# Patient Record
Sex: FEMALE | Race: WHITE | Employment: UNEMPLOYED | URBAN - METROPOLITAN AREA
[De-identification: names, ages, dates, MRNs, and addresses within clinical notes are randomized per-mention and may not be internally consistent; named-entity substitution may affect disease eponyms.]

---

## 2017-03-21 PROBLEM — F32.5 MAJOR DEPRESSIVE DISORDER WITH SINGLE EPISODE, IN REMISSION (HCC): Status: ACTIVE | Noted: 2017-03-21

## 2017-08-08 ENCOUNTER — HOSPITAL ENCOUNTER (OUTPATIENT)
Dept: OTHER | Age: 42
Discharge: HOME | End: 2017-08-08
Attending: INTERNAL MEDICINE | Admitting: INTERNAL MEDICINE

## 2017-08-08 DIAGNOSIS — I73.9 PVD (PERIPHERAL VASCULAR DISEASE) (HCC): ICD-10-CM

## 2017-08-08 DIAGNOSIS — F33.42 RECURRENT MAJOR DEPRESSIVE DISORDER, IN FULL REMISSION (HCC): ICD-10-CM

## 2017-08-08 LAB
A/G RATIO: 1.5 (ref 1.1–2.2)
ALBUMIN SERPL-MCNC: 4.3 G/DL (ref 3.4–5)
ALP BLD-CCNC: 72 U/L (ref 40–129)
ALT SERPL-CCNC: 10 U/L (ref 10–40)
ANION GAP SERPL CALCULATED.3IONS-SCNC: 15 MMOL/L (ref 3–16)
AST SERPL-CCNC: 13 U/L (ref 15–37)
BILIRUB SERPL-MCNC: 0.4 MG/DL (ref 0–1)
BUN BLDV-MCNC: 14 MG/DL (ref 7–20)
CALCIUM SERPL-MCNC: 9.9 MG/DL (ref 8.3–10.6)
CHLORIDE BLD-SCNC: 97 MMOL/L (ref 99–110)
CHOLESTEROL, TOTAL: 226 MG/DL (ref 0–199)
CO2: 25 MMOL/L (ref 21–32)
CREAT SERPL-MCNC: 0.9 MG/DL (ref 0.6–1.1)
GFR AFRICAN AMERICAN: >60
GFR NON-AFRICAN AMERICAN: >60
GLOBULIN: 2.9 G/DL
GLUCOSE BLD-MCNC: 82 MG/DL (ref 70–99)
HDLC SERPL-MCNC: 44 MG/DL (ref 40–60)
LDL CHOLESTEROL CALCULATED: 123 MG/DL
POTASSIUM SERPL-SCNC: 4.1 MMOL/L (ref 3.5–5.1)
SODIUM BLD-SCNC: 137 MMOL/L (ref 136–145)
TOTAL PROTEIN: 7.2 G/DL (ref 6.4–8.2)
TRIGL SERPL-MCNC: 296 MG/DL (ref 0–150)
VLDLC SERPL CALC-MCNC: 59 MG/DL

## 2018-07-19 ENCOUNTER — HOSPITAL ENCOUNTER (OUTPATIENT)
Dept: OTHER | Age: 43
Discharge: HOME | End: 2018-07-19
Attending: INTERNAL MEDICINE | Admitting: INTERNAL MEDICINE

## 2018-07-19 LAB
A/G RATIO: 1.7 (ref 1.1–2.2)
ALBUMIN SERPL-MCNC: 4.7 G/DL (ref 3.4–5)
ALP BLD-CCNC: 42 U/L (ref 40–129)
ALT SERPL-CCNC: 9 U/L (ref 10–40)
ANION GAP SERPL CALCULATED.3IONS-SCNC: 9 MMOL/L (ref 3–16)
AST SERPL-CCNC: 14 U/L (ref 15–37)
BILIRUB SERPL-MCNC: 0.4 MG/DL (ref 0–1)
BUN BLDV-MCNC: 12 MG/DL (ref 7–20)
CALCIUM SERPL-MCNC: 10.2 MG/DL (ref 8.3–10.6)
CHLORIDE BLD-SCNC: 104 MMOL/L (ref 99–110)
CHOLESTEROL, TOTAL: 154 MG/DL (ref 0–199)
CO2: 27 MMOL/L (ref 21–32)
CREAT SERPL-MCNC: 1 MG/DL (ref 0.6–1.1)
GFR AFRICAN AMERICAN: >60
GFR NON-AFRICAN AMERICAN: >60
GLOBULIN: 2.7 G/DL
GLUCOSE BLD-MCNC: 78 MG/DL (ref 70–99)
HCT VFR BLD CALC: 39.2 % (ref 36–48)
HDLC SERPL-MCNC: 57 MG/DL (ref 40–60)
HEMOGLOBIN: 13.5 G/DL (ref 12–16)
LDL CHOLESTEROL CALCULATED: 77 MG/DL
MCH RBC QN AUTO: 31.3 PG (ref 26–34)
MCHC RBC AUTO-ENTMCNC: 34.6 G/DL (ref 31–36)
MCV RBC AUTO: 90.5 FL (ref 80–100)
PDW BLD-RTO: 13.2 % (ref 12.4–15.4)
PLATELET # BLD: 330 K/UL (ref 135–450)
PMV BLD AUTO: 8.4 FL (ref 5–10.5)
POTASSIUM SERPL-SCNC: 4.5 MMOL/L (ref 3.5–5.1)
RBC # BLD: 4.33 M/UL (ref 4–5.2)
SODIUM BLD-SCNC: 140 MMOL/L (ref 136–145)
TOTAL PROTEIN: 7.4 G/DL (ref 6.4–8.2)
TRIGL SERPL-MCNC: 102 MG/DL (ref 0–150)
VLDLC SERPL CALC-MCNC: 20 MG/DL
WBC # BLD: 8.4 K/UL (ref 4–11)

## 2019-05-22 ENCOUNTER — HOSPITAL ENCOUNTER (OUTPATIENT)
Age: 44
Discharge: HOME | End: 2019-05-22
Payer: COMMERCIAL

## 2019-05-22 DIAGNOSIS — E78.00 PURE HYPERCHOLESTEROLEMIA: ICD-10-CM

## 2019-05-22 DIAGNOSIS — I10 ESSENTIAL HYPERTENSION: ICD-10-CM

## 2019-05-22 LAB
A/G RATIO: 1.5 (ref 1.1–2.2)
ALBUMIN SERPL-MCNC: 4.8 G/DL (ref 3.4–5)
ALP BLD-CCNC: 44 U/L (ref 40–129)
ALT SERPL-CCNC: 10 U/L (ref 10–40)
ANION GAP SERPL CALCULATED.3IONS-SCNC: 14 MMOL/L (ref 3–16)
AST SERPL-CCNC: 16 U/L (ref 15–37)
BILIRUB SERPL-MCNC: 0.5 MG/DL (ref 0–1)
BUN BLDV-MCNC: 16 MG/DL (ref 7–20)
CALCIUM SERPL-MCNC: 10.8 MG/DL (ref 8.3–10.6)
CHLORIDE BLD-SCNC: 104 MMOL/L (ref 99–110)
CHOLESTEROL, TOTAL: 177 MG/DL (ref 0–199)
CO2: 25 MMOL/L (ref 21–32)
CREAT SERPL-MCNC: 1.3 MG/DL (ref 0.6–1.1)
GFR AFRICAN AMERICAN: 54
GFR NON-AFRICAN AMERICAN: 44
GLOBULIN: 3.1 G/DL
GLUCOSE BLD-MCNC: 88 MG/DL (ref 70–99)
HCT VFR BLD CALC: 38.4 % (ref 36–48)
HDLC SERPL-MCNC: 49 MG/DL (ref 40–60)
HEMOGLOBIN: 13.4 G/DL (ref 12–16)
LDL CHOLESTEROL CALCULATED: 91 MG/DL
MCH RBC QN AUTO: 31.4 PG (ref 26–34)
MCHC RBC AUTO-ENTMCNC: 34.8 G/DL (ref 31–36)
MCV RBC AUTO: 90.4 FL (ref 80–100)
PDW BLD-RTO: 13.4 % (ref 12.4–15.4)
PLATELET # BLD: 313 K/UL (ref 135–450)
PMV BLD AUTO: 8.6 FL (ref 5–10.5)
POTASSIUM SERPL-SCNC: 3.7 MMOL/L (ref 3.5–5.1)
RBC # BLD: 4.25 M/UL (ref 4–5.2)
SODIUM BLD-SCNC: 143 MMOL/L (ref 136–145)
TOTAL PROTEIN: 7.9 G/DL (ref 6.4–8.2)
TRIGL SERPL-MCNC: 183 MG/DL (ref 0–150)
VLDLC SERPL CALC-MCNC: 37 MG/DL
WBC # BLD: 9.8 K/UL (ref 4–11)

## 2019-05-22 PROCEDURE — 80053 COMPREHEN METABOLIC PANEL: CPT

## 2019-05-22 PROCEDURE — 80061 LIPID PANEL: CPT

## 2019-05-22 PROCEDURE — 85027 COMPLETE CBC AUTOMATED: CPT

## 2019-05-22 PROCEDURE — 36415 COLL VENOUS BLD VENIPUNCTURE: CPT

## 2021-08-25 ENCOUNTER — HOSPITAL ENCOUNTER (OUTPATIENT)
Dept: GENERAL RADIOLOGY | Age: 46
Discharge: HOME | End: 2021-08-25
Payer: COMMERCIAL

## 2021-08-25 DIAGNOSIS — S99.922A INJURY OF TOE ON LEFT FOOT, INITIAL ENCOUNTER: ICD-10-CM

## 2021-08-25 PROCEDURE — 73660 X-RAY EXAM OF TOE(S): CPT

## 2021-08-29 LAB — URINE CULTURE, ROUTINE: NORMAL

## 2021-08-30 LAB
GRAM STAIN RESULT: ABNORMAL
ORGANISM: ABNORMAL
WOUND/ABSCESS: ABNORMAL

## 2021-10-14 ENCOUNTER — HOSPITAL ENCOUNTER (OUTPATIENT)
Dept: WOMENS IMAGING | Age: 46
Discharge: HOME | End: 2021-10-14
Payer: COMMERCIAL

## 2021-10-14 VITALS — WEIGHT: 165 LBS | HEIGHT: 69 IN | BODY MASS INDEX: 24.44 KG/M2

## 2021-10-14 DIAGNOSIS — Z12.31 VISIT FOR SCREENING MAMMOGRAM: ICD-10-CM

## 2021-10-14 PROCEDURE — 77067 SCR MAMMO BI INCL CAD: CPT

## 2021-10-15 ENCOUNTER — OFFICE VISIT (OUTPATIENT)
Dept: FAMILY MEDICINE CLINIC | Age: 46
End: 2021-10-15
Payer: COMMERCIAL

## 2021-10-15 VITALS
DIASTOLIC BLOOD PRESSURE: 76 MMHG | OXYGEN SATURATION: 98 % | HEIGHT: 69 IN | WEIGHT: 165.6 LBS | BODY MASS INDEX: 24.53 KG/M2 | SYSTOLIC BLOOD PRESSURE: 124 MMHG | HEART RATE: 75 BPM | RESPIRATION RATE: 18 BRPM

## 2021-10-15 DIAGNOSIS — N32.81 OAB (OVERACTIVE BLADDER): ICD-10-CM

## 2021-10-15 DIAGNOSIS — R92.8 ABNORMAL MAMMOGRAM OF BOTH BREASTS: ICD-10-CM

## 2021-10-15 DIAGNOSIS — E87.6 HYPOKALEMIA: ICD-10-CM

## 2021-10-15 DIAGNOSIS — K21.9 GASTROESOPHAGEAL REFLUX DISEASE WITHOUT ESOPHAGITIS: ICD-10-CM

## 2021-10-15 DIAGNOSIS — R92.8 ABNORMAL MAMMOGRAM: Primary | ICD-10-CM

## 2021-10-15 DIAGNOSIS — I10 ESSENTIAL HYPERTENSION: ICD-10-CM

## 2021-10-15 DIAGNOSIS — Z76.89 ENCOUNTER TO ESTABLISH CARE: Primary | ICD-10-CM

## 2021-10-15 DIAGNOSIS — E78.00 PURE HYPERCHOLESTEROLEMIA: ICD-10-CM

## 2021-10-15 PROCEDURE — 99204 OFFICE O/P NEW MOD 45 MIN: CPT | Performed by: NURSE PRACTITIONER

## 2021-10-15 PROCEDURE — 36415 COLL VENOUS BLD VENIPUNCTURE: CPT | Performed by: NURSE PRACTITIONER

## 2021-10-15 PROCEDURE — G8427 DOCREV CUR MEDS BY ELIG CLIN: HCPCS | Performed by: NURSE PRACTITIONER

## 2021-10-15 PROCEDURE — 1036F TOBACCO NON-USER: CPT | Performed by: NURSE PRACTITIONER

## 2021-10-15 PROCEDURE — G8484 FLU IMMUNIZE NO ADMIN: HCPCS | Performed by: NURSE PRACTITIONER

## 2021-10-15 PROCEDURE — G8420 CALC BMI NORM PARAMETERS: HCPCS | Performed by: NURSE PRACTITIONER

## 2021-10-15 RX ORDER — CLONIDINE HYDROCHLORIDE 0.1 MG/1
TABLET ORAL
COMMUNITY
Start: 2021-10-11 | End: 2022-03-09

## 2021-10-15 RX ORDER — AMLODIPINE BESYLATE 5 MG/1
5 TABLET ORAL DAILY
COMMUNITY
Start: 2021-07-01 | End: 2021-10-15 | Stop reason: SDUPTHER

## 2021-10-15 RX ORDER — PROMETHAZINE HYDROCHLORIDE 12.5 MG/1
TABLET ORAL
COMMUNITY
Start: 2021-09-01 | End: 2021-10-15 | Stop reason: SDUPTHER

## 2021-10-15 RX ORDER — FLUOXETINE HYDROCHLORIDE 20 MG/1
CAPSULE ORAL
COMMUNITY
Start: 2021-07-22 | End: 2021-10-15

## 2021-10-15 RX ORDER — POTASSIUM CHLORIDE 750 MG/1
10 TABLET, EXTENDED RELEASE ORAL DAILY
COMMUNITY
Start: 2021-08-03 | End: 2021-10-15 | Stop reason: SDUPTHER

## 2021-10-15 RX ORDER — LOSARTAN POTASSIUM 100 MG/1
100 TABLET ORAL DAILY
Qty: 90 TABLET | Refills: 1 | Status: SHIPPED | OUTPATIENT
Start: 2021-10-15 | End: 2022-03-09 | Stop reason: SDUPTHER

## 2021-10-15 RX ORDER — AMLODIPINE BESYLATE 5 MG/1
5 TABLET ORAL DAILY
Qty: 90 TABLET | Refills: 1 | Status: SHIPPED | OUTPATIENT
Start: 2021-10-15 | End: 2022-03-09 | Stop reason: SDUPTHER

## 2021-10-15 RX ORDER — ESOMEPRAZOLE MAGNESIUM 40 MG/1
40 CAPSULE, DELAYED RELEASE ORAL
Qty: 90 CAPSULE | Refills: 1 | Status: SHIPPED | OUTPATIENT
Start: 2021-10-15 | End: 2022-03-09 | Stop reason: SDUPTHER

## 2021-10-15 RX ORDER — ESOMEPRAZOLE MAGNESIUM 40 MG/1
40 CAPSULE, DELAYED RELEASE ORAL
COMMUNITY
Start: 2021-08-25 | End: 2021-10-15 | Stop reason: SDUPTHER

## 2021-10-15 RX ORDER — POTASSIUM CHLORIDE 750 MG/1
10 TABLET, EXTENDED RELEASE ORAL DAILY
Qty: 90 TABLET | Refills: 1 | Status: SHIPPED | OUTPATIENT
Start: 2021-10-15 | End: 2022-08-17 | Stop reason: SDUPTHER

## 2021-10-15 RX ORDER — LOSARTAN POTASSIUM 100 MG/1
TABLET ORAL
COMMUNITY
End: 2021-10-15 | Stop reason: SDUPTHER

## 2021-10-15 RX ORDER — RISPERIDONE 0.5 MG/1
TABLET, FILM COATED ORAL
COMMUNITY
Start: 2021-10-11 | End: 2022-04-25 | Stop reason: SDUPTHER

## 2021-10-15 RX ORDER — OXYBUTYNIN CHLORIDE 10 MG/1
10 TABLET, EXTENDED RELEASE ORAL DAILY
Qty: 30 TABLET | Refills: 3 | Status: SHIPPED | OUTPATIENT
Start: 2021-10-15 | End: 2021-10-15

## 2021-10-15 RX ORDER — PROMETHAZINE HYDROCHLORIDE 12.5 MG/1
TABLET ORAL
Qty: 30 TABLET | Refills: 2 | Status: SHIPPED | OUTPATIENT
Start: 2021-10-15 | End: 2022-03-09 | Stop reason: ALTCHOICE

## 2021-10-15 RX ORDER — OXYBUTYNIN CHLORIDE 10 MG/1
10 TABLET, EXTENDED RELEASE ORAL DAILY
Qty: 90 TABLET | Refills: 1 | Status: SHIPPED | OUTPATIENT
Start: 2021-10-15 | End: 2022-03-09 | Stop reason: SDUPTHER

## 2021-10-15 RX ORDER — ACYCLOVIR 200 MG/1
CAPSULE ORAL
COMMUNITY
End: 2021-11-09 | Stop reason: SDUPTHER

## 2021-10-15 SDOH — ECONOMIC STABILITY: TRANSPORTATION INSECURITY
IN THE PAST 12 MONTHS, HAS LACK OF TRANSPORTATION KEPT YOU FROM MEETINGS, WORK, OR FROM GETTING THINGS NEEDED FOR DAILY LIVING?: NO

## 2021-10-15 SDOH — ECONOMIC STABILITY: TRANSPORTATION INSECURITY
IN THE PAST 12 MONTHS, HAS THE LACK OF TRANSPORTATION KEPT YOU FROM MEDICAL APPOINTMENTS OR FROM GETTING MEDICATIONS?: NO

## 2021-10-15 SDOH — ECONOMIC STABILITY: FOOD INSECURITY: WITHIN THE PAST 12 MONTHS, THE FOOD YOU BOUGHT JUST DIDN'T LAST AND YOU DIDN'T HAVE MONEY TO GET MORE.: NEVER TRUE

## 2021-10-15 SDOH — ECONOMIC STABILITY: FOOD INSECURITY: WITHIN THE PAST 12 MONTHS, YOU WORRIED THAT YOUR FOOD WOULD RUN OUT BEFORE YOU GOT MONEY TO BUY MORE.: NEVER TRUE

## 2021-10-15 ASSESSMENT — ENCOUNTER SYMPTOMS
SINUS PAIN: 0
VOMITING: 0
EYE DISCHARGE: 0
DIARRHEA: 0
EYE REDNESS: 0
EYE PAIN: 0
SORE THROAT: 0
WHEEZING: 0
ABDOMINAL PAIN: 1
COUGH: 0
ABDOMINAL DISTENTION: 0
SINUS PRESSURE: 0
NAUSEA: 0
SHORTNESS OF BREATH: 0

## 2021-10-15 ASSESSMENT — SOCIAL DETERMINANTS OF HEALTH (SDOH): HOW HARD IS IT FOR YOU TO PAY FOR THE VERY BASICS LIKE FOOD, HOUSING, MEDICAL CARE, AND HEATING?: NOT HARD AT ALL

## 2021-10-15 NOTE — PATIENT INSTRUCTIONS
Patient Education        oxybutynin (oral)  Pronunciation:  CHRISTOPHER i NAPOLEON ti robin  Brand:  Ditropan XL  What is the most important information I should know about oxybutynin? You should not use oxybutynin if you have untreated or uncontrolled narrow-angle glaucoma, a blockage in your digestive tract (stomach or intestines), or if you are unable to urinate. What is oxybutynin? Oxybutynin reduces muscle spasms of the bladder and urinary tract. Oxybutynin is used to treat symptoms of overactive bladder, such as frequent or urgent urination, incontinence (urine leakage), and increased night-time urination. Oxybutynin may also be used for purposes not listed in this medication guide. What should I discuss with my healthcare provider before using oxybutynin? You should not use oxybutynin if you are allergic to it, or if you have:  · untreated or uncontrolled narrow-angle glaucoma;  · a blockage in your digestive tract (stomach or intestines); or  · if you are unable to urinate. Tell your doctor if you have ever had:  · glaucoma;  · liver disease;  · kidney disease;  · an enlarged prostate;  · ulcerative colitis;  · Parkinson's disease;  · a nerve disorder that affects your heart rate, blood pressure, or digestion;  · a muscle disorder such as myasthenia gravis; or  · a stomach disorder such as gastroesophageal reflux disease (GERD) or slow digestion. Tell your doctor if you are pregnant or breastfeeding. How should I take oxybutynin? Follow all directions on your prescription label and read all medication guides or instruction sheets. Use the medicine exactly as directed. Take oxybutynin with a full glass of water, at the same time each day. Oxybutynin may be taken with or without food. Swallow the extended-release tablet whole and do not crush, chew, or break it. Measure liquid medicine carefully. Use the dosing syringe provided, or use a medicine dose-measuring device (not a kitchen spoon).   Some tablets are worsen this effect. Ask your doctor before using opioid medication, a sleeping pill, a muscle relaxer, or medicine for anxiety or seizures. Tell your doctor about all your other medicines, especially:  · medicine to treat depression, anxiety, mood disorders, or mental illness;  · cold or allergy medicine (Benadryl and others);  · medicine to treat Parkinson's disease;  · medicine to treat stomach problems, motion sickness, or irritable bowel syndrome;  · medicine to treat overactive bladder; or  · bronchodilator asthma medication. This list is not complete. Other drugs may affect oxybutynin, including prescription and over-the-counter medicines, vitamins, and herbal products. Not all possible drug interactions are listed here. Where can I get more information? Your pharmacist can provide more information about oxybutynin. Remember, keep this and all other medicines out of the reach of children, never share your medicines with others, and use this medication only for the indication prescribed. Every effort has been made to ensure that the information provided by [address] is accurate, up-to-date, and complete, but no guarantee is made to that effect. Drug information contained herein may be time sensitive. [facility] information has been compiled for use by healthcare practitioners and consumers in the [facility] and therefore [facility] does not warrant that uses outside of the [facility] are appropriate, unless specifically indicated otherwise. [facility] drug information does not endorse drugs, diagnose patients or recommend therapy. [facility] drug information is an informational resource designed to assist licensed healthcare practitioners in caring for their patients and/or to serve consumers viewing this service as a supplement to, and not a substitute for, the expertise, skill, knowledge and judgment of healthcare practitioners.  The absence of a warning for a given drug or drug combination in no way should be construed to indicate that the drug or drug combination is safe, effective or appropriate for any given patient. [facility] does not assume any responsibility for any aspect of healthcare administered with the aid of information [facility] provides. The information contained herein is not intended to cover all possible uses, directions, precautions, warnings, drug interactions, allergic reactions, or adverse effects. If you have questions about the drugs you are taking, check with your doctor, nurse or pharmacist.  Copyright 0161-7015 [address]: 7.01. Revision date: 9/17/2019. Care instructions adapted under license by [facility] ([facility]). If you have questions about a medical condition or this instruction, always ask your healthcare professional. [place] [address] any warranty or liability for your use of this information.

## 2021-10-15 NOTE — PROGRESS NOTES
Grace Mock (:  1975) is a 54 y.o. female,Established patient, here for evaluation of the following chief complaint(s):  New Patient (NEW PATIENT, EST CARE, NEEDS REFILLS ON MEDICATION )      ASSESSMENT/PLAN:  1. Encounter to establish care  -The patient's medical, surgical, social, and family history were reviewed with the patient. Medications were reconciled. 2. Hypokalemia  -Reassess CMP today. Potassium chloride is being refilled. Will adjust or discontinue based upon results.  -Continue to follow with GI for evaluation. If this evaluation is unremarkable, consider referral to nephrology  -     Comprehensive Metabolic Panel; Future  -     potassium chloride (KLOR-CON M) 10 MEQ extended release tablet; Take 1 tablet by mouth daily, Disp-90 tablet, R-1Normal  3. OAB (overactive bladder)  -Presentation is consistent with overactive bladder. Discussed options with the patient. Oxybutynin is being sent to the pharmacy. The patient was educated on the medication use as well as side effects. Follow-up as needed if no improvement. -     oxybutynin (DITROPAN XL) 10 MG extended release tablet; Take 1 tablet by mouth daily, Disp-90 tablet, R-1Normal  4. Essential hypertension  -Controlled on losartan and amlodipine. Refill today. Check CMP today. -     losartan (COZAAR) 100 MG tablet; Take 1 tablet by mouth daily, Disp-90 tablet, R-1Normal  -     amLODIPine (NORVASC) 5 MG tablet; Take 1 tablet by mouth daily, Disp-90 tablet, R-1Normal  5. Gastroesophageal reflux disease without esophagitis  -Controlled on Nexium and promethazine as needed. Patient is following up with GI at [facility]. Defer any adjustment to them. -     esomeprazole (NEXIUM) 40 MG delayed release capsule; Take 1 capsule by mouth every morning (before breakfast), Disp-90 capsule, R-1Normal  -     promethazine (PHENERGAN) 12.5 MG tablet; TAKE ONE TABLET BY MOUTH EVERY 6 HOURS AS NEEDED FOR NAUSEA, Disp-30 tablet, R-2Normal  6. Pure hypercholesterolemia  -Has been elevated in the past.  Not checked in the few years. Recommended fasting labs. Will check at next follow-up in 6 months. 7. Abnormal mammogram of both breasts  -Prior to the patient's appointment, mammogram results have been sent to me. Noted asymmetry to both breasts. Had recommended diagnostic mammogram with possible ultrasound. Orders had already been placed. I discussed these results with the patient. She will call the scheduling service to schedule these tests. Return in about 6 months (around 4/15/2022) for Follow-up hypertension/fasting labs. SUBJECTIVE/OBJECTIVE:  EAMON Billings presents today to establish care. Her main concern is regarding medication refills as well as her potassium. She has a history of severely low potassium that need to be treated in the hospital.  She has been taking a potassium supplement for this since. She states that she ran out of the potassium supplement approximately 3 weeks ago. She had an issue with her previous PCP which is leading her to change providers. She has had multiple test performed for this. She is currently seeing a specialist with GI at [facility] to further investigate a possible cause for the hypokalemia. She has an EGD and colonoscopy scheduled for next week. She states that she has a history of a total hysterectomy. They found the ovarian mass. Following hysterectomy, she had issues with fluid in her abdomen which caused sepsis. She also had an issue with some bowel gain in the wrong place. He is required surgical procedures, and she is concerned that this may be the cause of her low potassium. She has also noted that since all of this happened she goes to the bathroom very frequently overnight. We will go 7-8 times. She would like to have her potassium checked to ensure that is not a problem. She would also like all of her medications refilled.     Nestor Billings has a significant mental health history including anxiety and depression. She is currently seeing psych for this. They prescribe her psych medications. They have been trying multiple combinations for her. They had tried Prozac, and this made her \"crazy. \"She is currently doing Risperdal and clonidine. She very recently started this regimen, so effectiveness is not quite yet been assessed. She does state that since stopping the Prozac she feels a lot better. Otherwise, Jeralyn Simmonds is a history of high blood pressure, high cholesterol that has not required medication, tobacco use, and marijuana use. She has stopped smoking cigarettes and marijuana. Review of Systems   Constitutional: Negative for chills and fever. HENT: Negative for ear discharge, ear pain, hearing loss, sinus pressure, sinus pain and sore throat. Eyes: Negative for pain, discharge and redness. Respiratory: Negative for cough, shortness of breath and wheezing. Cardiovascular: Positive for leg swelling. Negative for chest pain and palpitations. Gastrointestinal: Positive for abdominal pain. Negative for abdominal distention, diarrhea, nausea and vomiting. Genitourinary: Negative for dysuria and hematuria. Musculoskeletal: Negative for myalgias. Skin: Negative for rash. Neurological: Negative for dizziness, weakness, light-headedness, numbness and headaches. Psychiatric/Behavioral: Negative for decreased concentration, dysphoric mood, self-injury, sleep disturbance and suicidal ideas. The patient is nervous/anxious. Physical Exam  Constitutional:       General: She is not in acute distress. Appearance: Normal appearance. She is normal weight. HENT:      Head: Normocephalic and atraumatic. Right Ear: Tympanic membrane, ear canal and external ear normal. There is no impacted cerumen. Left Ear: Tympanic membrane, ear canal and external ear normal. There is no impacted cerumen.       Mouth/Throat:      Mouth: Mucous membranes are moist. Eyes:      Extraocular Movements: Extraocular movements intact. Conjunctiva/sclera: Conjunctivae normal.      Pupils: Pupils are equal, round, and reactive to light. Neck:      Thyroid: No thyromegaly. Vascular: No carotid bruit. Cardiovascular:      Rate and Rhythm: Normal rate and regular rhythm. Pulses: Normal pulses. Heart sounds: Normal heart sounds. No murmur heard. No gallop. Pulmonary:      Effort: Pulmonary effort is normal.      Breath sounds: Normal breath sounds. No wheezing. Abdominal:      General: Bowel sounds are normal.      Palpations: Abdomen is soft. There is no mass. Tenderness: There is abdominal tenderness. There is no guarding or rebound. Comments: Right lower quadrant tenderness   Musculoskeletal:         General: Normal range of motion. Cervical back: Normal range of motion and neck supple. Lymphadenopathy:      Cervical: No cervical adenopathy. Skin:     General: Skin is warm and dry. Capillary Refill: Capillary refill takes less than 2 seconds. Neurological:      Mental Status: She is alert and oriented to person, place, and time. Psychiatric:         Mood and Affect: Mood normal.         Behavior: Behavior normal.         Thought Content: Thought content normal.         Judgment: Judgment normal.               This dictation was generated by voice recognition computer software. Although all attempts are made to edit the dictation for accuracy, there may be errors in the transcription that are not intended. An electronic signature was used to authenticate this note.     --CATRACHO Macias - CNP

## 2021-10-16 LAB
A/G RATIO: 2 (ref 1.1–2.2)
ALBUMIN SERPL-MCNC: 4.8 G/DL (ref 3.4–5)
ALP BLD-CCNC: 56 U/L (ref 40–129)
ALT SERPL-CCNC: 27 U/L (ref 10–40)
ANION GAP SERPL CALCULATED.3IONS-SCNC: 17 MMOL/L (ref 3–16)
AST SERPL-CCNC: 28 U/L (ref 15–37)
BILIRUB SERPL-MCNC: <0.2 MG/DL (ref 0–1)
BUN BLDV-MCNC: 16 MG/DL (ref 7–20)
CALCIUM SERPL-MCNC: 10.1 MG/DL (ref 8.3–10.6)
CHLORIDE BLD-SCNC: 103 MMOL/L (ref 99–110)
CO2: 21 MMOL/L (ref 21–32)
CREAT SERPL-MCNC: 1.1 MG/DL (ref 0.6–1.1)
GFR AFRICAN AMERICAN: >60
GFR NON-AFRICAN AMERICAN: 53
GLOBULIN: 2.4 G/DL
GLUCOSE BLD-MCNC: 65 MG/DL (ref 70–99)
POTASSIUM SERPL-SCNC: 4 MMOL/L (ref 3.5–5.1)
SODIUM BLD-SCNC: 141 MMOL/L (ref 136–145)
TOTAL PROTEIN: 7.2 G/DL (ref 6.4–8.2)

## 2021-10-25 ENCOUNTER — TELEPHONE (OUTPATIENT)
Dept: RHEUMATOLOGY | Age: 46
End: 2021-10-25

## 2021-11-09 ENCOUNTER — HOSPITAL ENCOUNTER (OUTPATIENT)
Dept: WOMENS IMAGING | Age: 46
Discharge: HOME | End: 2021-11-09
Payer: COMMERCIAL

## 2021-11-09 ENCOUNTER — TELEPHONE (OUTPATIENT)
Dept: FAMILY MEDICINE CLINIC | Age: 46
End: 2021-11-09

## 2021-11-09 ENCOUNTER — HOSPITAL ENCOUNTER (OUTPATIENT)
Dept: ULTRASOUND IMAGING | Age: 46
Discharge: HOME | End: 2021-11-09
Payer: COMMERCIAL

## 2021-11-09 DIAGNOSIS — R92.8 ABNORMAL MAMMOGRAM: ICD-10-CM

## 2021-11-09 DIAGNOSIS — B00.1 FEVER BLISTER: Primary | ICD-10-CM

## 2021-11-09 PROCEDURE — 76641 ULTRASOUND BREAST COMPLETE: CPT

## 2021-11-09 PROCEDURE — G0279 TOMOSYNTHESIS, MAMMO: HCPCS

## 2021-11-09 RX ORDER — ACYCLOVIR 200 MG/1
400 CAPSULE ORAL 3 TIMES DAILY
Qty: 30 CAPSULE | Refills: 1 | Status: SHIPPED | OUTPATIENT
Start: 2021-11-09 | End: 2021-11-14

## 2021-11-09 NOTE — TELEPHONE ENCOUNTER
Patient stated she woke up with another fever blister and needs a script for ayclovir       [id number] [address] [place] [address]

## 2022-01-10 ENCOUNTER — VIRTUAL VISIT (OUTPATIENT)
Dept: FAMILY MEDICINE CLINIC | Age: 47
End: 2022-01-10
Payer: COMMERCIAL

## 2022-01-10 DIAGNOSIS — R41.3 MEMORY LOSS: ICD-10-CM

## 2022-01-10 DIAGNOSIS — R51.9 NONINTRACTABLE HEADACHE, UNSPECIFIED CHRONICITY PATTERN, UNSPECIFIED HEADACHE TYPE: ICD-10-CM

## 2022-01-10 DIAGNOSIS — U07.1 COVID-19: Primary | ICD-10-CM

## 2022-01-10 PROCEDURE — 1036F TOBACCO NON-USER: CPT | Performed by: NURSE PRACTITIONER

## 2022-01-10 PROCEDURE — 99213 OFFICE O/P EST LOW 20 MIN: CPT | Performed by: NURSE PRACTITIONER

## 2022-01-10 PROCEDURE — G8484 FLU IMMUNIZE NO ADMIN: HCPCS | Performed by: NURSE PRACTITIONER

## 2022-01-10 PROCEDURE — G8420 CALC BMI NORM PARAMETERS: HCPCS | Performed by: NURSE PRACTITIONER

## 2022-01-10 PROCEDURE — G8427 DOCREV CUR MEDS BY ELIG CLIN: HCPCS | Performed by: NURSE PRACTITIONER

## 2022-01-10 RX ORDER — IBUPROFEN 800 MG/1
800 TABLET ORAL
Qty: 90 TABLET | Refills: 0 | Status: SHIPPED | OUTPATIENT
Start: 2022-01-10 | End: 2022-02-03

## 2022-01-10 ASSESSMENT — PATIENT HEALTH QUESTIONNAIRE - PHQ9: DEPRESSION UNABLE TO ASSESS: URGENT/EMERGENT SITUATION

## 2022-01-10 ASSESSMENT — ENCOUNTER SYMPTOMS
SHORTNESS OF BREATH: 0
WHEEZING: 0

## 2022-01-10 NOTE — PROGRESS NOTES
improve. SUBJECTIVE/OBJECTIVE:  EAMON  Kem Rao presents today via telephone with concerns of symptoms following COVID-19. She states that she developed symptoms of COVID-19 on 12/7 and was diagnosed on 12/11. She states that her congestion and respiratory symptoms have resolved, but she is still experiencing brain fog, forgetfulness, difficulty getting out words at times, and headaches. She states that the headaches have been constant in nature. Memory speaking issues come and go. When she gets especially frustrated, she states that her mouth and chin will sometimes go numb. She has not been checking her blood pressure at home. Denies any weakness or numbness to the extremities. Denies any visual disturbance. She states that this has made her severely stressed out. She talked to her therapist about it, and they discussed talking with PCP about referral to neurology. When she gets the headache she has tried 600 mg ibuprofen without alleviation of symptoms. Review of Systems   Constitutional: Negative for chills and fever. Respiratory: Negative for shortness of breath and wheezing. Cardiovascular: Negative for chest pain and palpitations. Neurological: Positive for headaches. Negative for weakness, light-headedness and numbness. Forgetful. Some difficulties with words at times. Psychiatric/Behavioral: Positive for decreased concentration and sleep disturbance. Negative for dysphoric mood, self-injury and suicidal ideas. The patient is nervous/anxious. No flowsheet data found. Physical Exam  Constitutional:       Comments: Limited due to telephone call   Neurological:      Mental Status: She is alert and oriented to person, place, and time. Psychiatric:         Mood and Affect: Mood normal.         Thought Content:  Thought content normal.         Judgment: Judgment normal.      Comments: Tearful during visit             On this date 1/10/2022 I have spent 30 minutes reviewing previous notes, test results and face to face (virtual) with the patient discussing the diagnosis and importance of compliance with the treatment plan as well as documenting on the day of the visit. Reuben Malhotra, was evaluated through a synchronous (real-time) audio-video encounter. The patient (or guardian if applicable) is aware that this is a billable service. Verbal consent to proceed has been obtained within the past 12 months. The visit was conducted pursuant to the emergency declaration under the [address] [place] [address], [address] [place] [address] and the [employer] Act. Patient identification was verified, and a caregiver was present when appropriate. The patient was located in a state where the provider was credentialed to provide care. This dictation was generated by voice recognition computer software. Although all attempts are made to edit the dictation for accuracy, there may be errors in the transcription that are not intended. An electronic signature was used to authenticate this note.     --Elli Nascimento, CATRACHO - CNP

## 2022-01-18 ENCOUNTER — HOSPITAL ENCOUNTER (OUTPATIENT)
Dept: MRI IMAGING | Age: 47
Discharge: HOME | End: 2022-01-18
Payer: COMMERCIAL

## 2022-01-18 DIAGNOSIS — R41.3 MEMORY LOSS: ICD-10-CM

## 2022-01-18 DIAGNOSIS — R51.9 NONINTRACTABLE HEADACHE, UNSPECIFIED CHRONICITY PATTERN, UNSPECIFIED HEADACHE TYPE: ICD-10-CM

## 2022-01-18 DIAGNOSIS — U07.1 COVID-19: ICD-10-CM

## 2022-01-18 PROCEDURE — 70551 MRI BRAIN STEM W/O DYE: CPT

## 2022-01-19 DIAGNOSIS — R51.9 NONINTRACTABLE HEADACHE, UNSPECIFIED CHRONICITY PATTERN, UNSPECIFIED HEADACHE TYPE: Primary | ICD-10-CM

## 2022-01-19 RX ORDER — SUMATRIPTAN 100 MG/1
100 TABLET, FILM COATED ORAL
Qty: 9 TABLET | Refills: 2 | Status: SHIPPED | OUTPATIENT
Start: 2022-01-19 | End: 2022-03-09 | Stop reason: SDUPTHER

## 2022-02-03 DIAGNOSIS — R51.9 NONINTRACTABLE HEADACHE, UNSPECIFIED CHRONICITY PATTERN, UNSPECIFIED HEADACHE TYPE: ICD-10-CM

## 2022-02-03 RX ORDER — IBUPROFEN 800 MG/1
TABLET ORAL
Qty: 90 TABLET | Refills: 0 | Status: SHIPPED | OUTPATIENT
Start: 2022-02-03 | End: 2022-03-09 | Stop reason: ALTCHOICE

## 2022-03-03 DIAGNOSIS — R51.9 NONINTRACTABLE HEADACHE, UNSPECIFIED CHRONICITY PATTERN, UNSPECIFIED HEADACHE TYPE: ICD-10-CM

## 2022-03-03 RX ORDER — IBUPROFEN 800 MG/1
TABLET ORAL
Qty: 90 TABLET | Refills: 0 | OUTPATIENT
Start: 2022-03-03

## 2022-03-09 ENCOUNTER — OFFICE VISIT (OUTPATIENT)
Dept: FAMILY MEDICINE CLINIC | Age: 47
End: 2022-03-09
Payer: COMMERCIAL

## 2022-03-09 VITALS
OXYGEN SATURATION: 98 % | SYSTOLIC BLOOD PRESSURE: 126 MMHG | HEART RATE: 82 BPM | DIASTOLIC BLOOD PRESSURE: 82 MMHG | WEIGHT: 165 LBS | HEIGHT: 69 IN | BODY MASS INDEX: 24.44 KG/M2 | TEMPERATURE: 97.6 F

## 2022-03-09 DIAGNOSIS — K21.9 GASTROESOPHAGEAL REFLUX DISEASE WITHOUT ESOPHAGITIS: ICD-10-CM

## 2022-03-09 DIAGNOSIS — G43.719 INTRACTABLE CHRONIC MIGRAINE WITHOUT AURA AND WITHOUT STATUS MIGRAINOSUS: ICD-10-CM

## 2022-03-09 DIAGNOSIS — Z13.31 POSITIVE DEPRESSION SCREENING: ICD-10-CM

## 2022-03-09 DIAGNOSIS — J30.2 SEASONAL ALLERGIC RHINITIS, UNSPECIFIED TRIGGER: ICD-10-CM

## 2022-03-09 DIAGNOSIS — F32.0 MILD SINGLE CURRENT EPISODE OF MAJOR DEPRESSIVE DISORDER (HCC): ICD-10-CM

## 2022-03-09 DIAGNOSIS — I10 ESSENTIAL HYPERTENSION: ICD-10-CM

## 2022-03-09 DIAGNOSIS — F32.5 MAJOR DEPRESSIVE DISORDER WITH SINGLE EPISODE, IN REMISSION (HCC): ICD-10-CM

## 2022-03-09 DIAGNOSIS — N32.81 OAB (OVERACTIVE BLADDER): Primary | ICD-10-CM

## 2022-03-09 PROCEDURE — G8427 DOCREV CUR MEDS BY ELIG CLIN: HCPCS | Performed by: NURSE PRACTITIONER

## 2022-03-09 PROCEDURE — G8420 CALC BMI NORM PARAMETERS: HCPCS | Performed by: NURSE PRACTITIONER

## 2022-03-09 PROCEDURE — G8484 FLU IMMUNIZE NO ADMIN: HCPCS | Performed by: NURSE PRACTITIONER

## 2022-03-09 PROCEDURE — 99214 OFFICE O/P EST MOD 30 MIN: CPT | Performed by: NURSE PRACTITIONER

## 2022-03-09 PROCEDURE — 1036F TOBACCO NON-USER: CPT | Performed by: NURSE PRACTITIONER

## 2022-03-09 RX ORDER — ESOMEPRAZOLE MAGNESIUM 40 MG/1
40 CAPSULE, DELAYED RELEASE ORAL
Qty: 90 CAPSULE | Refills: 1 | Status: SHIPPED | OUTPATIENT
Start: 2022-03-09 | End: 2022-08-17 | Stop reason: SDUPTHER

## 2022-03-09 RX ORDER — OXYBUTYNIN CHLORIDE 10 MG/1
10 TABLET, EXTENDED RELEASE ORAL DAILY
Qty: 90 TABLET | Refills: 1 | Status: SHIPPED | OUTPATIENT
Start: 2022-03-09 | End: 2022-11-01 | Stop reason: SDUPTHER

## 2022-03-09 RX ORDER — CETIRIZINE HYDROCHLORIDE 10 MG/1
10 TABLET ORAL DAILY
Qty: 90 TABLET | Refills: 1 | Status: SHIPPED | OUTPATIENT
Start: 2022-03-09 | End: 2022-08-26

## 2022-03-09 RX ORDER — SUMATRIPTAN 100 MG/1
100 TABLET, FILM COATED ORAL
Qty: 9 TABLET | Refills: 2 | Status: SHIPPED | OUTPATIENT
Start: 2022-03-09 | End: 2022-04-25

## 2022-03-09 RX ORDER — LOSARTAN POTASSIUM 100 MG/1
100 TABLET ORAL DAILY
Qty: 90 TABLET | Refills: 1 | Status: SHIPPED | OUTPATIENT
Start: 2022-03-09 | End: 2022-08-17 | Stop reason: SDUPTHER

## 2022-03-09 RX ORDER — AMLODIPINE BESYLATE 5 MG/1
5 TABLET ORAL DAILY
Qty: 90 TABLET | Refills: 1 | Status: SHIPPED | OUTPATIENT
Start: 2022-03-09 | End: 2022-08-18 | Stop reason: SDUPTHER

## 2022-03-09 RX ORDER — ESTRADIOL 1 MG/1
TABLET ORAL
COMMUNITY
Start: 2022-01-13

## 2022-03-09 RX ORDER — DESVENLAFAXINE 25 MG/1
TABLET, EXTENDED RELEASE ORAL
COMMUNITY
Start: 2022-02-24 | End: 2022-04-25 | Stop reason: SDUPTHER

## 2022-03-09 RX ORDER — HYDROCODONE BITARTRATE AND ACETAMINOPHEN 5; 325 MG/1; MG/1
TABLET ORAL
COMMUNITY
Start: 2022-03-01

## 2022-03-09 ASSESSMENT — PATIENT HEALTH QUESTIONNAIRE - PHQ9
7. TROUBLE CONCENTRATING ON THINGS, SUCH AS READING THE NEWSPAPER OR WATCHING TELEVISION: 3
5. POOR APPETITE OR OVEREATING: 3
3. TROUBLE FALLING OR STAYING ASLEEP: 3
SUM OF ALL RESPONSES TO PHQ QUESTIONS 1-9: 21
1. LITTLE INTEREST OR PLEASURE IN DOING THINGS: 3
9. THOUGHTS THAT YOU WOULD BE BETTER OFF DEAD, OR OF HURTING YOURSELF: 0
6. FEELING BAD ABOUT YOURSELF - OR THAT YOU ARE A FAILURE OR HAVE LET YOURSELF OR YOUR FAMILY DOWN: 3
10. IF YOU CHECKED OFF ANY PROBLEMS, HOW DIFFICULT HAVE THESE PROBLEMS MADE IT FOR YOU TO DO YOUR WORK, TAKE CARE OF THINGS AT HOME, OR GET ALONG WITH OTHER PEOPLE: 2
8. MOVING OR SPEAKING SO SLOWLY THAT OTHER PEOPLE COULD HAVE NOTICED. OR THE OPPOSITE, BEING SO FIGETY OR RESTLESS THAT YOU HAVE BEEN MOVING AROUND A LOT MORE THAN USUAL: 0
2. FEELING DOWN, DEPRESSED OR HOPELESS: 3
4. FEELING TIRED OR HAVING LITTLE ENERGY: 3
SUM OF ALL RESPONSES TO PHQ9 QUESTIONS 1 & 2: 6
SUM OF ALL RESPONSES TO PHQ QUESTIONS 1-9: 21

## 2022-03-09 ASSESSMENT — ENCOUNTER SYMPTOMS
COUGH: 0
WHEEZING: 0
PHOTOPHOBIA: 0
DIARRHEA: 0
ABDOMINAL PAIN: 0
NAUSEA: 0
SINUS PRESSURE: 0
EYE PAIN: 1
EYE DISCHARGE: 0
ABDOMINAL DISTENTION: 0
SINUS PAIN: 0
SORE THROAT: 0
EYE REDNESS: 0
SHORTNESS OF BREATH: 0
VOMITING: 0

## 2022-03-09 ASSESSMENT — COLUMBIA-SUICIDE SEVERITY RATING SCALE - C-SSRS
1. WITHIN THE PAST MONTH, HAVE YOU WISHED YOU WERE DEAD OR WISHED YOU COULD GO TO SLEEP AND NOT WAKE UP?: NO
2. HAVE YOU ACTUALLY HAD ANY THOUGHTS OF KILLING YOURSELF?: NO
6. HAVE YOU EVER DONE ANYTHING, STARTED TO DO ANYTHING, OR PREPARED TO DO ANYTHING TO END YOUR LIFE?: NO

## 2022-03-09 NOTE — PROGRESS NOTES
Kirstin Samuel (:  1975) is a 51 y.o. female,Established patient, here for evaluation of the following chief complaint(s):  Hypertension (ROUTINE HTN, STILL HAVING POST COVID SYMPTOMS )      ASSESSMENT/PLAN:  1. OAB (overactive bladder)  -Controlled on current dose of oxybutynin. No changes today. Refill  -     oxybutynin (DITROPAN XL) 10 MG extended release tablet; Take 1 tablet by mouth daily, Disp-90 tablet, R-1Normal  2. Essential hypertension  -Controlled on current dose of losartan and amlodipine. No changes today. Due for refill.  -     losartan (COZAAR) 100 MG tablet; Take 1 tablet by mouth daily, Disp-90 tablet, R-1Normal  -     amLODIPine (NORVASC) 5 MG tablet; Take 1 tablet by mouth daily, Disp-90 tablet, R-1Normal  3. Gastroesophageal reflux disease without esophagitis  -Controlled on current dose of Nexium. No changes today. Due for refill.  -     esomeprazole (NEXIUM) 40 MG delayed release capsule; Take 1 capsule by mouth every morning (before breakfast), Disp-90 capsule, R-1Normal  4. Intractable chronic migraine without aura and without status migrainosus  -Patient has had 1 migraine since sumatriptan was prescribed. Was able to abort with x2 tablets. Continue as ordered. Refill.  -     SUMAtriptan (IMITREX) 100 MG tablet; Take 1 tablet by mouth once as needed for Migraine, Disp-9 tablet, R-2Normal  5. Seasonal allergic rhinitis, unspecified trigger  -Educated the patient that the burning of the eyes along with the popping of the ears is likely related to uncontrolled seasonal allergies. Sending cetirizine to the pharmacy. Educated on medication use as well as side effects. Follow-up if no improvement. May need to add Flonase. -     cetirizine (ZYRTEC) 10 MG tablet; Take 1 tablet by mouth daily, Disp-90 tablet, R-1Normal  6.  Positive depression screening  -On the basis of positive PHQ-9 screening (PHQ-9 Total Score: 21), the following plan was implemented: Patient already follows up with psychiatry. Medications are being adjusted. .  Patient will follow-up as recommended by psychiatry  7. Mild single current episode of major depressive disorder ([facility] [address]  -Continues to follow psych. Patient's medications were recently changed in the past month or 2. Continue follow-up as recommended by them. 8. Major depressive disorder with single episode, in remission ([address]  -Continues to follow psych. Patient's medications were recently changed in the past month or 2. Continue follow-up as recommended by them. Return in about 6 months (around 9/9/2022) for Annual Physical/Fasting labs. SUBJECTIVE/OBJECTIVE:  EAMON  Forest Ojeda presents today for chronic condition follow-up. She states that the past few months of been a struggle. Since marcia COVID-19, she has experienced some brain fog, pressure in her ears, and burning eyes. She states that the brain fog has slowly improved with time. MRI showed consistent results with migraine but otherwise was negative. She had been prescribed sumatriptan for the migraines. She states she is was taking it for 1 migraine, and was able to abort it with 2 tablets. She continues to take all medications as ordered. She does not check her blood pressure at home. She continues to take oxybutynin for overactive bladder. States that this is worked well to manage her symptoms. Continues to take Nexium for GERD. States this also works well to control her symptoms. Forest Ojeda has been struggling with her mood fairly significantly. She states that she has felt depressed. She is following up with psychiatry. They have been trying to adjust her medications. She states that the transition to new medications at new doses has been rough. She currently is on Pristiq and risperidone. She has been on these for approximately 2 months. She is hopeful that this will slowly help with her mood. She also started seeing pain management for her neck.   Plan for injection. She recently broke her toe and is in a walking boot. Unsure whether there is a plan for surgery or not. Will be following up with Ortho in the upcoming weeks. Review of Systems   Constitutional: Negative for chills and fever. HENT: Positive for ear pain. Negative for ear discharge, hearing loss, sinus pressure, sinus pain and sore throat. Eyes: Positive for pain. Negative for photophobia, discharge, redness and visual disturbance. Respiratory: Negative for cough, shortness of breath and wheezing. Cardiovascular: Negative for chest pain and palpitations. Gastrointestinal: Negative for abdominal distention, abdominal pain, diarrhea, nausea and vomiting. Genitourinary: Negative for dysuria and hematuria. Musculoskeletal: Positive for arthralgias and neck pain. Negative for myalgias. Skin: Negative for rash. Neurological: Negative for dizziness, weakness, light-headedness, numbness and headaches. Psychiatric/Behavioral: Positive for decreased concentration, dysphoric mood and sleep disturbance. Negative for self-injury and suicidal ideas. The patient is nervous/anxious. Physical Exam  Constitutional:       General: She is not in acute distress. Appearance: Normal appearance. She is normal weight. HENT:      Head: Normocephalic and atraumatic. Right Ear: Tympanic membrane, ear canal and external ear normal.      Left Ear: Tympanic membrane, ear canal and external ear normal.      Mouth/Throat:      Mouth: Mucous membranes are moist.   Eyes:      Extraocular Movements: Extraocular movements intact. Conjunctiva/sclera: Conjunctivae normal.      Pupils: Pupils are equal, round, and reactive to light. Neck:      Thyroid: No thyromegaly. Vascular: No carotid bruit. Cardiovascular:      Rate and Rhythm: Normal rate and regular rhythm. Pulses: Normal pulses. Heart sounds: Normal heart sounds. No murmur heard. No gallop.     Pulmonary:      Effort: Pulmonary effort is normal.      Breath sounds: Normal breath sounds. No wheezing. Abdominal:      General: Bowel sounds are normal.      Palpations: Abdomen is soft. Tenderness: There is no abdominal tenderness. There is no guarding or rebound. Musculoskeletal:         General: Normal range of motion. Cervical back: Normal range of motion and neck supple. Comments: Left walking boot   Lymphadenopathy:      Cervical: No cervical adenopathy. Skin:     General: Skin is warm and dry. Capillary Refill: Capillary refill takes less than 2 seconds. Neurological:      Mental Status: She is alert and oriented to person, place, and time. Psychiatric:         Mood and Affect: Mood normal.         Behavior: Behavior normal.         Thought Content: Thought content normal.         Judgment: Judgment normal.      Comments: Tearful               This dictation was generated by voice recognition computer software. Although all attempts are made to edit the dictation for accuracy, there may be errors in the transcription that are not intended. An electronic signature was used to authenticate this note. --Elli Parkinson, APRN - CNP   PHQ-9 score today: (PHQ-9 Total Score: 21), additional evaluation and assessment performed, follow-up plan includes but not limited to: Medication management and Referral to /Specialist  for evaluation and management.

## 2022-04-07 RX ORDER — POTASSIUM CHLORIDE 750 MG/1
TABLET, FILM COATED, EXTENDED RELEASE ORAL
Qty: 90 TABLET | Refills: 1 | Status: SHIPPED | OUTPATIENT
Start: 2022-04-07

## 2022-04-21 ENCOUNTER — TELEPHONE (OUTPATIENT)
Dept: FAMILY MEDICINE CLINIC | Age: 47
End: 2022-04-21

## 2022-04-21 NOTE — TELEPHONE ENCOUNTER
PT HAS MISSED 3 APPTS WITH THEM THEY WILL NOT SEE HER AGAIN AS FAR AS SHE KNOWS. SHE WANTS TO SCHED AN APPT WITH [facility] ON Monday MORNING SHE HAS ENOUGH MEDICATION FOR THE WEEKEND. Bryan Thrasher

## 2022-04-21 NOTE — TELEPHONE ENCOUNTER
Patient called and said she missed her appt with her behavior mary dr. Rin Carrillo at [facility]       She states she needs refills on her medications     pristiq 25mg  Risperidone 0.5mg       she states she is still having problems and wants to know if [place] and up her dosage on the pristiq or whichever one he prefers       [id number] [address] [place] [address]

## 2022-04-21 NOTE — TELEPHONE ENCOUNTER
Did they dismiss her? If not, she needs to contact about rescheduling and refilling. If she already sees behavioral health, I am not going to adjust dose on her medications. I am fine with refilling the same doses to get her to the rescheduled appointment.

## 2022-04-25 ENCOUNTER — OFFICE VISIT (OUTPATIENT)
Dept: FAMILY MEDICINE CLINIC | Age: 47
End: 2022-04-25
Payer: COMMERCIAL

## 2022-04-25 VITALS
HEART RATE: 94 BPM | HEIGHT: 69 IN | BODY MASS INDEX: 30.81 KG/M2 | DIASTOLIC BLOOD PRESSURE: 88 MMHG | OXYGEN SATURATION: 98 % | WEIGHT: 208 LBS | SYSTOLIC BLOOD PRESSURE: 134 MMHG

## 2022-04-25 DIAGNOSIS — R52 GENERALIZED BODY ACHES: ICD-10-CM

## 2022-04-25 DIAGNOSIS — R63.5 ABNORMAL WEIGHT GAIN: ICD-10-CM

## 2022-04-25 DIAGNOSIS — E87.6 HYPOKALEMIA: ICD-10-CM

## 2022-04-25 DIAGNOSIS — F33.1 MODERATE EPISODE OF RECURRENT MAJOR DEPRESSIVE DISORDER (HCC): Primary | ICD-10-CM

## 2022-04-25 DIAGNOSIS — E78.00 PURE HYPERCHOLESTEROLEMIA: ICD-10-CM

## 2022-04-25 PROCEDURE — 1036F TOBACCO NON-USER: CPT | Performed by: NURSE PRACTITIONER

## 2022-04-25 PROCEDURE — 99214 OFFICE O/P EST MOD 30 MIN: CPT | Performed by: NURSE PRACTITIONER

## 2022-04-25 PROCEDURE — G8417 CALC BMI ABV UP PARAM F/U: HCPCS | Performed by: NURSE PRACTITIONER

## 2022-04-25 PROCEDURE — G8427 DOCREV CUR MEDS BY ELIG CLIN: HCPCS | Performed by: NURSE PRACTITIONER

## 2022-04-25 RX ORDER — DESVENLAFAXINE 50 MG/1
50 TABLET, EXTENDED RELEASE ORAL DAILY
Qty: 30 TABLET | Refills: 0 | Status: SHIPPED | OUTPATIENT
Start: 2022-04-25 | End: 2022-05-18

## 2022-04-25 RX ORDER — RISPERIDONE 0.5 MG/1
0.5 TABLET, FILM COATED ORAL 2 TIMES DAILY
Qty: 60 TABLET | Refills: 0 | Status: SHIPPED | OUTPATIENT
Start: 2022-04-25 | End: 2022-06-15

## 2022-04-25 ASSESSMENT — ENCOUNTER SYMPTOMS
SHORTNESS OF BREATH: 0
EYE PAIN: 0
ABDOMINAL DISTENTION: 0
VOMITING: 0
DIARRHEA: 0
ABDOMINAL PAIN: 0
SINUS PRESSURE: 0
EYE REDNESS: 0
NAUSEA: 0
SINUS PAIN: 0
WHEEZING: 0
EYE DISCHARGE: 0
COUGH: 0
SORE THROAT: 0
BACK PAIN: 0

## 2022-04-25 NOTE — PROGRESS NOTES
Shira Ariza (:  1975) is a 51 y.o. female,Established patient, here for evaluation of the following chief complaint(s):  Medication Problem (DISCUSS DEPRESSION MEDICATION AND ANXIETY MEDS)      ASSESSMENT/PLAN:  1. Moderate episode of recurrent major depressive disorder (HCC)  -Uncontrolled at this time. Patient is afraid that she may have been dismissed from her psychiatrist.  Additional mental health resources were given to the patient today. Increasing Pristiq to 50 mg daily. If the patient is able to schedule an appointment with a psychiatrist, she does not need to follow-up with the office for 6 months. If she is unable to schedule with a psychiatrist in a timely manner, she has been instructed to follow-up in 1 month virtually, or sooner if needed. -     desvenlafaxine succinate (PRISTIQ) 50 MG TB24 extended release tablet; Take 1 tablet by mouth daily, Disp-30 tablet, R-0Normal  -     risperiDONE (RISPERDAL) 0.5 MG tablet; Take 1 tablet by mouth 2 times daily, Disp-60 tablet, R-0Normal  2. Abnormal weight gain  -Secondary to depression versus other condition. Labs to further evaluate. No intervention pending results. -     Comprehensive Metabolic Panel; Future  -     CBC with Auto Differential; Future  -     TSH with Reflex; Future  -     Vitamin D 25 Hydroxy; Future  -     Magnesium; Future  -     C-Reactive Protein; Future  -     Sedimentation Rate; Future  3. Generalized body aches  -Arthralgia versus rheumatologic disorder versus fibromyalgia. Labs to further evaluate. No intervention pending results. Patient does see pain management. -     CBC with Auto Differential; Future  -     Magnesium; Future  -     C-Reactive Protein; Future  -     Sedimentation Rate; Future  4. Pure hypercholesterolemia  -Has not had labs in quite some time. Recheck at outpatient lab. -     Lipid Panel; Future  5. Hypokalemia  -Continues potassium supplement. Check with other labs.   - Comprehensive Metabolic Panel; Future      Return in about 6 months (around 10/25/2022) for Follow-up hypertension unless unable to see psych in a timely manner. Then 1 month virtual..    SUBJECTIVE/OBJECTIVE:  EAMON Ordaz presents today with her sister with multiple complaints. First, she states that her depression and anxiety have been out of control. She is following up with psych. However, she states that she has had some issues related to attending appointments due to miscommunication. She is afraid that the practice is going to be dismissing her. She has been trying to call them and has not yet received response. She continues to take Pristiq and Risperdal.  States that this regimen is not working with her at this time. She states her depression has been a problem. She is not motivated. Stays around the house a lot. She is also noted she has been increasing her food intake. She has gained a significant amount of weight and is not sure if this is related to her mood or because of something else. She has noted that her legs get swollen sometimes as well. Aside from the weight gain and mood issues, the patient also states she has been having generalized body aches. She does continue to follow with pain management. She feels like despite the pain management this is gotten worse. She is wondering if there is any blood work that can help tell her what the causes. She is not fasting for labs today but is agreeable to obtaining them at an outpatient lab at a later date. Review of Systems   Constitutional: Positive for unexpected weight change. Negative for chills and fever. HENT: Negative for ear discharge, ear pain, hearing loss, sinus pressure, sinus pain and sore throat. Eyes: Negative for pain, discharge and redness. Respiratory: Negative for cough, shortness of breath and wheezing. Cardiovascular: Positive for leg swelling. Negative for chest pain and palpitations.

## 2022-04-26 ENCOUNTER — HOSPITAL ENCOUNTER (OUTPATIENT)
Age: 47
Discharge: HOME | End: 2022-04-26
Payer: COMMERCIAL

## 2022-04-26 DIAGNOSIS — E87.6 HYPOKALEMIA: ICD-10-CM

## 2022-04-26 DIAGNOSIS — R52 GENERALIZED BODY ACHES: ICD-10-CM

## 2022-04-26 DIAGNOSIS — R63.5 ABNORMAL WEIGHT GAIN: ICD-10-CM

## 2022-04-26 DIAGNOSIS — E78.00 PURE HYPERCHOLESTEROLEMIA: ICD-10-CM

## 2022-04-26 LAB
A/G RATIO: 1.8 (ref 1.1–2.2)
ALBUMIN SERPL-MCNC: 4.6 G/DL (ref 3.4–5)
ALP BLD-CCNC: 84 U/L (ref 40–129)
ALT SERPL-CCNC: 7 U/L (ref 10–40)
ANION GAP SERPL CALCULATED.3IONS-SCNC: 13 MMOL/L (ref 3–16)
AST SERPL-CCNC: 12 U/L (ref 15–37)
BASOPHILS ABSOLUTE: 0.1 K/UL (ref 0–0.2)
BASOPHILS RELATIVE PERCENT: 1.3 %
BILIRUB SERPL-MCNC: <0.2 MG/DL (ref 0–1)
BUN BLDV-MCNC: 14 MG/DL (ref 7–20)
C-REACTIVE PROTEIN: 16.4 MG/L (ref 0–5.1)
CALCIUM SERPL-MCNC: 9.6 MG/DL (ref 8.3–10.6)
CHLORIDE BLD-SCNC: 104 MMOL/L (ref 99–110)
CHOLESTEROL, TOTAL: 203 MG/DL (ref 0–199)
CO2: 25 MMOL/L (ref 21–32)
CREAT SERPL-MCNC: 1 MG/DL (ref 0.6–1.1)
EOSINOPHILS ABSOLUTE: 0.2 K/UL (ref 0–0.6)
EOSINOPHILS RELATIVE PERCENT: 2.2 %
GFR AFRICAN AMERICAN: >60
GFR NON-AFRICAN AMERICAN: 59
GLUCOSE BLD-MCNC: 93 MG/DL (ref 70–99)
HCT VFR BLD CALC: 36.5 % (ref 36–48)
HDLC SERPL-MCNC: 50 MG/DL (ref 40–60)
HEMOGLOBIN: 12.5 G/DL (ref 12–16)
LDL CHOLESTEROL CALCULATED: 124 MG/DL
LYMPHOCYTES ABSOLUTE: 2.1 K/UL (ref 1–5.1)
LYMPHOCYTES RELATIVE PERCENT: 20.5 %
MAGNESIUM: 2.1 MG/DL (ref 1.8–2.4)
MCH RBC QN AUTO: 30.6 PG (ref 26–34)
MCHC RBC AUTO-ENTMCNC: 34.3 G/DL (ref 31–36)
MCV RBC AUTO: 89.2 FL (ref 80–100)
MONOCYTES ABSOLUTE: 0.7 K/UL (ref 0–1.3)
MONOCYTES RELATIVE PERCENT: 7 %
NEUTROPHILS ABSOLUTE: 7.1 K/UL (ref 1.7–7.7)
NEUTROPHILS RELATIVE PERCENT: 69 %
PDW BLD-RTO: 13.5 % (ref 12.4–15.4)
PLATELET # BLD: 316 K/UL (ref 135–450)
PMV BLD AUTO: 7.6 FL (ref 5–10.5)
POTASSIUM SERPL-SCNC: 4.3 MMOL/L (ref 3.5–5.1)
PRO-BNP: 252 PG/ML (ref 0–124)
RBC # BLD: 4.09 M/UL (ref 4–5.2)
SEDIMENTATION RATE, ERYTHROCYTE: 30 MM/HR (ref 0–20)
SODIUM BLD-SCNC: 142 MMOL/L (ref 136–145)
TOTAL PROTEIN: 7.2 G/DL (ref 6.4–8.2)
TRIGL SERPL-MCNC: 143 MG/DL (ref 0–150)
TSH REFLEX: 1.08 UIU/ML (ref 0.27–4.2)
VITAMIN D 25-HYDROXY: 31.5 NG/ML
VLDLC SERPL CALC-MCNC: 29 MG/DL
WBC # BLD: 10.3 K/UL (ref 4–11)

## 2022-04-26 PROCEDURE — 83880 ASSAY OF NATRIURETIC PEPTIDE: CPT

## 2022-04-26 PROCEDURE — 80053 COMPREHEN METABOLIC PANEL: CPT

## 2022-04-26 PROCEDURE — 83735 ASSAY OF MAGNESIUM: CPT

## 2022-04-26 PROCEDURE — 84443 ASSAY THYROID STIM HORMONE: CPT

## 2022-04-26 PROCEDURE — 85025 COMPLETE CBC W/AUTO DIFF WBC: CPT

## 2022-04-26 PROCEDURE — 85652 RBC SED RATE AUTOMATED: CPT

## 2022-04-26 PROCEDURE — 82306 VITAMIN D 25 HYDROXY: CPT

## 2022-04-26 PROCEDURE — 80061 LIPID PANEL: CPT

## 2022-04-26 PROCEDURE — 86140 C-REACTIVE PROTEIN: CPT

## 2022-04-26 PROCEDURE — 36415 COLL VENOUS BLD VENIPUNCTURE: CPT

## 2022-04-28 ENCOUNTER — TELEPHONE (OUTPATIENT)
Dept: FAMILY MEDICINE CLINIC | Age: 47
End: 2022-04-28

## 2022-04-28 DIAGNOSIS — R52 GENERALIZED BODY ACHES: Primary | ICD-10-CM

## 2022-04-28 DIAGNOSIS — R79.82 ELEVATED C-REACTIVE PROTEIN (CRP): ICD-10-CM

## 2022-04-28 DIAGNOSIS — R70.0 ELEVATED SED RATE: ICD-10-CM

## 2022-04-28 NOTE — TELEPHONE ENCOUNTER
PT ADVISED ON LAB RESULTS. SHE IS WILLING TO SEE RHEUMATOLOGY, SHE WOULD LIKE TO GO TO [facility].  SC

## 2022-05-18 DIAGNOSIS — F33.1 MODERATE EPISODE OF RECURRENT MAJOR DEPRESSIVE DISORDER (HCC): ICD-10-CM

## 2022-05-18 RX ORDER — DESVENLAFAXINE 50 MG/1
TABLET, EXTENDED RELEASE ORAL
Qty: 30 TABLET | Refills: 2 | Status: SHIPPED | OUTPATIENT
Start: 2022-05-18 | End: 2022-08-17 | Stop reason: SDUPTHER

## 2022-06-15 DIAGNOSIS — F33.1 MODERATE EPISODE OF RECURRENT MAJOR DEPRESSIVE DISORDER (HCC): ICD-10-CM

## 2022-06-15 RX ORDER — RISPERIDONE 0.5 MG/1
TABLET, FILM COATED ORAL
Qty: 60 TABLET | Refills: 0 | Status: SHIPPED | OUTPATIENT
Start: 2022-06-15 | End: 2022-08-24 | Stop reason: SDUPTHER

## 2022-08-17 ENCOUNTER — TELEPHONE (OUTPATIENT)
Dept: FAMILY MEDICINE CLINIC | Age: 47
End: 2022-08-17

## 2022-08-17 DIAGNOSIS — I10 ESSENTIAL HYPERTENSION: ICD-10-CM

## 2022-08-17 DIAGNOSIS — F33.1 MODERATE EPISODE OF RECURRENT MAJOR DEPRESSIVE DISORDER (HCC): ICD-10-CM

## 2022-08-17 DIAGNOSIS — K21.9 GASTROESOPHAGEAL REFLUX DISEASE WITHOUT ESOPHAGITIS: ICD-10-CM

## 2022-08-17 DIAGNOSIS — E87.6 HYPOKALEMIA: ICD-10-CM

## 2022-08-17 RX ORDER — POTASSIUM CHLORIDE 750 MG/1
10 TABLET, EXTENDED RELEASE ORAL DAILY
Qty: 90 TABLET | Refills: 0 | Status: SHIPPED | OUTPATIENT
Start: 2022-08-17 | End: 2022-10-04

## 2022-08-17 RX ORDER — ESOMEPRAZOLE MAGNESIUM 40 MG/1
40 CAPSULE, DELAYED RELEASE ORAL
Qty: 90 CAPSULE | Refills: 0 | Status: SHIPPED | OUTPATIENT
Start: 2022-08-17 | End: 2022-11-01 | Stop reason: SDUPTHER

## 2022-08-17 RX ORDER — DESVENLAFAXINE 50 MG/1
TABLET, EXTENDED RELEASE ORAL
Qty: 30 TABLET | Refills: 0 | Status: SHIPPED | OUTPATIENT
Start: 2022-08-17

## 2022-08-17 RX ORDER — LOSARTAN POTASSIUM 100 MG/1
100 TABLET ORAL DAILY
Qty: 90 TABLET | Refills: 0 | Status: SHIPPED | OUTPATIENT
Start: 2022-08-17 | End: 2022-11-01 | Stop reason: SDUPTHER

## 2022-08-18 ENCOUNTER — TELEPHONE (OUTPATIENT)
Dept: FAMILY MEDICINE CLINIC | Age: 47
End: 2022-08-18

## 2022-08-18 DIAGNOSIS — I10 ESSENTIAL HYPERTENSION: ICD-10-CM

## 2022-08-18 RX ORDER — AMLODIPINE BESYLATE 5 MG/1
5 TABLET ORAL DAILY
Qty: 90 TABLET | Refills: 0 | Status: SHIPPED | OUTPATIENT
Start: 2022-08-18 | End: 2022-11-01 | Stop reason: SDUPTHER

## 2022-08-24 ENCOUNTER — TELEPHONE (OUTPATIENT)
Dept: FAMILY MEDICINE CLINIC | Age: 47
End: 2022-08-24

## 2022-08-24 DIAGNOSIS — F33.1 MODERATE EPISODE OF RECURRENT MAJOR DEPRESSIVE DISORDER (HCC): ICD-10-CM

## 2022-08-24 RX ORDER — RISPERIDONE 0.5 MG/1
TABLET, FILM COATED ORAL
Qty: 180 TABLET | Refills: 0 | Status: SHIPPED | OUTPATIENT
Start: 2022-08-24

## 2022-08-26 DIAGNOSIS — J30.2 SEASONAL ALLERGIC RHINITIS, UNSPECIFIED TRIGGER: ICD-10-CM

## 2022-08-26 DIAGNOSIS — E87.6 HYPOKALEMIA: ICD-10-CM

## 2022-08-26 RX ORDER — CETIRIZINE HYDROCHLORIDE 10 MG/1
TABLET ORAL
Qty: 90 TABLET | Refills: 0 | Status: SHIPPED | OUTPATIENT
Start: 2022-08-26

## 2022-08-26 RX ORDER — POTASSIUM CHLORIDE 750 MG/1
TABLET, EXTENDED RELEASE ORAL
Qty: 90 TABLET | Refills: 0 | OUTPATIENT
Start: 2022-08-26

## 2022-10-03 DIAGNOSIS — E87.6 HYPOKALEMIA: ICD-10-CM

## 2022-10-04 RX ORDER — POTASSIUM CHLORIDE 750 MG/1
10 TABLET, EXTENDED RELEASE ORAL DAILY
Qty: 30 TABLET | Refills: 0 | Status: SHIPPED | OUTPATIENT
Start: 2022-10-04 | End: 2022-11-01 | Stop reason: SDUPTHER

## 2022-11-01 DIAGNOSIS — E87.6 HYPOKALEMIA: ICD-10-CM

## 2022-11-01 DIAGNOSIS — K21.9 GASTROESOPHAGEAL REFLUX DISEASE WITHOUT ESOPHAGITIS: ICD-10-CM

## 2022-11-01 DIAGNOSIS — N32.81 OAB (OVERACTIVE BLADDER): ICD-10-CM

## 2022-11-01 DIAGNOSIS — I10 ESSENTIAL HYPERTENSION: ICD-10-CM

## 2022-11-01 RX ORDER — ESOMEPRAZOLE MAGNESIUM 40 MG/1
40 CAPSULE, DELAYED RELEASE ORAL
Qty: 30 CAPSULE | Refills: 0 | Status: SHIPPED | OUTPATIENT
Start: 2022-11-01 | End: 2022-11-17 | Stop reason: SDUPTHER

## 2022-11-01 RX ORDER — POTASSIUM CHLORIDE 750 MG/1
10 TABLET, EXTENDED RELEASE ORAL DAILY
Qty: 30 TABLET | Refills: 0 | Status: SHIPPED | OUTPATIENT
Start: 2022-11-01 | End: 2022-11-17 | Stop reason: SDUPTHER

## 2022-11-01 RX ORDER — LOSARTAN POTASSIUM 100 MG/1
100 TABLET ORAL DAILY
Qty: 30 TABLET | Refills: 0 | Status: SHIPPED | OUTPATIENT
Start: 2022-11-01 | End: 2022-11-17 | Stop reason: SDUPTHER

## 2022-11-01 RX ORDER — AMLODIPINE BESYLATE 5 MG/1
5 TABLET ORAL DAILY
Qty: 30 TABLET | Refills: 0 | Status: SHIPPED | OUTPATIENT
Start: 2022-11-01 | End: 2022-11-17 | Stop reason: SDUPTHER

## 2022-11-01 RX ORDER — OXYBUTYNIN CHLORIDE 10 MG/1
10 TABLET, EXTENDED RELEASE ORAL DAILY
Qty: 30 TABLET | Refills: 0 | Status: SHIPPED | OUTPATIENT
Start: 2022-11-01 | End: 2022-11-17 | Stop reason: SDUPTHER

## 2022-11-01 NOTE — TELEPHONE ENCOUNTER
----- Message from Dafter Diannaradha sent at 10/31/2022 12:23 PM EDT -----  Subject: Refill Request    QUESTIONS  Name of Medication? losartan (COZAAR) 100 MG tablet  Patient-reported dosage and instructions? 100 MG once a day  How many days do you have left? 3  Preferred Pharmacy? [facility]/PHARMACY #[id number]  Pharmacy phone number (if available)? [phone]  Additional Information for Provider? PT has an appointment 11/17/2022 but   will run out of medication before that.   ---------------------------------------------------------------------------  --------------,  Name of Medication? amLODIPine (NORVASC) 5 MG tablet  Patient-reported dosage and instructions? 5 MG once a day  How many days do you have left? 3  Preferred Pharmacy? [facility]/PHARMACY #[id number]  Pharmacy phone number (if available)? [phone]  Additional Information for Provider? PT has an appointment 11/17/2022 but   will run out of medication before that.   ---------------------------------------------------------------------------  --------------,  Name of Medication? potassium chloride (KLOR-CON) 10 MEQ extended release   tablet  Patient-reported dosage and instructions? 10 MEQ once a day  How many days do you have left? 3  Preferred Pharmacy? [facility]/PHARMACY #[id number]  Pharmacy phone number (if available)? [phone]  Additional Information for Provider? PT has an appointment 11/17/2022 but   will run out of medication before that.   ---------------------------------------------------------------------------  --------------,  Name of Medication? esomeprazole (NEXIUM) 40 MG delayed release capsule  Patient-reported dosage and instructions? 40MG once a day  How many days do you have left? 3  Preferred Pharmacy? [facility]/PHARMACY #[id number]  Pharmacy phone number (if available)? [phone]  Additional Information for Provider? PT has an appointment 11/17/2022 but   will run out of medication before that. ---------------------------------------------------------------------------  --------------,  Name of Medication? oxybutynin (DITROPAN XL) 10 MG extended release tablet  Patient-reported dosage and instructions? 10MG once a day  How many days do you have left? 3  Preferred Pharmacy? [facility]/PHARMACY #[id number]  Pharmacy phone number (if available)? [phone]  Additional Information for Provider? PT has an appointment 11/17/2022 but   will run out of medication before that.   ---------------------------------------------------------------------------  --------------  [address]  What is the best way for the office to contact you? OK to leave message on   Pingboard, OK to respond with electronic message via [employer] portal (only   for patients who have registered [employer] account)  Preferred Call Back Phone Number? [phone]  ---------------------------------------------------------------------------  --------------  SCRIPT ANSWERS  Relationship to Patient?  Self

## 2022-11-17 ENCOUNTER — OFFICE VISIT (OUTPATIENT)
Dept: FAMILY MEDICINE CLINIC | Age: 47
End: 2022-11-17
Payer: COMMERCIAL

## 2022-11-17 VITALS
DIASTOLIC BLOOD PRESSURE: 72 MMHG | SYSTOLIC BLOOD PRESSURE: 116 MMHG | WEIGHT: 208 LBS | HEART RATE: 78 BPM | OXYGEN SATURATION: 98 % | BODY MASS INDEX: 30.81 KG/M2 | HEIGHT: 69 IN

## 2022-11-17 DIAGNOSIS — I10 ESSENTIAL HYPERTENSION: ICD-10-CM

## 2022-11-17 DIAGNOSIS — J18.9 PNEUMONIA OF LEFT LOWER LOBE DUE TO INFECTIOUS ORGANISM: ICD-10-CM

## 2022-11-17 DIAGNOSIS — N32.81 OAB (OVERACTIVE BLADDER): ICD-10-CM

## 2022-11-17 DIAGNOSIS — E87.6 HYPOKALEMIA: ICD-10-CM

## 2022-11-17 DIAGNOSIS — U07.1 COVID-19: ICD-10-CM

## 2022-11-17 DIAGNOSIS — H69.91 UNSPECIFIED EUSTACHIAN TUBE DISORDER, RIGHT EAR: ICD-10-CM

## 2022-11-17 DIAGNOSIS — J30.2 SEASONAL ALLERGIC RHINITIS, UNSPECIFIED TRIGGER: ICD-10-CM

## 2022-11-17 DIAGNOSIS — Z09 HOSPITAL DISCHARGE FOLLOW-UP: Primary | ICD-10-CM

## 2022-11-17 DIAGNOSIS — K21.9 GASTROESOPHAGEAL REFLUX DISEASE WITHOUT ESOPHAGITIS: ICD-10-CM

## 2022-11-17 PROCEDURE — G8427 DOCREV CUR MEDS BY ELIG CLIN: HCPCS | Performed by: NURSE PRACTITIONER

## 2022-11-17 PROCEDURE — 3074F SYST BP LT 130 MM HG: CPT | Performed by: NURSE PRACTITIONER

## 2022-11-17 PROCEDURE — 99215 OFFICE O/P EST HI 40 MIN: CPT | Performed by: NURSE PRACTITIONER

## 2022-11-17 PROCEDURE — 3078F DIAST BP <80 MM HG: CPT | Performed by: NURSE PRACTITIONER

## 2022-11-17 PROCEDURE — G8484 FLU IMMUNIZE NO ADMIN: HCPCS | Performed by: NURSE PRACTITIONER

## 2022-11-17 PROCEDURE — 1111F DSCHRG MED/CURRENT MED MERGE: CPT | Performed by: NURSE PRACTITIONER

## 2022-11-17 PROCEDURE — G8417 CALC BMI ABV UP PARAM F/U: HCPCS | Performed by: NURSE PRACTITIONER

## 2022-11-17 PROCEDURE — 1036F TOBACCO NON-USER: CPT | Performed by: NURSE PRACTITIONER

## 2022-11-17 RX ORDER — AMLODIPINE BESYLATE 5 MG/1
5 TABLET ORAL DAILY
Qty: 90 TABLET | Refills: 1 | Status: SHIPPED | OUTPATIENT
Start: 2022-11-17

## 2022-11-17 RX ORDER — POTASSIUM CHLORIDE 750 MG/1
10 TABLET, EXTENDED RELEASE ORAL DAILY
Qty: 90 TABLET | Refills: 1 | Status: SHIPPED | OUTPATIENT
Start: 2022-11-17 | End: 2022-12-17

## 2022-11-17 RX ORDER — CETIRIZINE HYDROCHLORIDE 10 MG/1
TABLET ORAL
Qty: 90 TABLET | Refills: 0 | Status: SHIPPED | OUTPATIENT
Start: 2022-11-17

## 2022-11-17 RX ORDER — BREXPIPRAZOLE 0.25 MG/1
TABLET ORAL
COMMUNITY
Start: 2022-09-28

## 2022-11-17 RX ORDER — LOSARTAN POTASSIUM 100 MG/1
100 TABLET ORAL DAILY
Qty: 90 TABLET | Refills: 1 | Status: SHIPPED | OUTPATIENT
Start: 2022-11-17

## 2022-11-17 RX ORDER — OXYBUTYNIN CHLORIDE 10 MG/1
10 TABLET, EXTENDED RELEASE ORAL DAILY
Qty: 90 TABLET | Refills: 1 | Status: SHIPPED | OUTPATIENT
Start: 2022-11-17

## 2022-11-17 RX ORDER — ESOMEPRAZOLE MAGNESIUM 40 MG/1
40 CAPSULE, DELAYED RELEASE ORAL
Qty: 90 CAPSULE | Refills: 1 | Status: SHIPPED | OUTPATIENT
Start: 2022-11-17

## 2022-11-17 RX ORDER — BENZONATATE 100 MG/1
CAPSULE ORAL
COMMUNITY
Start: 2022-10-28

## 2022-11-17 RX ORDER — IPRATROPIUM BROMIDE AND ALBUTEROL SULFATE 2.5; .5 MG/3ML; MG/3ML
1 SOLUTION RESPIRATORY (INHALATION) EVERY 4 HOURS
Qty: 540 ML | Refills: 0 | Status: SHIPPED | OUTPATIENT
Start: 2022-11-17 | End: 2022-12-17

## 2022-11-17 RX ORDER — FLUTICASONE PROPIONATE 50 MCG
2 SPRAY, SUSPENSION (ML) NASAL DAILY
Qty: 16 G | Refills: 0 | Status: SHIPPED | OUTPATIENT
Start: 2022-11-17

## 2022-11-17 NOTE — PROGRESS NOTES
Post-Discharge Transitional Care  Follow Up      Chun Castro   YOB: 1975    Date of Office Visit:  11/17/2022  Date of Hospital Admission: 10/25/22  Date of Hospital Discharge: 10/29/22  Risk of hospital readmission (high >=14%. Medium >=10%) :No data recorded    Care management risk score Rising risk (score 2-5) and Complex Care (Scores >=6): No Risk Score On File     Non face to face  following discharge, date last encounter closed (first attempt may have been earlier): *No documented post hospital discharge outreach found in the last 14 days    Call initiated 2 business days of discharge: *No response recorded in the last 14 days    ASSESSMENT/PLAN:   Hospital discharge [facility] [facility] in the emergency room notes and results reviewed. Medications reconciled. -     AL DISCHARGE MEDS RECONCILED W/ CURRENT OUTPATIENT MED LIST  Pneumonia of left lower lobe due to infectious organism  [facility] and emergency room notes and results reviewed. Medications reconciled. The patient still has persistent wheezing. Appropriate for a DuoNeb. Sending to the pharmacy. Giving the nebulizer to the patient in office today. Educated on medication use as well as side effects. Follow-up as needed if no improvement. -     ipratropium-albuterol (DUONEB) 0.5-2.5 (3) MG/3ML SOLN nebulizer solution; Inhale 3 mLs into the lungs every 4 hours, Disp-540 mL, R-0Can sub for premixed if this is not the correct order pleaseNormal  COVID-19  [facility] and emergency room notes and results reviewed. Medications reconciled. The patient still has persistent wheezing. Appropriate for a DuoNeb. Sending to the pharmacy. Giving the nebulizer to the patient in office today. Educated on medication use as well as side effects. Follow-up as needed if no improvement. -     ipratropium-albuterol (DUONEB) 0.5-2.5 (3) MG/3ML SOLN nebulizer solution;  Inhale 3 mLs into the lungs every 4 hours, Disp-540 mL, R-0Can sub for premixed if this is not the correct order pleaseNormal  Essential hypertension  -Controlled on current combination of losartan and amlodipine. No changes today. Refill.  -     losartan (COZAAR) 100 MG tablet; Take 1 tablet by mouth daily, Disp-90 tablet, R-1Normal  -     amLODIPine (NORVASC) 5 MG tablet; Take 1 tablet by mouth daily, Disp-90 tablet, R-1Normal  Hypokalemia  -Reviewed hospital results. Mild hypokalemia still. Continue supplement as ordered. We will check labs at next appointment in 6 months. Consider supplement increase if persistently low. -     potassium chloride (KLOR-CON M10) 10 MEQ extended release tablet; Take 1 tablet by mouth daily, Disp-90 tablet, R-1Normal  Gastroesophageal reflux disease without esophagitis  -Controlled with Nexium. No changes today. Refill.  -     esomeprazole (NEXIUM) 40 MG delayed release capsule; Take 1 capsule by mouth every morning (before breakfast), Disp-90 capsule, R-1Normal  OAB (overactive bladder)  -Controlled with oxybutynin. No changes today. Refill.  -     oxybutynin (DITROPAN XL) 10 MG extended release tablet; Take 1 tablet by mouth daily, Disp-90 tablet, R-1Normal  Seasonal allergic rhinitis, unspecified trigger  -Continue Zyrtec. No changes today. Refill.  -     cetirizine (ZYRTEC) 10 MG tablet; TAKE 1 TABLET BY MOUTH EVERY DAY, Disp-90 tablet, R-0Normal  Unspecified eustachian tube disorder, right ear  -The patient has a eustachian tube disorder on the right side. Sending Flonase to pharmacy. Educated on medication use as well as side effects. Follow-up as needed. -     fluticasone (FLONASE) 50 MCG/ACT nasal spray; 2 sprays by Each Nostril route daily, Disp-16 g, R-0Normal    Medical Decision Making: high complexity  Return in about 6 months (around 5/17/2023) for Annual Physical/Fasting Labs.     On this date 11/17/2022 I have spent 45 minutes reviewing previous notes, test results and face to face with the patient discussing the diagnosis and importance of compliance with the treatment plan as well as documenting on the day of the visit. Subjective:   HPI:  Follow up of Hospital problems/diagnosis(es):   Community-acquired pneumonia of the left lower lobe  COVID-19    Inpatient course: Discharge summary reviewed- see chart. Interval history/Current status:  Elizabeth Flores presents today for hospital discharge follow-up, emergency room discharge follow-up, and routine medication follow-up. She was admitted at [facility] inpatient from 10/25 until 10/29 with pneumonia. During that time she received breathing treatments as well as antibiotics. She was discharged home. She started feeling worse on 11/9 and went to [facility] ER. Was tested and diagnosed with COVID-19. She was COVID-19 negative in the hospital.  She states she is still having some chest tightness as well as shortness of breath. Denies any fevers. States that she still has significant chest congestion but has difficulties coughing it up. She is inquiring about a nebulizer. Felt that helped quite a bit in the hospital.  She also has Mucinex available at home but states she has not been taking this. She also states that she has had some persistent right ear pain and would like to have this looked at. The patient continues to take medications as ordered. Tolerates without side effects. Has not noted any issues with her blood pressure. No issues with reflux. She still sees a psychiatrist.  They changed her medication regimen. Currently on Pristiq and Rexulti. Feels that this regimen is working well for her. Does not feel any medications need to be changed. Labs are up-to-date. Had colonoscopy last year with UC which cleared her for 10 years. Cannot get flu shot here and would like to hold off given that she is sick.       Patient Active Problem List   Diagnosis    Otitis externa    PVD (peripheral vascular disease) ([address]    Thromboangiitis ([address]    Essential hypertension    Tobacco abuse    Plantar fasciitis    Depression    Tendonitis of ankle    Anxiety    Mild single current episode of major depressive disorder (HCC)    Vitamin D deficiency    Pure hypercholesterolemia    Major depressive disorder with single episode, in remission ([address]    Hypokalemia    Intractable chronic migraine without aura and without status migrainosus       Medications listed as ordered at the time of discharge from hospital     Medication List            Accurate as of November 17, 2022 12:18 PM. If you have any questions, ask your nurse or doctor.                 START taking these medications      fluticasone 50 MCG/ACT nasal spray  Commonly known as: FLONASE  2 sprays by Each Nostril route daily  Started by: CATRACHO Santana CNP     ipratropium-albuterol 0.5-2.5 (3) MG/3ML Soln nebulizer solution  Commonly known as: DUONEB  Inhale 3 mLs into the lungs every 4 hours  Started by: CATRACHO Santana CNP            CHANGE how you take these medications      amLODIPine 5 MG tablet  Commonly known as: NORVASC  Take 1 tablet by mouth daily  What changed: additional instructions  Changed by: CATRACHO Santana CNP     losartan 100 MG tablet  Commonly known as: COZAAR  Take 1 tablet by mouth daily  What changed: additional instructions  Changed by: CATRACHO Santana CNP            CONTINUE taking these medications      benzonatate 100 MG capsule  Commonly known as: TESSALON     cetirizine 10 MG tablet  Commonly known as: ZYRTEC  TAKE 1 TABLET BY MOUTH EVERY DAY     desvenlafaxine succinate 50 MG Tb24 extended release tablet  Commonly known as: PRISTIQ  TAKE 1 TABLET BY MOUTH EVERY DAY     esomeprazole 40 MG delayed release capsule  Commonly known as: NEXIUM  Take 1 capsule by mouth every morning (before breakfast)     estradiol 1 MG tablet  Commonly known as: ESTRACE     HYDROcodone-acetaminophen 5-325 MG per tablet  Commonly known as: NORCO     oxybutynin 10 MG extended release tablet  Commonly known as: Ditropan XL  Take 1 tablet by mouth daily     potassium chloride 10 MEQ extended release tablet  Commonly known as: Klor-Con M10  Take 1 tablet by mouth daily     Rexulti 0.25 MG Tabs tablet  Generic drug: brexpiprazole     SUMAtriptan 100 MG tablet  Commonly known as: IMITREX  Take 1 tablet by mouth once as needed for Migraine               Where to Get Your Medications        These medications were sent to [address] [place] [address] Sandra Beaver  Terri Delgado [phone] Trip Mcgregor [phone]  [facility] [id number]      Hours: 24-hours Phone: [phone]   amLODIPine 5 MG tablet  cetirizine 10 MG tablet  esomeprazole 40 MG delayed release capsule  fluticasone 50 MCG/ACT nasal spray  ipratropium-albuterol 0.5-2.5 (3) MG/3ML Soln nebulizer solution  losartan 100 MG tablet  oxybutynin 10 MG extended release tablet  potassium chloride 10 MEQ extended release tablet           Medications marked \"taking\" at this time  Outpatient Medications Marked as Taking for the 11/17/22 encounter (Office Visit) with CATRACHO Levine CNP   Medication Sig Dispense Refill    losartan (COZAAR) 100 MG tablet Take 1 tablet by mouth daily 90 tablet 1    amLODIPine (NORVASC) 5 MG tablet Take 1 tablet by mouth daily 90 tablet 1    potassium chloride (KLOR-CON M10) 10 MEQ extended release tablet Take 1 tablet by mouth daily 90 tablet 1    esomeprazole (NEXIUM) 40 MG delayed release capsule Take 1 capsule by mouth every morning (before breakfast) 90 capsule 1    oxybutynin (DITROPAN XL) 10 MG extended release tablet Take 1 tablet by mouth daily 90 tablet 1    cetirizine (ZYRTEC) 10 MG tablet TAKE 1 TABLET BY MOUTH EVERY DAY 90 tablet 0    ipratropium-albuterol (DUONEB) 0.5-2.5 (3) MG/3ML SOLN nebulizer solution Inhale 3 mLs into the lungs every 4 hours 540 mL 0    fluticasone (FLONASE) 50 MCG/ACT nasal spray 2 sprays by Each Nostril route daily 16 g 0        Medications patient taking as of now reconciled against medications ordered at time of hospital discharge: Yes    A comprehensive review of systems was negative except for what was noted in the HPI. Objective:    /72 (Site: Left Upper Arm, Position: Sitting, Cuff Size: Medium Adult)   Pulse 78   Ht 5' 9\" (1.753 m)   Wt 208 lb (94.3 kg)   LMP 04/11/2015 (Exact Date)   SpO2 98%   BMI 30.72 kg/m²   General Appearance: alert and oriented to person, place and time, well developed and well- nourished, in no acute distress  Skin: warm and dry, no rash or erythema  Head: normocephalic and atraumatic  Eyes: pupils equal, round, and reactive to light, extraocular eye movements intact, conjunctivae normal  ENT: Right middle ear effusion. Ear canal normal bilaterally, nose without deformity, nasal mucosa and turbinates normal without polyps  Neck: supple and non-tender without mass, no thyromegaly or thyroid nodules, no cervical lymphadenopathy  Pulmonary/Chest: Inspiratory wheezing bilaterally. No rales or rhonchi, normal air movement, no respiratory distress  Cardiovascular: normal rate, regular rhythm, normal S1 and S2, no murmurs, rubs, clicks, or gallops, distal pulses intact, no carotid bruits  Abdomen: soft, non-tender, non-distended, normal bowel sounds, no masses or organomegaly  Extremities: no cyanosis, clubbing or edema  Musculoskeletal: normal range of motion, no joint swelling, deformity or tenderness  Neurologic: reflexes normal and symmetric, no cranial nerve deficit, gait, coordination and speech normal      An electronic signature was used to authenticate this note.   --CATRACHO Beatty - CNP

## 2022-12-12 DIAGNOSIS — H69.91 UNSPECIFIED EUSTACHIAN TUBE DISORDER, RIGHT EAR: ICD-10-CM

## 2022-12-12 RX ORDER — FLUTICASONE PROPIONATE 50 MCG
SPRAY, SUSPENSION (ML) NASAL
Qty: 16 G | Refills: 0 | Status: SHIPPED | OUTPATIENT
Start: 2022-12-12

## 2022-12-12 NOTE — TELEPHONE ENCOUNTER
LV 11/17/22 WITH TR NV NONE  Return in about 6 months (around 5/17/2023) for Annual Physical/Fasting Labs.

## 2023-01-04 DIAGNOSIS — H69.91 UNSPECIFIED EUSTACHIAN TUBE DISORDER, RIGHT EAR: ICD-10-CM

## 2023-01-04 RX ORDER — FLUTICASONE PROPIONATE 50 MCG
SPRAY, SUSPENSION (ML) NASAL
Qty: 1 EACH | Refills: 2 | Status: SHIPPED | OUTPATIENT
Start: 2023-01-04

## 2023-01-20 DIAGNOSIS — J18.9 PNEUMONIA OF LEFT LOWER LOBE DUE TO INFECTIOUS ORGANISM: ICD-10-CM

## 2023-01-20 DIAGNOSIS — U07.1 COVID-19: ICD-10-CM

## 2023-01-20 RX ORDER — IPRATROPIUM BROMIDE AND ALBUTEROL SULFATE 2.5; .5 MG/3ML; MG/3ML
1 SOLUTION RESPIRATORY (INHALATION) EVERY 4 HOURS
Qty: 540 ML | Refills: 0 | Status: SHIPPED | OUTPATIENT
Start: 2023-01-20 | End: 2023-02-19

## 2023-01-20 NOTE — TELEPHONE ENCOUNTER
Future Appointments    This patient does not currently have any appointments scheduled.   Past Visits    Date Provider Specialty Visit Type Primary Dx   11/17/2022 CATRACHO Jean. [facility] [address] 57 discharge follow-up

## 2023-02-14 DIAGNOSIS — J30.2 SEASONAL ALLERGIC RHINITIS, UNSPECIFIED TRIGGER: ICD-10-CM

## 2023-02-14 RX ORDER — CETIRIZINE HYDROCHLORIDE 10 MG/1
TABLET ORAL
Qty: 90 TABLET | Refills: 0 | Status: SHIPPED | OUTPATIENT
Start: 2023-02-14

## 2023-02-14 NOTE — TELEPHONE ENCOUNTER
LV 11/17/22 WITH TR FOR HOSP F/U NV NONE  Return in about 6 months (around 5/17/2023) for Annual Physical/Fasting Labs.

## 2023-02-22 DIAGNOSIS — G43.719 INTRACTABLE CHRONIC MIGRAINE WITHOUT AURA AND WITHOUT STATUS MIGRAINOSUS: ICD-10-CM

## 2023-02-22 RX ORDER — SUMATRIPTAN 100 MG/1
100 TABLET, FILM COATED ORAL
Qty: 9 TABLET | Refills: 2 | Status: SHIPPED | OUTPATIENT
Start: 2023-02-22 | End: 2023-03-09 | Stop reason: SDUPTHER

## 2023-03-09 ENCOUNTER — HOSPITAL ENCOUNTER (OUTPATIENT)
Dept: CT IMAGING | Age: 48
Discharge: HOME | End: 2023-03-09
Payer: COMMERCIAL

## 2023-03-09 ENCOUNTER — OFFICE VISIT (OUTPATIENT)
Dept: FAMILY MEDICINE CLINIC | Age: 48
End: 2023-03-09
Payer: COMMERCIAL

## 2023-03-09 VITALS
BODY MASS INDEX: 31.84 KG/M2 | OXYGEN SATURATION: 97 % | HEIGHT: 69 IN | WEIGHT: 215 LBS | DIASTOLIC BLOOD PRESSURE: 82 MMHG | SYSTOLIC BLOOD PRESSURE: 128 MMHG | HEART RATE: 82 BPM

## 2023-03-09 DIAGNOSIS — G43.719 INTRACTABLE CHRONIC MIGRAINE WITHOUT AURA AND WITHOUT STATUS MIGRAINOSUS: ICD-10-CM

## 2023-03-09 DIAGNOSIS — I10 ESSENTIAL HYPERTENSION: ICD-10-CM

## 2023-03-09 DIAGNOSIS — J30.2 SEASONAL ALLERGIC RHINITIS, UNSPECIFIED TRIGGER: ICD-10-CM

## 2023-03-09 DIAGNOSIS — E87.6 HYPOKALEMIA: ICD-10-CM

## 2023-03-09 DIAGNOSIS — N32.81 OAB (OVERACTIVE BLADDER): ICD-10-CM

## 2023-03-09 DIAGNOSIS — R00.2 HEART PALPITATIONS: ICD-10-CM

## 2023-03-09 DIAGNOSIS — R10.9 LEFT FLANK PAIN: ICD-10-CM

## 2023-03-09 DIAGNOSIS — R00.2 HEART PALPITATIONS: Primary | ICD-10-CM

## 2023-03-09 DIAGNOSIS — K21.9 GASTROESOPHAGEAL REFLUX DISEASE WITHOUT ESOPHAGITIS: ICD-10-CM

## 2023-03-09 LAB
A/G RATIO: 1.5 (ref 1.1–2.2)
ALBUMIN SERPL-MCNC: 4.3 G/DL (ref 3.4–5)
ALP BLD-CCNC: 95 U/L (ref 40–129)
ALT SERPL-CCNC: 10 U/L (ref 10–40)
ANION GAP SERPL CALCULATED.3IONS-SCNC: 14 MMOL/L (ref 3–16)
AST SERPL-CCNC: 13 U/L (ref 15–37)
BASOPHILS ABSOLUTE: 0.1 K/UL (ref 0–0.2)
BASOPHILS RELATIVE PERCENT: 0.9 %
BILIRUB SERPL-MCNC: <0.2 MG/DL (ref 0–1)
BILIRUBIN, POC: NORMAL
BLOOD URINE, POC: NORMAL
BUN BLDV-MCNC: 9 MG/DL (ref 7–20)
C-REACTIVE PROTEIN, HIGH SENSITIVITY: 11.26 MG/L (ref 0.16–3)
CALCIUM SERPL-MCNC: 9.8 MG/DL (ref 8.3–10.6)
CHLORIDE BLD-SCNC: 104 MMOL/L (ref 99–110)
CLARITY, POC: CLEAR
CO2: 22 MMOL/L (ref 21–32)
COLOR, POC: YELLOW
CREAT SERPL-MCNC: 1.1 MG/DL (ref 0.6–1.1)
D DIMER: 0.55 UG/ML FEU (ref 0–0.6)
EOSINOPHILS ABSOLUTE: 0.3 K/UL (ref 0–0.6)
EOSINOPHILS RELATIVE PERCENT: 3 %
GFR SERPL CREATININE-BSD FRML MDRD: >60 ML/MIN/{1.73_M2}
GLUCOSE BLD-MCNC: 89 MG/DL (ref 70–99)
GLUCOSE URINE, POC: NORMAL
HCT VFR BLD CALC: 36 % (ref 36–48)
HEMOGLOBIN: 12.6 G/DL (ref 12–16)
KETONES, POC: NORMAL
LEUKOCYTE EST, POC: NORMAL
LYMPHOCYTES ABSOLUTE: 3.2 K/UL (ref 1–5.1)
LYMPHOCYTES RELATIVE PERCENT: 31 %
MAGNESIUM: 2 MG/DL (ref 1.8–2.4)
MCH RBC QN AUTO: 30.4 PG (ref 26–34)
MCHC RBC AUTO-ENTMCNC: 35.1 G/DL (ref 31–36)
MCV RBC AUTO: 86.6 FL (ref 80–100)
MONOCYTES ABSOLUTE: 0.8 K/UL (ref 0–1.3)
MONOCYTES RELATIVE PERCENT: 8 %
NEUTROPHILS ABSOLUTE: 5.8 K/UL (ref 1.7–7.7)
NEUTROPHILS RELATIVE PERCENT: 57.1 %
NITRITE, POC: NORMAL
PDW BLD-RTO: 13.8 % (ref 12.4–15.4)
PH, POC: 6
PLATELET # BLD: 348 K/UL (ref 135–450)
PMV BLD AUTO: 8 FL (ref 5–10.5)
POTASSIUM SERPL-SCNC: 4.1 MMOL/L (ref 3.5–5.1)
PROTEIN, POC: NORMAL
RBC # BLD: 4.15 M/UL (ref 4–5.2)
SEDIMENTATION RATE, ERYTHROCYTE: 30 MM/HR (ref 0–20)
SODIUM BLD-SCNC: 140 MMOL/L (ref 136–145)
SPECIFIC GRAVITY, POC: 1
TOTAL PROTEIN: 7.1 G/DL (ref 6.4–8.2)
TROPONIN: <0.01 NG/ML
UROBILINOGEN, POC: 0.2
WBC # BLD: 10.2 K/UL (ref 4–11)

## 2023-03-09 PROCEDURE — G8427 DOCREV CUR MEDS BY ELIG CLIN: HCPCS

## 2023-03-09 PROCEDURE — 99214 OFFICE O/P EST MOD 30 MIN: CPT

## 2023-03-09 PROCEDURE — 1036F TOBACCO NON-USER: CPT

## 2023-03-09 PROCEDURE — G8484 FLU IMMUNIZE NO ADMIN: HCPCS

## 2023-03-09 PROCEDURE — 36415 COLL VENOUS BLD VENIPUNCTURE: CPT

## 2023-03-09 PROCEDURE — 93000 ELECTROCARDIOGRAM COMPLETE: CPT

## 2023-03-09 PROCEDURE — 81002 URINALYSIS NONAUTO W/O SCOPE: CPT

## 2023-03-09 PROCEDURE — 3074F SYST BP LT 130 MM HG: CPT

## 2023-03-09 PROCEDURE — 74176 CT ABD & PELVIS W/O CONTRAST: CPT

## 2023-03-09 PROCEDURE — G8417 CALC BMI ABV UP PARAM F/U: HCPCS

## 2023-03-09 PROCEDURE — 3079F DIAST BP 80-89 MM HG: CPT

## 2023-03-09 RX ORDER — SUMATRIPTAN 100 MG/1
100 TABLET, FILM COATED ORAL
Qty: 9 TABLET | Refills: 2 | Status: SHIPPED | OUTPATIENT
Start: 2023-03-09 | End: 2023-03-09

## 2023-03-09 RX ORDER — CETIRIZINE HYDROCHLORIDE 10 MG/1
TABLET ORAL
Qty: 90 TABLET | Refills: 3 | Status: SHIPPED | OUTPATIENT
Start: 2023-03-09

## 2023-03-09 RX ORDER — AMLODIPINE BESYLATE 5 MG/1
5 TABLET ORAL DAILY
Qty: 90 TABLET | Refills: 3 | Status: SHIPPED | OUTPATIENT
Start: 2023-03-09

## 2023-03-09 RX ORDER — POTASSIUM CHLORIDE 750 MG/1
10 TABLET, EXTENDED RELEASE ORAL DAILY
Qty: 90 TABLET | Refills: 3 | Status: SHIPPED | OUTPATIENT
Start: 2023-03-09 | End: 2023-04-08

## 2023-03-09 RX ORDER — ESOMEPRAZOLE MAGNESIUM 40 MG/1
40 CAPSULE, DELAYED RELEASE ORAL
Qty: 90 CAPSULE | Refills: 3 | Status: SHIPPED | OUTPATIENT
Start: 2023-03-09

## 2023-03-09 RX ORDER — LOSARTAN POTASSIUM 100 MG/1
100 TABLET ORAL DAILY
Qty: 90 TABLET | Refills: 3 | Status: SHIPPED | OUTPATIENT
Start: 2023-03-09

## 2023-03-09 RX ORDER — OXYBUTYNIN CHLORIDE 10 MG/1
10 TABLET, EXTENDED RELEASE ORAL DAILY
Qty: 90 TABLET | Refills: 3 | Status: SHIPPED | OUTPATIENT
Start: 2023-03-09

## 2023-03-09 SDOH — ECONOMIC STABILITY: INCOME INSECURITY: HOW HARD IS IT FOR YOU TO PAY FOR THE VERY BASICS LIKE FOOD, HOUSING, MEDICAL CARE, AND HEATING?: NOT HARD AT ALL

## 2023-03-09 SDOH — ECONOMIC STABILITY: HOUSING INSECURITY
IN THE LAST 12 MONTHS, WAS THERE A TIME WHEN YOU DID NOT HAVE A STEADY PLACE TO SLEEP OR SLEPT IN A SHELTER (INCLUDING NOW)?: NO

## 2023-03-09 SDOH — ECONOMIC STABILITY: FOOD INSECURITY: WITHIN THE PAST 12 MONTHS, YOU WORRIED THAT YOUR FOOD WOULD RUN OUT BEFORE YOU GOT MONEY TO BUY MORE.: NEVER TRUE

## 2023-03-09 SDOH — ECONOMIC STABILITY: FOOD INSECURITY: WITHIN THE PAST 12 MONTHS, THE FOOD YOU BOUGHT JUST DIDN'T LAST AND YOU DIDN'T HAVE MONEY TO GET MORE.: NEVER TRUE

## 2023-03-09 ASSESSMENT — ENCOUNTER SYMPTOMS
RECTAL PAIN: 0
CHEST TIGHTNESS: 0
COUGH: 0
BACK PAIN: 1
COLOR CHANGE: 0
ABDOMINAL DISTENTION: 0
DIARRHEA: 0
CONSTIPATION: 0
SORE THROAT: 0
TROUBLE SWALLOWING: 0
WHEEZING: 0
ABDOMINAL PAIN: 0
ANAL BLEEDING: 0
VOMITING: 0
NAUSEA: 0
SHORTNESS OF BREATH: 0
BLOOD IN STOOL: 0

## 2023-03-09 ASSESSMENT — PATIENT HEALTH QUESTIONNAIRE - PHQ9: DEPRESSION UNABLE TO ASSESS: URGENT/EMERGENT SITUATION

## 2023-03-09 NOTE — PATIENT INSTRUCTIONS
You may receive a survey regarding the care you received during your visit.  Your input is valuable to us.  We encourage you to complete and return your survey.  We hope you will choose us in the future for your healthcare needs.     GENERAL OFFICE POLICIES      Telephone Calls: Messages will be answered within 1-2 business days, unless the provider is out of the office.  If it is urgent a covering provider will answer. (this does not include Medication refills).    MyChart:  We recommend all patients sign up for [employer].  Through this portal you can see your lab results, request refills, schedule appointments, pay your bill and send messages to the office.   [employer] messages will be answered within 1-2 business days unless the provider is out of the office.  For urgent matters, please call the office.  Appointments:  All appointments must be scheduled.  We ask all patients to schedule their next follow up appointment before they leave the office to make sure you will be able to be seen before you run out of medications.  24 hours notice is required to cancel or reschedule an appointment to avoid being marked as a no show.  You may be dismissed from the practice after 3 no shows.    LATE for Appointment: If you are 15 or more minutes late for your appointment, you may be asked to reschedule.  MA/LAB APPTS: Must be scheduled, cannot accept walk in lab visits.  We only draw labs for patients established in our office.  We only do injections for medications ordered by our office.  Acute Sick Visits:  Nothing other than acute complaint will be addressed at this visit.  TRADITIONAL MEDICARE  DOES NOT COVER PHYSICALS  MEDICARE WELLNESS VISITS: These are NOT physicals but the free annual visit offered by Medicare to discuss wellness issues. Medication refills, checkups, etc. will not be addressed during this visit.  Medication Refills: Refills are handled electronically so please contact your pharmacy for medication  refills even if current refills have been exhausted. If you are on a controlled medication you will be referred to a specialist (pain specialist, psychiatry, etc). Forms: There is a $35 fee to fill out FMLA/Disability paperwork, payable at time of . Instead of the fee, you can choose to have the paperwork filled out during a separate office visit that is for filling out the paperwork only. Medication Samples: This office does not carry medication samples. If you need assistance in getting your medications, then please let the medical assistant know so they can help you sign up for a drug assistance program that can help get medications at a reduced cost or even free (if you qualify). Workman's Comp Claims: We do not handle workman's comp cases or claims. You will need to go to an urgent care to be seen or to whomever your employer uses.   General - Any abusive/rude behavior toward staff/providers may be cause for dismissal.

## 2023-03-09 NOTE — PROGRESS NOTES
Danita Stoner (:  1975) is a 49 y.o. female,Established patient, here for evaluation of the following chief complaint(s):  Palpitations (HEART PALPITATIONS, HR RACING, SOB  ), Migraine (MIGRAINES ), and Back Pain (LOWER LEFT BACK PAIN, HURTS TO TAKE A DEEP BREATH )         ASSESSMENT/PLAN:  1. Heart palpitations  -Normal EKG in office today. -Given patient has had previous episodes with electrolyte abnormalities, this is what I am most suspicious for.  -She continues to take potassium supplementation daily.  -We will evaluate electrolytes, D-dimer, troponin, CMP, and we will also evaluate for diabetes given the polydipsia, polyuria, polyphagia that are new versus baseline.  -Follow-up based on results. -If all findings within normal limits, we will consider symptomatic treatment. If symptoms persist, we can consider Holter monitor.  -     EKG 12 Lead - Clinic Performed; Future  -     Comprehensive Metabolic Panel; Future  -     CBC with Auto Differential; Future  -     Hemoglobin A1C; Future  -     Troponin; Future  -     Magnesium; Future  -     D-Dimer, Quantitative; Future  2. Left flank pain  -Urine dipstick shows negative for all components, low specific gravity less than 1.005.  -Symptoms suggestive of nephrolithiasis versus pyelonephritis versus muscle strain versus diabetes insipidus.  -I am not very suspicious for diabetes insipidus and pyelonephritis. -Given the significant left CVA tenderness, I am suspicious of kidney stone. We will evaluate with CT scan.  -We will follow-up based on results and consider referral to urology.  -We will also assess the blood work listed below.  -Follow-up based on results. -     Comprehensive Metabolic Panel; Future  -     CBC with Auto Differential; Future  -     CRP,High Sensitivity; Future  -     Sedimentation Rate; Future  -     POCT Urinalysis no Micro  -     CT ABDOMEN PELVIS WO CONTRAST; Future  3. Essential hypertension  -Managing well.   No changes to current plan. Refill sent to pharmacy. Follow-up at annual visit. -     amLODIPine (NORVASC) 5 MG tablet; Take 1 tablet by mouth daily, Disp-90 tablet, R-3Normal  -     losartan (COZAAR) 100 MG tablet; Take 1 tablet by mouth daily, Disp-90 tablet, R-3Normal  4. Gastroesophageal reflux disease without esophagitis  -Managing well. No changes to current plan. Refill sent to pharmacy. Follow-up at annual visit. -     esomeprazole (NEXIUM) 40 MG delayed release capsule; Take 1 capsule by mouth every morning (before breakfast), Disp-90 capsule, R-3Normal  5. Hypokalemia  -     potassium chloride (KLOR-CON M10) 10 MEQ extended release tablet; Take 1 tablet by mouth daily, Disp-90 tablet, R-3Normal  6. Intractable chronic migraine without aura and without status migrainosus  -Managing well. No changes to current plan. Refill sent to pharmacy. Follow-up at annual visit. -     SUMAtriptan (IMITREX) 100 MG tablet; Take 1 tablet by mouth once as needed for Migraine, Disp-9 tablet, R-2Normal  7. OAB (overactive bladder)  -Managing well. No changes to current plan. Refill sent to pharmacy. Follow-up at annual visit. -     oxybutynin (DITROPAN XL) 10 MG extended release tablet; Take 1 tablet by mouth daily, Disp-90 tablet, R-3Normal  8. Seasonal allergic rhinitis, unspecified trigger  -Managing well. No changes to current plan. Refill sent to pharmacy. Follow-up at annual visit. -     cetirizine (ZYRTEC) 10 MG tablet; TAKE 1 TABLET BY MOUTH EVERY DAY, Disp-90 tablet, R-3Normal      Return if symptoms worsen or fail to improve. Subjective   SUBJECTIVE/OBJECTIVE:  HPI  Sara Finger presents today for palpitations with activity, left flank pain that is worse with deep breaths and is aching and throbing, not like a pulled muscle, increased urinary frequency, increase thirst, increased hunger, clear urine. Diarrhea, vomited Saturday, improved since Sunday.  Back pain/left flank pain only began yesterday, palpitations began Saturday. No medication changes, diet changes, or activity changes. She has been sleeping a lot more over the last week, feels like she cannot get enough sleep. Last night had poor quality sleep due to back pain. She has been taking ibuprofen 4x 200mg two time a day since yesterday, and notes that does not help. She reports an intolerance to physical activity beginning Saturday. She notes she went down a hill walking Saturday and felt diaphoresis, felt like face and legs went numb, along with palpitations. She does not have any current complaint of palpitations, but she has felt them earlier today. She has had low potassium in the past, this is what she is concerned about. She had a previous episode 4 months ago she had very similar symptoms lasting for a few days. She denies any chest pain or pressure, no leg swelling, no blood in the urine. She has a family history of diabetes, but has no personal history of diabetes. She also needs her medications refilled today. Review of Systems   Constitutional:  Positive for activity change, diaphoresis and fatigue. Negative for appetite change, chills, fever and unexpected weight change. HENT:  Negative for drooling, mouth sores, sore throat and trouble swallowing. Respiratory:  Negative for cough, chest tightness, shortness of breath and wheezing. Cardiovascular:  Positive for palpitations. Negative for chest pain and leg swelling. Gastrointestinal:  Negative for abdominal distention, abdominal pain, anal bleeding, blood in stool, constipation, diarrhea, nausea, rectal pain and vomiting. Endocrine: Positive for polydipsia, polyphagia and polyuria. Genitourinary:  Positive for flank pain (Left) and frequency. Negative for decreased urine volume, difficulty urinating, dysuria, hematuria and urgency. Musculoskeletal:  Positive for back pain. Negative for gait problem, myalgias and neck pain.    Skin:  Negative for color change, pallor and wound. Neurological:  Positive for numbness. Negative for dizziness, syncope, facial asymmetry, speech difficulty, weakness, light-headedness and headaches. Hematological:  Does not bruise/bleed easily. Psychiatric/Behavioral:  Negative for agitation, confusion, dysphoric mood and sleep disturbance. The patient is not nervous/anxious. Objective   Physical Exam  Vitals and nursing note reviewed. Constitutional:       General: She is not in acute distress. Appearance: Normal appearance. She is obese. She is not ill-appearing or diaphoretic. HENT:      Head: Normocephalic and atraumatic. Mouth/Throat:      Mouth: Mucous membranes are moist.      Pharynx: Oropharynx is clear. Eyes:      General: No scleral icterus. Extraocular Movements: Extraocular movements intact. Pupils: Pupils are equal, round, and reactive to light. Neck:      Vascular: No carotid bruit. Cardiovascular:      Rate and Rhythm: Normal rate and regular rhythm. Pulses: Normal pulses. Heart sounds: Normal heart sounds. No murmur heard. No friction rub. No gallop. Pulmonary:      Effort: Pulmonary effort is normal. No respiratory distress. Breath sounds: Normal breath sounds. No stridor. No wheezing, rhonchi or rales. Chest:      Chest wall: No tenderness. Abdominal:      General: Bowel sounds are normal. There is no distension. Palpations: Abdomen is soft. There is no mass. Tenderness: There is no abdominal tenderness. There is left CVA tenderness. There is no right CVA tenderness, guarding or rebound. Hernia: No hernia is present. Musculoskeletal:         General: No swelling, tenderness or signs of injury. Normal range of motion. Right shoulder: Normal strength. Left shoulder: Normal strength. Cervical back: Normal range of motion and neck supple. Right lower leg: No edema. Left lower leg: No edema.    Skin:     General: Skin is warm and dry. Capillary Refill: Capillary refill takes less than 2 seconds. Coloration: Skin is not jaundiced or pale. Findings: No bruising, erythema or rash. Neurological:      Mental Status: She is alert and oriented to person, place, and time. Motor: No weakness. Gait: Gait normal.   Psychiatric:         Mood and Affect: Mood normal.         Behavior: Behavior normal.         Thought Content: Thought content normal.         Judgment: Judgment normal.                An electronic signature was used to authenticate this note. --CATRACHO Clark - CNP       Please note that this chart was generated using dragon dictation software. Although every effort was made to ensure the accuracy of this automated transcription, some errors in transcription may have occurred.

## 2023-03-10 DIAGNOSIS — E87.6 HYPOKALEMIA: ICD-10-CM

## 2023-03-10 LAB
ESTIMATED AVERAGE GLUCOSE: 102.5 MG/DL
HBA1C MFR BLD: 5.2 %

## 2023-03-10 RX ORDER — PREDNISONE 10 MG/1
TABLET ORAL
Qty: 20 TABLET | Refills: 0 | Status: SHIPPED | OUTPATIENT
Start: 2023-03-10

## 2023-04-06 DIAGNOSIS — H69.91 UNSPECIFIED EUSTACHIAN TUBE DISORDER, RIGHT EAR: ICD-10-CM

## 2023-04-06 RX ORDER — FLUTICASONE PROPIONATE 50 MCG
SPRAY, SUSPENSION (ML) NASAL
Qty: 3 EACH | Refills: 1 | Status: SHIPPED | OUTPATIENT
Start: 2023-04-06

## 2023-06-20 ENCOUNTER — TELEPHONE (OUTPATIENT)
Dept: ADMINISTRATIVE | Age: 48
End: 2023-06-20

## 2023-11-06 ENCOUNTER — TELEPHONE (OUTPATIENT)
Dept: FAMILY MEDICINE CLINIC | Age: 48
End: 2023-11-06

## 2023-11-06 NOTE — TELEPHONE ENCOUNTER
----- Message from Melvin Zabala sent at 11/6/2023  4:46 PM EST -----  Subject: Message to Provider    QUESTIONS  Information for Provider? Patient has an appt on 12/4. She is having   surgery on 12/13 and wants to know if provider can do her preop physical   during that appt. Please call to let her know.   ---------------------------------------------------------------------------  --------------  Carlos Proctor INFO  [phone]; OK to leave message on voicemail  ---------------------------------------------------------------------------  --------------  SCRIPT ANSWERS  Relationship to Patient?  Self

## 2023-11-30 ENCOUNTER — APPOINTMENT (OUTPATIENT)
Dept: CT IMAGING | Age: 48
DRG: 045 | End: 2023-11-30
Payer: COMMERCIAL

## 2023-11-30 PROCEDURE — 36415 COLL VENOUS BLD VENIPUNCTURE: CPT

## 2023-11-30 PROCEDURE — 80053 COMPREHEN METABOLIC PANEL: CPT

## 2023-11-30 PROCEDURE — 85610 PROTHROMBIN TIME: CPT

## 2023-11-30 PROCEDURE — 85025 COMPLETE CBC W/AUTO DIFF WBC: CPT

## 2023-11-30 PROCEDURE — 99285 EMERGENCY DEPT VISIT HI MDM: CPT

## 2023-11-30 PROCEDURE — 84484 ASSAY OF TROPONIN QUANT: CPT

## 2023-11-30 PROCEDURE — 83735 ASSAY OF MAGNESIUM: CPT

## 2023-11-30 PROCEDURE — 93005 ELECTROCARDIOGRAM TRACING: CPT | Performed by: STUDENT IN AN ORGANIZED HEALTH CARE EDUCATION/TRAINING PROGRAM

## 2023-11-30 RX ORDER — DIPHENHYDRAMINE HYDROCHLORIDE 50 MG/ML
25 INJECTION INTRAMUSCULAR; INTRAVENOUS ONCE
Status: COMPLETED | OUTPATIENT
Start: 2023-12-01 | End: 2023-12-01

## 2023-11-30 RX ORDER — 0.9 % SODIUM CHLORIDE 0.9 %
1000 INTRAVENOUS SOLUTION INTRAVENOUS ONCE
Status: COMPLETED | OUTPATIENT
Start: 2023-12-01 | End: 2023-12-01

## 2023-11-30 RX ORDER — METOCLOPRAMIDE HYDROCHLORIDE 5 MG/ML
10 INJECTION INTRAMUSCULAR; INTRAVENOUS ONCE
Status: COMPLETED | OUTPATIENT
Start: 2023-12-01 | End: 2023-12-01

## 2023-11-30 ASSESSMENT — PAIN SCALES - GENERAL: PAINLEVEL_OUTOF10: 8

## 2023-11-30 ASSESSMENT — PAIN - FUNCTIONAL ASSESSMENT: PAIN_FUNCTIONAL_ASSESSMENT: 0-10

## 2023-12-01 ENCOUNTER — APPOINTMENT (OUTPATIENT)
Dept: CT IMAGING | Age: 48
DRG: 045 | End: 2023-12-01
Payer: COMMERCIAL

## 2023-12-01 ENCOUNTER — HOSPITAL ENCOUNTER (INPATIENT)
Age: 48
LOS: 5 days | Discharge: HOME | DRG: 045 | End: 2023-12-06
Attending: EMERGENCY MEDICINE | Admitting: INTERNAL MEDICINE
Payer: COMMERCIAL

## 2023-12-01 ENCOUNTER — APPOINTMENT (OUTPATIENT)
Dept: GENERAL RADIOLOGY | Age: 48
DRG: 045 | End: 2023-12-01
Payer: COMMERCIAL

## 2023-12-01 ENCOUNTER — APPOINTMENT (OUTPATIENT)
Dept: MRI IMAGING | Age: 48
DRG: 045 | End: 2023-12-01
Payer: COMMERCIAL

## 2023-12-01 DIAGNOSIS — I63.532 ACUTE LEFT PCA STROKE (HCC): Primary | ICD-10-CM

## 2023-12-01 DIAGNOSIS — H53.9 VISUAL DISTURBANCE: ICD-10-CM

## 2023-12-01 DIAGNOSIS — I10 ESSENTIAL HYPERTENSION: ICD-10-CM

## 2023-12-01 DIAGNOSIS — R51.9 ACUTE NONINTRACTABLE HEADACHE, UNSPECIFIED HEADACHE TYPE: ICD-10-CM

## 2023-12-01 PROBLEM — I63.9 ISCHEMIC STROKE (HCC): Status: ACTIVE | Noted: 2023-12-01

## 2023-12-01 LAB
ALBUMIN SERPL-MCNC: 3.8 G/DL (ref 3.4–5)
ALBUMIN SERPL-MCNC: 4.5 G/DL (ref 3.4–5)
ALBUMIN/GLOB SERPL: 1.3 {RATIO} (ref 1.1–2.2)
ALBUMIN/GLOB SERPL: 1.5 {RATIO} (ref 1.1–2.2)
ALP SERPL-CCNC: 81 U/L (ref 40–129)
ALP SERPL-CCNC: 91 U/L (ref 40–129)
ALT SERPL-CCNC: 14 U/L (ref 10–40)
ALT SERPL-CCNC: 16 U/L (ref 10–40)
ANION GAP SERPL CALCULATED.3IONS-SCNC: 12 MMOL/L (ref 3–16)
ANION GAP SERPL CALCULATED.3IONS-SCNC: 13 MMOL/L (ref 3–16)
AST SERPL-CCNC: 15 U/L (ref 15–37)
AST SERPL-CCNC: 16 U/L (ref 15–37)
BASOPHILS # BLD: 0.1 K/UL (ref 0–0.2)
BASOPHILS # BLD: 0.1 K/UL (ref 0–0.2)
BASOPHILS NFR BLD: 0.5 %
BASOPHILS NFR BLD: 0.8 %
BILIRUB SERPL-MCNC: 0.3 MG/DL (ref 0–1)
BILIRUB SERPL-MCNC: 0.4 MG/DL (ref 0–1)
BUN SERPL-MCNC: 11 MG/DL (ref 7–20)
BUN SERPL-MCNC: 9 MG/DL (ref 7–20)
CALCIUM SERPL-MCNC: 10 MG/DL (ref 8.3–10.6)
CALCIUM SERPL-MCNC: 9.3 MG/DL (ref 8.3–10.6)
CHLORIDE SERPL-SCNC: 100 MMOL/L (ref 99–110)
CHLORIDE SERPL-SCNC: 108 MMOL/L (ref 99–110)
CHOLEST SERPL-MCNC: 246 MG/DL (ref 0–199)
CO2 SERPL-SCNC: 23 MMOL/L (ref 21–32)
CO2 SERPL-SCNC: 25 MMOL/L (ref 21–32)
CREAT SERPL-MCNC: 0.9 MG/DL (ref 0.6–1.1)
CREAT SERPL-MCNC: 1 MG/DL (ref 0.6–1.1)
DEPRECATED RDW RBC AUTO: 14.9 % (ref 12.4–15.4)
DEPRECATED RDW RBC AUTO: 14.9 % (ref 12.4–15.4)
EKG ATRIAL RATE: 394 BPM
EKG DIAGNOSIS: NORMAL
EKG P AXIS: 84 DEGREES
EKG Q-T INTERVAL: 378 MS
EKG QRS DURATION: 94 MS
EKG QTC CALCULATION (BAZETT): 497 MS
EKG R AXIS: 88 DEGREES
EKG T AXIS: 57 DEGREES
EKG VENTRICULAR RATE: 104 BPM
EOSINOPHIL # BLD: 0.2 K/UL (ref 0–0.6)
EOSINOPHIL # BLD: 0.2 K/UL (ref 0–0.6)
EOSINOPHIL NFR BLD: 1.4 %
EOSINOPHIL NFR BLD: 1.7 %
EST. AVERAGE GLUCOSE BLD GHB EST-MCNC: 108.3 MG/DL
GFR SERPLBLD CREATININE-BSD FMLA CKD-EPI: >60 ML/MIN/{1.73_M2}
GFR SERPLBLD CREATININE-BSD FMLA CKD-EPI: >60 ML/MIN/{1.73_M2}
GLUCOSE BLD-MCNC: 119 MG/DL (ref 70–99)
GLUCOSE SERPL-MCNC: 116 MG/DL (ref 70–99)
GLUCOSE SERPL-MCNC: 97 MG/DL (ref 70–99)
HBA1C MFR BLD: 5.4 %
HCT VFR BLD AUTO: 36 % (ref 36–48)
HCT VFR BLD AUTO: 38.6 % (ref 36–48)
HDLC SERPL-MCNC: 37 MG/DL (ref 40–60)
HGB BLD-MCNC: 12.3 G/DL (ref 12–16)
HGB BLD-MCNC: 13.2 G/DL (ref 12–16)
INR PPP: 0.89 (ref 0.84–1.16)
LDLC SERPL CALC-MCNC: ABNORMAL MG/DL
LDLC SERPL-MCNC: 148 MG/DL
LYMPHOCYTES # BLD: 3 K/UL (ref 1–5.1)
LYMPHOCYTES # BLD: 3.2 K/UL (ref 1–5.1)
LYMPHOCYTES NFR BLD: 24.7 %
LYMPHOCYTES NFR BLD: 27.3 %
MAGNESIUM SERPL-MCNC: 2.1 MG/DL (ref 1.8–2.4)
MCH RBC QN AUTO: 30.9 PG (ref 26–34)
MCH RBC QN AUTO: 31.1 PG (ref 26–34)
MCHC RBC AUTO-ENTMCNC: 34.1 G/DL (ref 31–36)
MCHC RBC AUTO-ENTMCNC: 34.3 G/DL (ref 31–36)
MCV RBC AUTO: 90.2 FL (ref 80–100)
MCV RBC AUTO: 91.2 FL (ref 80–100)
MONOCYTES # BLD: 0.7 K/UL (ref 0–1.3)
MONOCYTES # BLD: 0.9 K/UL (ref 0–1.3)
MONOCYTES NFR BLD: 6.4 %
MONOCYTES NFR BLD: 6.9 %
NEUTROPHILS # BLD: 7 K/UL (ref 1.7–7.7)
NEUTROPHILS # BLD: 8.7 K/UL (ref 1.7–7.7)
NEUTROPHILS NFR BLD: 63.8 %
NEUTROPHILS NFR BLD: 66.5 %
PERFORMED ON: ABNORMAL
PLATELET # BLD AUTO: 281 K/UL (ref 135–450)
PLATELET # BLD AUTO: 320 K/UL (ref 135–450)
PMV BLD AUTO: 7.6 FL (ref 5–10.5)
PMV BLD AUTO: 8 FL (ref 5–10.5)
POTASSIUM SERPL-SCNC: 3.3 MMOL/L (ref 3.5–5.1)
POTASSIUM SERPL-SCNC: 3.6 MMOL/L (ref 3.5–5.1)
PROT SERPL-MCNC: 6.4 G/DL (ref 6.4–8.2)
PROT SERPL-MCNC: 8 G/DL (ref 6.4–8.2)
PROTHROMBIN TIME: 12.1 SEC (ref 11.5–14.8)
RBC # BLD AUTO: 3.99 M/UL (ref 4–5.2)
RBC # BLD AUTO: 4.23 M/UL (ref 4–5.2)
SODIUM SERPL-SCNC: 138 MMOL/L (ref 136–145)
SODIUM SERPL-SCNC: 143 MMOL/L (ref 136–145)
TRIGL SERPL-MCNC: 313 MG/DL (ref 0–150)
TROPONIN, HIGH SENSITIVITY: 7 NG/L (ref 0–14)
TROPONIN, HIGH SENSITIVITY: 7 NG/L (ref 0–14)
VLDLC SERPL CALC-MCNC: ABNORMAL MG/DL
WBC # BLD AUTO: 11 K/UL (ref 4–11)
WBC # BLD AUTO: 13 K/UL (ref 4–11)

## 2023-12-01 PROCEDURE — 6360000002 HC RX W HCPCS: Performed by: STUDENT IN AN ORGANIZED HEALTH CARE EDUCATION/TRAINING PROGRAM

## 2023-12-01 PROCEDURE — 6370000000 HC RX 637 (ALT 250 FOR IP): Performed by: STUDENT IN AN ORGANIZED HEALTH CARE EDUCATION/TRAINING PROGRAM

## 2023-12-01 PROCEDURE — 2580000003 HC RX 258: Performed by: EMERGENCY MEDICINE

## 2023-12-01 PROCEDURE — 83036 HEMOGLOBIN GLYCOSYLATED A1C: CPT

## 2023-12-01 PROCEDURE — 85025 COMPLETE CBC W/AUTO DIFF WBC: CPT

## 2023-12-01 PROCEDURE — 80061 LIPID PANEL: CPT

## 2023-12-01 PROCEDURE — 80053 COMPREHEN METABOLIC PANEL: CPT

## 2023-12-01 PROCEDURE — 6360000004 HC RX CONTRAST MEDICATION: Performed by: EMERGENCY MEDICINE

## 2023-12-01 PROCEDURE — 99223 1ST HOSP IP/OBS HIGH 75: CPT | Performed by: PSYCHIATRY & NEUROLOGY

## 2023-12-01 PROCEDURE — 93005 ELECTROCARDIOGRAM TRACING: CPT | Performed by: STUDENT IN AN ORGANIZED HEALTH CARE EDUCATION/TRAINING PROGRAM

## 2023-12-01 PROCEDURE — 93010 ELECTROCARDIOGRAM REPORT: CPT | Performed by: INTERNAL MEDICINE

## 2023-12-01 PROCEDURE — 84484 ASSAY OF TROPONIN QUANT: CPT

## 2023-12-01 PROCEDURE — 6370000000 HC RX 637 (ALT 250 FOR IP): Performed by: INTERNAL MEDICINE

## 2023-12-01 PROCEDURE — APPNB30 APP NON BILLABLE TIME 0-30 MINS

## 2023-12-01 PROCEDURE — 70551 MRI BRAIN STEM W/O DYE: CPT

## 2023-12-01 PROCEDURE — 70450 CT HEAD/BRAIN W/O DYE: CPT

## 2023-12-01 PROCEDURE — 70498 CT ANGIOGRAPHY NECK: CPT

## 2023-12-01 PROCEDURE — 2060000000 HC ICU INTERMEDIATE R&B

## 2023-12-01 PROCEDURE — 6360000002 HC RX W HCPCS: Performed by: EMERGENCY MEDICINE

## 2023-12-01 PROCEDURE — 71045 X-RAY EXAM CHEST 1 VIEW: CPT

## 2023-12-01 RX ORDER — POTASSIUM CHLORIDE 7.45 MG/ML
10 INJECTION INTRAVENOUS PRN
Status: DISCONTINUED | OUTPATIENT
Start: 2023-12-01 | End: 2023-12-06 | Stop reason: HOSPADM

## 2023-12-01 RX ORDER — FLUTICASONE PROPIONATE 50 MCG
2 SPRAY, SUSPENSION (ML) NASAL DAILY
Status: DISCONTINUED | OUTPATIENT
Start: 2023-12-01 | End: 2023-12-06 | Stop reason: HOSPADM

## 2023-12-01 RX ORDER — AMLODIPINE BESYLATE 5 MG/1
5 TABLET ORAL DAILY
Status: DISCONTINUED | OUTPATIENT
Start: 2023-12-01 | End: 2023-12-01

## 2023-12-01 RX ORDER — ASPIRIN 81 MG/1
81 TABLET ORAL DAILY
Status: DISCONTINUED | OUTPATIENT
Start: 2023-12-01 | End: 2023-12-06 | Stop reason: HOSPADM

## 2023-12-01 RX ORDER — TRAMADOL HYDROCHLORIDE 50 MG/1
50 TABLET ORAL EVERY 6 HOURS PRN
Status: DISCONTINUED | OUTPATIENT
Start: 2023-12-01 | End: 2023-12-06 | Stop reason: HOSPADM

## 2023-12-01 RX ORDER — LOSARTAN POTASSIUM 100 MG/1
100 TABLET ORAL DAILY
Status: DISCONTINUED | OUTPATIENT
Start: 2023-12-01 | End: 2023-12-02

## 2023-12-01 RX ORDER — ATORVASTATIN CALCIUM 80 MG/1
80 TABLET, FILM COATED ORAL NIGHTLY
Status: DISCONTINUED | OUTPATIENT
Start: 2023-12-01 | End: 2023-12-01

## 2023-12-01 RX ORDER — ASPIRIN 300 MG/1
300 SUPPOSITORY RECTAL DAILY
Status: DISCONTINUED | OUTPATIENT
Start: 2023-12-01 | End: 2023-12-01

## 2023-12-01 RX ORDER — ATORVASTATIN CALCIUM 80 MG/1
80 TABLET, FILM COATED ORAL NIGHTLY
Status: DISCONTINUED | OUTPATIENT
Start: 2023-12-01 | End: 2023-12-06 | Stop reason: HOSPADM

## 2023-12-01 RX ORDER — ASPIRIN 81 MG/1
81 TABLET ORAL DAILY
COMMUNITY

## 2023-12-01 RX ORDER — MAGNESIUM SULFATE IN WATER 40 MG/ML
2000 INJECTION, SOLUTION INTRAVENOUS PRN
Status: DISCONTINUED | OUTPATIENT
Start: 2023-12-01 | End: 2023-12-06 | Stop reason: HOSPADM

## 2023-12-01 RX ORDER — ONDANSETRON 2 MG/ML
4 INJECTION INTRAMUSCULAR; INTRAVENOUS EVERY 6 HOURS PRN
Status: DISCONTINUED | OUTPATIENT
Start: 2023-12-01 | End: 2023-12-06 | Stop reason: HOSPADM

## 2023-12-01 RX ORDER — LABETALOL HYDROCHLORIDE 5 MG/ML
10 INJECTION, SOLUTION INTRAVENOUS EVERY 10 MIN PRN
Status: DISCONTINUED | OUTPATIENT
Start: 2023-12-01 | End: 2023-12-06 | Stop reason: HOSPADM

## 2023-12-01 RX ORDER — AMLODIPINE BESYLATE 5 MG/1
10 TABLET ORAL DAILY
Status: DISCONTINUED | OUTPATIENT
Start: 2023-12-01 | End: 2023-12-06 | Stop reason: HOSPADM

## 2023-12-01 RX ORDER — CLOPIDOGREL BISULFATE 75 MG/1
75 TABLET ORAL DAILY
COMMUNITY

## 2023-12-01 RX ORDER — NICOTINE 21 MG/24HR
1 PATCH, TRANSDERMAL 24 HOURS TRANSDERMAL DAILY
Status: DISCONTINUED | OUTPATIENT
Start: 2023-12-01 | End: 2023-12-06 | Stop reason: HOSPADM

## 2023-12-01 RX ORDER — ACETAMINOPHEN 325 MG/1
650 TABLET ORAL EVERY 6 HOURS PRN
COMMUNITY

## 2023-12-01 RX ORDER — POTASSIUM CHLORIDE 20 MEQ/1
10 TABLET, EXTENDED RELEASE ORAL DAILY
Status: DISCONTINUED | OUTPATIENT
Start: 2023-12-01 | End: 2023-12-06 | Stop reason: HOSPADM

## 2023-12-01 RX ORDER — CLOPIDOGREL BISULFATE 75 MG/1
75 TABLET ORAL DAILY
Status: DISCONTINUED | OUTPATIENT
Start: 2023-12-01 | End: 2023-12-06 | Stop reason: HOSPADM

## 2023-12-01 RX ORDER — OXYBUTYNIN CHLORIDE 10 MG/1
10 TABLET, EXTENDED RELEASE ORAL DAILY
Status: DISCONTINUED | OUTPATIENT
Start: 2023-12-01 | End: 2023-12-01

## 2023-12-01 RX ORDER — ACETAMINOPHEN 325 MG/1
650 TABLET ORAL EVERY 6 HOURS PRN
Status: DISCONTINUED | OUTPATIENT
Start: 2023-12-01 | End: 2023-12-06 | Stop reason: HOSPADM

## 2023-12-01 RX ORDER — PROMETHAZINE HYDROCHLORIDE 25 MG/1
12.5 TABLET ORAL EVERY 6 HOURS PRN
Status: DISCONTINUED | OUTPATIENT
Start: 2023-12-01 | End: 2023-12-06 | Stop reason: HOSPADM

## 2023-12-01 RX ORDER — ATORVASTATIN CALCIUM 80 MG/1
80 TABLET, FILM COATED ORAL NIGHTLY
COMMUNITY

## 2023-12-01 RX ORDER — ASPIRIN 81 MG/1
81 TABLET, CHEWABLE ORAL DAILY
Status: DISCONTINUED | OUTPATIENT
Start: 2023-12-01 | End: 2023-12-01

## 2023-12-01 RX ORDER — SUMATRIPTAN 25 MG/1
100 TABLET, FILM COATED ORAL
Status: DISPENSED | OUTPATIENT
Start: 2023-12-01 | End: 2023-12-02

## 2023-12-01 RX ORDER — ACETAMINOPHEN 325 MG/1
650 TABLET ORAL EVERY 6 HOURS PRN
Status: DISCONTINUED | OUTPATIENT
Start: 2023-12-01 | End: 2023-12-04 | Stop reason: SDUPTHER

## 2023-12-01 RX ORDER — LANOLIN ALCOHOL/MO/W.PET/CERES
3 CREAM (GRAM) TOPICAL NIGHTLY
Status: DISCONTINUED | OUTPATIENT
Start: 2023-12-01 | End: 2023-12-06 | Stop reason: HOSPADM

## 2023-12-01 RX ORDER — ACETAMINOPHEN 650 MG/1
650 SUPPOSITORY RECTAL EVERY 6 HOURS PRN
Status: DISCONTINUED | OUTPATIENT
Start: 2023-12-01 | End: 2023-12-06 | Stop reason: HOSPADM

## 2023-12-01 RX ORDER — POLYETHYLENE GLYCOL 3350 17 G/17G
17 POWDER, FOR SOLUTION ORAL DAILY PRN
Status: DISCONTINUED | OUTPATIENT
Start: 2023-12-01 | End: 2023-12-06 | Stop reason: HOSPADM

## 2023-12-01 RX ADMIN — POTASSIUM CHLORIDE 10 MEQ: 1500 TABLET, EXTENDED RELEASE ORAL at 14:05

## 2023-12-01 RX ADMIN — TRAMADOL HYDROCHLORIDE 50 MG: 50 TABLET ORAL at 14:05

## 2023-12-01 RX ADMIN — SODIUM CHLORIDE 1000 ML: 9 INJECTION, SOLUTION INTRAVENOUS at 01:10

## 2023-12-01 RX ADMIN — ACETAMINOPHEN 650 MG: 325 TABLET ORAL at 16:31

## 2023-12-01 RX ADMIN — DIPHENHYDRAMINE HYDROCHLORIDE 25 MG: 50 INJECTION INTRAMUSCULAR; INTRAVENOUS at 01:13

## 2023-12-01 RX ADMIN — ASPIRIN 81 MG: 81 TABLET, CHEWABLE ORAL at 09:30

## 2023-12-01 RX ADMIN — LOSARTAN POTASSIUM 100 MG: 100 TABLET, FILM COATED ORAL at 14:05

## 2023-12-01 RX ADMIN — LABETALOL HYDROCHLORIDE 10 MG: 5 INJECTION INTRAVENOUS at 16:33

## 2023-12-01 RX ADMIN — ACETAMINOPHEN 650 MG: 325 TABLET ORAL at 09:30

## 2023-12-01 RX ADMIN — METOCLOPRAMIDE 10 MG: 5 INJECTION, SOLUTION INTRAMUSCULAR; INTRAVENOUS at 01:13

## 2023-12-01 RX ADMIN — TRAMADOL HYDROCHLORIDE 50 MG: 50 TABLET ORAL at 22:07

## 2023-12-01 RX ADMIN — AMLODIPINE BESYLATE 10 MG: 5 TABLET ORAL at 14:05

## 2023-12-01 RX ADMIN — IOPAMIDOL 75 ML: 755 INJECTION, SOLUTION INTRAVENOUS at 00:15

## 2023-12-01 RX ADMIN — CLOPIDOGREL BISULFATE 75 MG: 75 TABLET ORAL at 16:31

## 2023-12-01 RX ADMIN — ATORVASTATIN CALCIUM 80 MG: 80 TABLET, FILM COATED ORAL at 22:07

## 2023-12-01 ASSESSMENT — PAIN DESCRIPTION - PAIN TYPE
TYPE: ACUTE PAIN

## 2023-12-01 ASSESSMENT — PAIN DESCRIPTION - DESCRIPTORS
DESCRIPTORS: ACHING
DESCRIPTORS: ACHING
DESCRIPTORS: ACHING;THROBBING

## 2023-12-01 ASSESSMENT — PAIN DESCRIPTION - LOCATION
LOCATION: HEAD

## 2023-12-01 ASSESSMENT — PAIN SCALES - GENERAL
PAINLEVEL_OUTOF10: 5
PAINLEVEL_OUTOF10: 3
PAINLEVEL_OUTOF10: 6
PAINLEVEL_OUTOF10: 0
PAINLEVEL_OUTOF10: 8
PAINLEVEL_OUTOF10: 8
PAINLEVEL_OUTOF10: 5
PAINLEVEL_OUTOF10: 4
PAINLEVEL_OUTOF10: 7

## 2023-12-01 ASSESSMENT — PAIN - FUNCTIONAL ASSESSMENT: PAIN_FUNCTIONAL_ASSESSMENT: ACTIVITIES ARE NOT PREVENTED

## 2023-12-01 ASSESSMENT — PAIN DESCRIPTION - ORIENTATION: ORIENTATION: RIGHT;LEFT;ANTERIOR

## 2023-12-01 NOTE — H&P
HOSPITALISTS HISTORY AND PHYSICAL    12/1/2023 2:23 PM    Patient Information:  Cristal Turner is a 51 y.o. female [id number]  PCP:  CATRACHO Mabry CNP (Tel: [phone] )    Chief complaint:    Chief Complaint   Patient presents with    Headache    Chest Pain    Eye Problem     Pt from home c/o R eye blurriness, chest pain, and migraine. Pt states she was seen Tuesday in [place] and was told she had a stroke. Pt states stayed overnight in hospital but left the next morning. History of Present Illness:   51 M was in [place] for vacation when she developed sudden headache left headache and  floaters with visual disturbance, went to hospital and was found to have acute stroke. Unclear if TPA was given but  appears not given. Pt became anxious  being far away and drove  here  and came to ER. The  headache  and visual problem  is  persistent, no  n/v.         REVIEW OF SYSTEMS:   All 10 systems reviewed and  are negative except as mentioned in HPI    Past Medical History:   has a past medical history of Anxiety, DVT (deep vein thrombosis) in pregnancy, Hypertension, and Low blood potassium. Past Surgical History:   has a past surgical history that includes Dilation and curettage of uterus; Ovary removal; Tubal ligation; Hysterectomy (5/28/15); and Abdominal exploration surgery (8/12/15). Medications:  No current facility-administered medications on file prior to encounter.      Current Outpatient Medications on File Prior to Encounter   Medication Sig Dispense Refill    clopidogrel (PLAVIX) 75 MG tablet Take 1 tablet by mouth daily      atorvastatin (LIPITOR) 80 MG tablet Take 1 tablet by mouth at bedtime      aspirin 81 MG EC tablet Take 1 tablet by mouth daily      acetaminophen (TYLENOL) 325 MG tablet Take 2 tablets by mouth every 6 hours as needed for Pain      fluticasone

## 2023-12-01 NOTE — ED PROVIDER NOTES
[facility]) Emergency [address]    Alejandra Dance, MD, am the primary clinician of record. CHIEF COMPLAINT  Chief Complaint   Patient presents with    Headache    Chest Pain    Eye Problem     Pt from home c/o R eye blurriness, chest pain, and migraine. Pt states she was seen Tuesday in [place] and was told she had a stroke. Pt states stayed overnight in hospital but left the next morning. HISTORY OF PRESENT ILLNESS  Jim Gardner is a 51 y.o. female  who presents to the ED complaining of presenting here from [place], she is from this area but was on a trip down there and while in [place] developed headaches and right-sided visual disturbances. She was admitted to the hospital and on November 28 was diagnosed with a left PCA stroke, ischemic, and was following up with neurology. During that admission she also had a negative CTA for LVO. Apparently due to some difficulty getting IV access sufficient to get a bubble study and her hypercoagulable work-up/stroke evaluation, she became frustrated and left the hospital and she and her significant other drove back here to [place] where she comes immediately to the hospital with her same persistent symptoms, including headache and right-sided visual disturbances. She denies any numbness or weakness in the arms or legs. She denies any trouble with speech or swallowing. She denies any head trauma. She was given \"blood thinners\" at the other hospital but is unsure what that means. She does not know if she got tPA or tenecteplase. She also does not know the results of some of her other studies that were performed. I can only see similar results from that hospitalization at this time. She does also complain of some left-sided chest discomfort. No other complaints, modifying factors or associated symptoms. I have reviewed the following from the nursing documentation.     Past Medical History:   Diagnosis Date    Anxiety     DVT

## 2023-12-01 NOTE — ED NOTES
ED TO INPATIENT SBAR HANDOFF    Patient Name: Daphne Sparks   :  1975  51 y.o. MRN:  [id number]  Preferred Name    ED Room #:  ED-0005/05  Family/Caregiver Present no   Restraints no   Sitter no   Sepsis Risk Score Sepsis Risk Score: 3.33    Situation  Code Status: Prior . Allergies: Bee venom, Hydrochlorothiazide, and Amoxicillin  Weight: Patient Vitals for the past 96 hrs (Last 3 readings):   Weight   23 2337 98 kg (216 lb)     Arrived from: home  Chief Complaint:   Chief Complaint   Patient presents with    Headache    Chest Pain    Eye Problem     Pt from home c/o R eye blurriness, chest pain, and migraine. Pt states she was seen Tuesday in [place] and was told she had a stroke. Pt states stayed overnight in hospital but left the next morning. Hospital Problem/Diagnosis:  Principal Problem:    Ischemic stroke [facility])  Resolved Problems:    * No resolved hospital problems. *    Imaging:   CTA HEAD NECK W CONTRAST   Final Result   Severe short segment stenosis or near complete occlusion at left PCA P1-P2   junction. No flow-limiting large vessel stenosis in the neck. CT HEAD WO CONTRAST   Final Result   No acute intracranial abnormality. The findings were sent to the Radiology Results [facility] at 12:19   am on 2023 to be communicated to a licensed caregiver.          XR CHEST PORTABLE    (Results Pending)     Abnormal labs:   Abnormal Labs Reviewed   CBC WITH AUTO DIFFERENTIAL - Abnormal; Notable for the following components:       Result Value    WBC 13.0 (*)     Neutrophils Absolute 8.7 (*)     All other components within normal limits   COMPREHENSIVE METABOLIC PANEL W/ REFLEX TO MG FOR LOW K - Abnormal; Notable for the following components:    Potassium reflex Magnesium 3.3 (*)     Glucose 116 (*)     All other components within normal limits   POCT GLUCOSE - Abnormal; Notable for the following components:    POC Glucose 119 (*)     All other components

## 2023-12-02 ENCOUNTER — APPOINTMENT (OUTPATIENT)
Dept: CT IMAGING | Age: 48
DRG: 045 | End: 2023-12-02
Payer: COMMERCIAL

## 2023-12-02 PROBLEM — I20.89 ANGINA AT REST: Status: ACTIVE | Noted: 2023-12-02

## 2023-12-02 LAB
ALBUMIN SERPL-MCNC: 4.1 G/DL (ref 3.4–5)
ALBUMIN/GLOB SERPL: 1.3 {RATIO} (ref 1.1–2.2)
ALP SERPL-CCNC: 80 U/L (ref 40–129)
ALT SERPL-CCNC: 17 U/L (ref 10–40)
ANION GAP SERPL CALCULATED.3IONS-SCNC: 11 MMOL/L (ref 3–16)
AST SERPL-CCNC: 17 U/L (ref 15–37)
BASOPHILS # BLD: 0.1 K/UL (ref 0–0.2)
BASOPHILS # BLD: 0.1 K/UL (ref 0–0.2)
BASOPHILS NFR BLD: 0.7 %
BASOPHILS NFR BLD: 0.7 %
BILIRUB SERPL-MCNC: 0.4 MG/DL (ref 0–1)
BUN SERPL-MCNC: 13 MG/DL (ref 7–20)
CALCIUM SERPL-MCNC: 9.9 MG/DL (ref 8.3–10.6)
CHLORIDE SERPL-SCNC: 104 MMOL/L (ref 99–110)
CO2 SERPL-SCNC: 23 MMOL/L (ref 21–32)
CREAT SERPL-MCNC: 0.8 MG/DL (ref 0.6–1.1)
DEPRECATED RDW RBC AUTO: 14.7 % (ref 12.4–15.4)
DEPRECATED RDW RBC AUTO: 14.8 % (ref 12.4–15.4)
EKG ATRIAL RATE: 77 BPM
EKG DIAGNOSIS: NORMAL
EKG P AXIS: 63 DEGREES
EKG P-R INTERVAL: 132 MS
EKG Q-T INTERVAL: 422 MS
EKG QRS DURATION: 84 MS
EKG QTC CALCULATION (BAZETT): 477 MS
EKG R AXIS: 71 DEGREES
EKG T AXIS: 18 DEGREES
EKG VENTRICULAR RATE: 77 BPM
EOSINOPHIL # BLD: 0.2 K/UL (ref 0–0.6)
EOSINOPHIL # BLD: 0.2 K/UL (ref 0–0.6)
EOSINOPHIL NFR BLD: 1.6 %
EOSINOPHIL NFR BLD: 1.9 %
GFR SERPLBLD CREATININE-BSD FMLA CKD-EPI: >60 ML/MIN/{1.73_M2}
GLUCOSE SERPL-MCNC: 94 MG/DL (ref 70–99)
HCG SERPL QL: NEGATIVE
HCT VFR BLD AUTO: 35.9 % (ref 36–48)
HCT VFR BLD AUTO: 36.8 % (ref 36–48)
HGB BLD-MCNC: 12.2 G/DL (ref 12–16)
HGB BLD-MCNC: 12.6 G/DL (ref 12–16)
INR PPP: 0.92 (ref 0.84–1.16)
LYMPHOCYTES # BLD: 2.3 K/UL (ref 1–5.1)
LYMPHOCYTES # BLD: 2.4 K/UL (ref 1–5.1)
LYMPHOCYTES NFR BLD: 21.9 %
LYMPHOCYTES NFR BLD: 22.4 %
MCH RBC QN AUTO: 30.6 PG (ref 26–34)
MCH RBC QN AUTO: 30.7 PG (ref 26–34)
MCHC RBC AUTO-ENTMCNC: 34.1 G/DL (ref 31–36)
MCHC RBC AUTO-ENTMCNC: 34.1 G/DL (ref 31–36)
MCV RBC AUTO: 89.7 FL (ref 80–100)
MCV RBC AUTO: 90.1 FL (ref 80–100)
MONOCYTES # BLD: 0.7 K/UL (ref 0–1.3)
MONOCYTES # BLD: 0.8 K/UL (ref 0–1.3)
MONOCYTES NFR BLD: 6.6 %
MONOCYTES NFR BLD: 7.5 %
NEUTROPHILS # BLD: 7.1 K/UL (ref 1.7–7.7)
NEUTROPHILS # BLD: 7.3 K/UL (ref 1.7–7.7)
NEUTROPHILS NFR BLD: 68 %
NEUTROPHILS NFR BLD: 68.7 %
PLATELET # BLD AUTO: 266 K/UL (ref 135–450)
PLATELET # BLD AUTO: 274 K/UL (ref 135–450)
PMV BLD AUTO: 7.7 FL (ref 5–10.5)
PMV BLD AUTO: 7.7 FL (ref 5–10.5)
POTASSIUM SERPL-SCNC: 3.8 MMOL/L (ref 3.5–5.1)
PROT SERPL-MCNC: 7.2 G/DL (ref 6.4–8.2)
PROTHROMBIN TIME: 12.3 SEC (ref 11.5–14.8)
RBC # BLD AUTO: 4 M/UL (ref 4–5.2)
RBC # BLD AUTO: 4.09 M/UL (ref 4–5.2)
SODIUM SERPL-SCNC: 138 MMOL/L (ref 136–145)
TROPONIN, HIGH SENSITIVITY: 7 NG/L (ref 0–14)
TROPONIN, HIGH SENSITIVITY: 7 NG/L (ref 0–14)
TROPONIN, HIGH SENSITIVITY: <6 NG/L (ref 0–14)
WBC # BLD AUTO: 10.4 K/UL (ref 4–11)
WBC # BLD AUTO: 10.6 K/UL (ref 4–11)

## 2023-12-02 PROCEDURE — 99223 1ST HOSP IP/OBS HIGH 75: CPT | Performed by: INTERNAL MEDICINE

## 2023-12-02 PROCEDURE — 6370000000 HC RX 637 (ALT 250 FOR IP): Performed by: INTERNAL MEDICINE

## 2023-12-02 PROCEDURE — 80053 COMPREHEN METABOLIC PANEL: CPT

## 2023-12-02 PROCEDURE — 99232 SBSQ HOSP IP/OBS MODERATE 35: CPT | Performed by: PSYCHIATRY & NEUROLOGY

## 2023-12-02 PROCEDURE — 84484 ASSAY OF TROPONIN QUANT: CPT

## 2023-12-02 PROCEDURE — 36415 COLL VENOUS BLD VENIPUNCTURE: CPT

## 2023-12-02 PROCEDURE — C9113 INJ PANTOPRAZOLE SODIUM, VIA: HCPCS | Performed by: STUDENT IN AN ORGANIZED HEALTH CARE EDUCATION/TRAINING PROGRAM

## 2023-12-02 PROCEDURE — 6370000000 HC RX 637 (ALT 250 FOR IP): Performed by: NURSE PRACTITIONER

## 2023-12-02 PROCEDURE — 6360000004 HC RX CONTRAST MEDICATION: Performed by: STUDENT IN AN ORGANIZED HEALTH CARE EDUCATION/TRAINING PROGRAM

## 2023-12-02 PROCEDURE — 2060000000 HC ICU INTERMEDIATE R&B

## 2023-12-02 PROCEDURE — 6360000002 HC RX W HCPCS: Performed by: INTERNAL MEDICINE

## 2023-12-02 PROCEDURE — 84703 CHORIONIC GONADOTROPIN ASSAY: CPT

## 2023-12-02 PROCEDURE — 70498 CT ANGIOGRAPHY NECK: CPT

## 2023-12-02 PROCEDURE — 6370000000 HC RX 637 (ALT 250 FOR IP): Performed by: STUDENT IN AN ORGANIZED HEALTH CARE EDUCATION/TRAINING PROGRAM

## 2023-12-02 PROCEDURE — 93005 ELECTROCARDIOGRAM TRACING: CPT | Performed by: STUDENT IN AN ORGANIZED HEALTH CARE EDUCATION/TRAINING PROGRAM

## 2023-12-02 PROCEDURE — 93010 ELECTROCARDIOGRAM REPORT: CPT | Performed by: INTERNAL MEDICINE

## 2023-12-02 PROCEDURE — 6360000002 HC RX W HCPCS: Performed by: STUDENT IN AN ORGANIZED HEALTH CARE EDUCATION/TRAINING PROGRAM

## 2023-12-02 PROCEDURE — 71260 CT THORAX DX C+: CPT

## 2023-12-02 PROCEDURE — 85610 PROTHROMBIN TIME: CPT

## 2023-12-02 PROCEDURE — 85025 COMPLETE CBC W/AUTO DIFF WBC: CPT

## 2023-12-02 RX ORDER — PANTOPRAZOLE SODIUM 40 MG/10ML
40 INJECTION, POWDER, LYOPHILIZED, FOR SOLUTION INTRAVENOUS DAILY
Status: DISCONTINUED | OUTPATIENT
Start: 2023-12-02 | End: 2023-12-06 | Stop reason: HOSPADM

## 2023-12-02 RX ORDER — MORPHINE SULFATE 2 MG/ML
2 INJECTION, SOLUTION INTRAMUSCULAR; INTRAVENOUS
Status: DISCONTINUED | OUTPATIENT
Start: 2023-12-02 | End: 2023-12-06 | Stop reason: HOSPADM

## 2023-12-02 RX ORDER — CALCIUM CARBONATE 500 MG/1
500 TABLET, CHEWABLE ORAL 3 TIMES DAILY PRN
Status: DISCONTINUED | OUTPATIENT
Start: 2023-12-02 | End: 2023-12-06 | Stop reason: HOSPADM

## 2023-12-02 RX ORDER — REGADENOSON 0.08 MG/ML
0.4 INJECTION, SOLUTION INTRAVENOUS
OUTPATIENT
Start: 2023-12-02

## 2023-12-02 RX ORDER — NITROGLYCERIN 0.4 MG/1
0.4 TABLET SUBLINGUAL EVERY 5 MIN PRN
Status: COMPLETED | OUTPATIENT
Start: 2023-12-02 | End: 2023-12-02

## 2023-12-02 RX ORDER — CARVEDILOL 6.25 MG/1
6.25 TABLET ORAL 2 TIMES DAILY WITH MEALS
Status: DISCONTINUED | OUTPATIENT
Start: 2023-12-02 | End: 2023-12-02

## 2023-12-02 RX ORDER — VALSARTAN 40 MG/1
160 TABLET ORAL DAILY
Status: DISCONTINUED | OUTPATIENT
Start: 2023-12-03 | End: 2023-12-06 | Stop reason: HOSPADM

## 2023-12-02 RX ADMIN — POTASSIUM CHLORIDE 10 MEQ: 1500 TABLET, EXTENDED RELEASE ORAL at 08:35

## 2023-12-02 RX ADMIN — ONDANSETRON 4 MG: 2 INJECTION INTRAMUSCULAR; INTRAVENOUS at 16:25

## 2023-12-02 RX ADMIN — NITROGLYCERIN 0.4 MG: 0.4 TABLET, ORALLY DISINTEGRATING SUBLINGUAL at 00:14

## 2023-12-02 RX ADMIN — TRAMADOL HYDROCHLORIDE 50 MG: 50 TABLET ORAL at 04:10

## 2023-12-02 RX ADMIN — MELATONIN TAB 3 MG 3 MG: 3 TAB at 20:55

## 2023-12-02 RX ADMIN — PANTOPRAZOLE SODIUM 40 MG: 40 INJECTION, POWDER, FOR SOLUTION INTRAVENOUS at 12:35

## 2023-12-02 RX ADMIN — MORPHINE SULFATE 2 MG: 2 INJECTION, SOLUTION INTRAMUSCULAR; INTRAVENOUS at 09:35

## 2023-12-02 RX ADMIN — ACETAMINOPHEN 650 MG: 325 TABLET ORAL at 14:00

## 2023-12-02 RX ADMIN — ACETAMINOPHEN 650 MG: 325 TABLET ORAL at 20:56

## 2023-12-02 RX ADMIN — AMLODIPINE BESYLATE 10 MG: 5 TABLET ORAL at 11:10

## 2023-12-02 RX ADMIN — ATORVASTATIN CALCIUM 80 MG: 80 TABLET, FILM COATED ORAL at 20:55

## 2023-12-02 RX ADMIN — TRAMADOL HYDROCHLORIDE 50 MG: 50 TABLET ORAL at 12:35

## 2023-12-02 RX ADMIN — LOSARTAN POTASSIUM 100 MG: 100 TABLET, FILM COATED ORAL at 11:10

## 2023-12-02 RX ADMIN — NITROGLYCERIN 0.5 INCH: 20 OINTMENT TOPICAL at 08:35

## 2023-12-02 RX ADMIN — ASPIRIN 81 MG: 81 TABLET, COATED ORAL at 11:10

## 2023-12-02 RX ADMIN — TRAMADOL HYDROCHLORIDE 50 MG: 50 TABLET ORAL at 19:45

## 2023-12-02 RX ADMIN — IOPAMIDOL 140 ML: 755 INJECTION, SOLUTION INTRAVENOUS at 08:57

## 2023-12-02 RX ADMIN — CLOPIDOGREL BISULFATE 75 MG: 75 TABLET ORAL at 17:05

## 2023-12-02 RX ADMIN — MORPHINE SULFATE 2 MG: 2 INJECTION, SOLUTION INTRAMUSCULAR; INTRAVENOUS at 22:32

## 2023-12-02 RX ADMIN — ANTACID TABLETS 500 MG: 500 TABLET, CHEWABLE ORAL at 12:35

## 2023-12-02 RX ADMIN — NITROGLYCERIN 0.4 MG: 0.4 TABLET, ORALLY DISINTEGRATING SUBLINGUAL at 09:25

## 2023-12-02 RX ADMIN — ONDANSETRON 4 MG: 2 INJECTION INTRAMUSCULAR; INTRAVENOUS at 07:40

## 2023-12-02 RX ADMIN — NITROGLYCERIN 0.4 MG: 0.4 TABLET, ORALLY DISINTEGRATING SUBLINGUAL at 00:29

## 2023-12-02 ASSESSMENT — PAIN SCALES - GENERAL
PAINLEVEL_OUTOF10: 6
PAINLEVEL_OUTOF10: 5
PAINLEVEL_OUTOF10: 4
PAINLEVEL_OUTOF10: 5
PAINLEVEL_OUTOF10: 4
PAINLEVEL_OUTOF10: 5
PAINLEVEL_OUTOF10: 5
PAINLEVEL_OUTOF10: 7
PAINLEVEL_OUTOF10: 4
PAINLEVEL_OUTOF10: 3
PAINLEVEL_OUTOF10: 0
PAINLEVEL_OUTOF10: 7
PAINLEVEL_OUTOF10: 5
PAINLEVEL_OUTOF10: 6
PAINLEVEL_OUTOF10: 4

## 2023-12-02 ASSESSMENT — PAIN DESCRIPTION - DESCRIPTORS
DESCRIPTORS: ACHING;THROBBING
DESCRIPTORS: TIGHTNESS;SQUEEZING

## 2023-12-02 ASSESSMENT — PAIN DESCRIPTION - LOCATION
LOCATION: HEAD
LOCATION: CHEST
LOCATION: CHEST
LOCATION: HEAD
LOCATION_2: NECK
LOCATION: HEAD;EYE
LOCATION: HEAD
LOCATION: CHEST

## 2023-12-02 ASSESSMENT — PAIN DESCRIPTION - ORIENTATION
ORIENTATION_2: RIGHT
ORIENTATION: RIGHT;LEFT
ORIENTATION: LEFT
ORIENTATION: RIGHT
ORIENTATION: LEFT

## 2023-12-02 ASSESSMENT — PAIN DESCRIPTION - FREQUENCY
FREQUENCY: INTERMITTENT

## 2023-12-02 ASSESSMENT — PAIN DESCRIPTION - PAIN TYPE
TYPE: ACUTE PAIN

## 2023-12-02 ASSESSMENT — PAIN - FUNCTIONAL ASSESSMENT: PAIN_FUNCTIONAL_ASSESSMENT: ACTIVITIES ARE NOT PREVENTED

## 2023-12-02 NOTE — PLAN OF CARE
Problem: Pain  Goal: Verbalizes/displays adequate comfort level or baseline comfort level  Outcome: Progressing  Note: Patient with c/o intermittent chest pain and headache throughout shift. PRN medications administered per orders per patient request - see MAR. Will continue to monitor. Problem: Cardiovascular - Adult  Goal: Maintains optimal cardiac output and hemodynamic stability  Outcome: Progressing  Note: Patient BP elevated, other VSS at this time on room air. Scheduled medications administered per orders - see MAR. Will continue to monitor. Problem: Gastrointestinal - Adult  Goal: Minimal or absence of nausea and vomiting  Outcome: Progressing  Note: Patient with c/o intermittent nausea and emesis through shift. PRN Zofran administered per orders - see MAR. Will continue to monitor.

## 2023-12-03 LAB
EKG ATRIAL RATE: 67 BPM
EKG ATRIAL RATE: 79 BPM
EKG ATRIAL RATE: 85 BPM
EKG DIAGNOSIS: NORMAL
EKG P AXIS: 53 DEGREES
EKG P AXIS: 63 DEGREES
EKG P AXIS: 73 DEGREES
EKG P-R INTERVAL: 134 MS
EKG P-R INTERVAL: 138 MS
EKG P-R INTERVAL: 144 MS
EKG Q-T INTERVAL: 414 MS
EKG Q-T INTERVAL: 424 MS
EKG Q-T INTERVAL: 450 MS
EKG QRS DURATION: 78 MS
EKG QRS DURATION: 84 MS
EKG QRS DURATION: 86 MS
EKG QTC CALCULATION (BAZETT): 475 MS
EKG QTC CALCULATION (BAZETT): 486 MS
EKG QTC CALCULATION (BAZETT): 492 MS
EKG R AXIS: 53 DEGREES
EKG R AXIS: 61 DEGREES
EKG R AXIS: 65 DEGREES
EKG T AXIS: 31 DEGREES
EKG T AXIS: 39 DEGREES
EKG T AXIS: 63 DEGREES
EKG VENTRICULAR RATE: 67 BPM
EKG VENTRICULAR RATE: 79 BPM
EKG VENTRICULAR RATE: 85 BPM
TROPONIN, HIGH SENSITIVITY: 7 NG/L (ref 0–14)
TROPONIN, HIGH SENSITIVITY: 7 NG/L (ref 0–14)
TROPONIN, HIGH SENSITIVITY: 8 NG/L (ref 0–14)

## 2023-12-03 PROCEDURE — 6370000000 HC RX 637 (ALT 250 FOR IP): Performed by: INTERNAL MEDICINE

## 2023-12-03 PROCEDURE — 92610 EVALUATE SWALLOWING FUNCTION: CPT

## 2023-12-03 PROCEDURE — 97116 GAIT TRAINING THERAPY: CPT

## 2023-12-03 PROCEDURE — C9113 INJ PANTOPRAZOLE SODIUM, VIA: HCPCS | Performed by: STUDENT IN AN ORGANIZED HEALTH CARE EDUCATION/TRAINING PROGRAM

## 2023-12-03 PROCEDURE — 97165 OT EVAL LOW COMPLEX 30 MIN: CPT

## 2023-12-03 PROCEDURE — 2060000000 HC ICU INTERMEDIATE R&B

## 2023-12-03 PROCEDURE — 6370000000 HC RX 637 (ALT 250 FOR IP): Performed by: STUDENT IN AN ORGANIZED HEALTH CARE EDUCATION/TRAINING PROGRAM

## 2023-12-03 PROCEDURE — 93010 ELECTROCARDIOGRAM REPORT: CPT | Performed by: INTERNAL MEDICINE

## 2023-12-03 PROCEDURE — 36415 COLL VENOUS BLD VENIPUNCTURE: CPT

## 2023-12-03 PROCEDURE — 97530 THERAPEUTIC ACTIVITIES: CPT

## 2023-12-03 PROCEDURE — 6360000002 HC RX W HCPCS: Performed by: STUDENT IN AN ORGANIZED HEALTH CARE EDUCATION/TRAINING PROGRAM

## 2023-12-03 PROCEDURE — 97161 PT EVAL LOW COMPLEX 20 MIN: CPT

## 2023-12-03 PROCEDURE — 84484 ASSAY OF TROPONIN QUANT: CPT

## 2023-12-03 PROCEDURE — 99233 SBSQ HOSP IP/OBS HIGH 50: CPT | Performed by: INTERNAL MEDICINE

## 2023-12-03 RX ADMIN — AMLODIPINE BESYLATE 10 MG: 5 TABLET ORAL at 08:07

## 2023-12-03 RX ADMIN — BREXPIPRAZOLE 0.25 MG: 0.5 TABLET ORAL at 08:59

## 2023-12-03 RX ADMIN — TRAMADOL HYDROCHLORIDE 50 MG: 50 TABLET ORAL at 18:55

## 2023-12-03 RX ADMIN — PANTOPRAZOLE SODIUM 40 MG: 40 INJECTION, POWDER, FOR SOLUTION INTRAVENOUS at 08:08

## 2023-12-03 RX ADMIN — ASPIRIN 81 MG: 81 TABLET, COATED ORAL at 08:08

## 2023-12-03 RX ADMIN — POTASSIUM CHLORIDE 10 MEQ: 1500 TABLET, EXTENDED RELEASE ORAL at 08:08

## 2023-12-03 RX ADMIN — VALSARTAN 160 MG: 40 TABLET, FILM COATED ORAL at 08:08

## 2023-12-03 RX ADMIN — BREXPIPRAZOLE 0.25 MG: 0.5 TABLET ORAL at 20:54

## 2023-12-03 RX ADMIN — CLOPIDOGREL BISULFATE 75 MG: 75 TABLET ORAL at 16:34

## 2023-12-03 RX ADMIN — MORPHINE SULFATE 2 MG: 2 INJECTION, SOLUTION INTRAMUSCULAR; INTRAVENOUS at 20:27

## 2023-12-03 RX ADMIN — ATORVASTATIN CALCIUM 80 MG: 80 TABLET, FILM COATED ORAL at 20:54

## 2023-12-03 RX ADMIN — ACETAMINOPHEN 650 MG: 325 TABLET ORAL at 08:07

## 2023-12-03 ASSESSMENT — PAIN DESCRIPTION - DESCRIPTORS
DESCRIPTORS: ACHING
DESCRIPTORS: TIGHTNESS

## 2023-12-03 ASSESSMENT — PAIN DESCRIPTION - LOCATION
LOCATION: HEAD
LOCATION: HEAD
LOCATION: CHEST

## 2023-12-03 ASSESSMENT — PAIN DESCRIPTION - ORIENTATION
ORIENTATION: MID
ORIENTATION: LEFT

## 2023-12-03 ASSESSMENT — PAIN SCALES - GENERAL
PAINLEVEL_OUTOF10: 6
PAINLEVEL_OUTOF10: 4
PAINLEVEL_OUTOF10: 8

## 2023-12-03 NOTE — PLAN OF CARE
Problem: Discharge Planning  Goal: Discharge to home or other facility with appropriate resources  Outcome: Progressing     Problem: Pain  Goal: Verbalizes/displays adequate comfort level or baseline comfort level  Outcome: Progressing     Problem: ABCDS Injury Assessment  Goal: Absence of physical injury  Outcome: Progressing     Problem: Neurosensory - Adult  Goal: Achieves stable or improved neurological status  Outcome: Progressing     Problem: Respiratory - Adult  Goal: Achieves optimal ventilation and oxygenation  Outcome: Progressing     Problem: Cardiovascular - Adult  Goal: Maintains optimal cardiac output and hemodynamic stability  Outcome: Progressing    Goal: Absence of cardiac dysrhythmias or at baseline  Outcome: Progressing     Problem: Skin/Tissue Integrity - Adult  Goal: Skin integrity remains intact  Outcome: Progressing     Problem: Gastrointestinal - Adult  Goal: Minimal or absence of nausea and vomiting  Outcome: Progressing    Goal: Maintains or returns to baseline bowel function  Outcome: Progressing    Goal: Maintains adequate nutritional intake  Outcome: Progressing     Problem: Genitourinary - Adult  Goal: Absence of urinary retention  Outcome: Progressing

## 2023-12-04 ENCOUNTER — TELEPHONE (OUTPATIENT)
Dept: CARDIOLOGY CLINIC | Age: 48
End: 2023-12-04

## 2023-12-04 DIAGNOSIS — R07.9 CHEST PAIN, UNSPECIFIED TYPE: Primary | ICD-10-CM

## 2023-12-04 LAB
ANION GAP SERPL CALCULATED.3IONS-SCNC: 10 MMOL/L (ref 3–16)
BUN SERPL-MCNC: 19 MG/DL (ref 7–20)
CALCIUM SERPL-MCNC: 10.5 MG/DL (ref 8.3–10.6)
CHLORIDE SERPL-SCNC: 104 MMOL/L (ref 99–110)
CO2 SERPL-SCNC: 25 MMOL/L (ref 21–32)
CREAT SERPL-MCNC: 1 MG/DL (ref 0.6–1.1)
GFR SERPLBLD CREATININE-BSD FMLA CKD-EPI: >60 ML/MIN/{1.73_M2}
GLUCOSE SERPL-MCNC: 97 MG/DL (ref 70–99)
MAGNESIUM SERPL-MCNC: 2 MG/DL (ref 1.8–2.4)
POTASSIUM SERPL-SCNC: 4.2 MMOL/L (ref 3.5–5.1)
SODIUM SERPL-SCNC: 139 MMOL/L (ref 136–145)

## 2023-12-04 PROCEDURE — 83735 ASSAY OF MAGNESIUM: CPT

## 2023-12-04 PROCEDURE — 2060000000 HC ICU INTERMEDIATE R&B

## 2023-12-04 PROCEDURE — 99233 SBSQ HOSP IP/OBS HIGH 50: CPT | Performed by: NURSE PRACTITIONER

## 2023-12-04 PROCEDURE — 6370000000 HC RX 637 (ALT 250 FOR IP): Performed by: INTERNAL MEDICINE

## 2023-12-04 PROCEDURE — 1200000000 HC SEMI PRIVATE

## 2023-12-04 PROCEDURE — 80048 BASIC METABOLIC PNL TOTAL CA: CPT

## 2023-12-04 PROCEDURE — 92526 ORAL FUNCTION THERAPY: CPT

## 2023-12-04 PROCEDURE — 6370000000 HC RX 637 (ALT 250 FOR IP): Performed by: NURSE PRACTITIONER

## 2023-12-04 PROCEDURE — 93308 TTE F-UP OR LMTD: CPT

## 2023-12-04 PROCEDURE — 36415 COLL VENOUS BLD VENIPUNCTURE: CPT

## 2023-12-04 PROCEDURE — 6360000002 HC RX W HCPCS: Performed by: STUDENT IN AN ORGANIZED HEALTH CARE EDUCATION/TRAINING PROGRAM

## 2023-12-04 PROCEDURE — C9113 INJ PANTOPRAZOLE SODIUM, VIA: HCPCS | Performed by: STUDENT IN AN ORGANIZED HEALTH CARE EDUCATION/TRAINING PROGRAM

## 2023-12-04 PROCEDURE — 6370000000 HC RX 637 (ALT 250 FOR IP): Performed by: STUDENT IN AN ORGANIZED HEALTH CARE EDUCATION/TRAINING PROGRAM

## 2023-12-04 RX ORDER — METOPROLOL SUCCINATE 25 MG/1
25 TABLET, EXTENDED RELEASE ORAL DAILY
Status: DISCONTINUED | OUTPATIENT
Start: 2023-12-04 | End: 2023-12-06

## 2023-12-04 RX ADMIN — CLOPIDOGREL BISULFATE 75 MG: 75 TABLET ORAL at 16:06

## 2023-12-04 RX ADMIN — BREXPIPRAZOLE 0.25 MG: 0.5 TABLET ORAL at 21:17

## 2023-12-04 RX ADMIN — ASPIRIN 81 MG: 81 TABLET, COATED ORAL at 08:53

## 2023-12-04 RX ADMIN — MORPHINE SULFATE 2 MG: 2 INJECTION, SOLUTION INTRAMUSCULAR; INTRAVENOUS at 16:09

## 2023-12-04 RX ADMIN — AMLODIPINE BESYLATE 10 MG: 5 TABLET ORAL at 08:52

## 2023-12-04 RX ADMIN — POTASSIUM CHLORIDE 10 MEQ: 1500 TABLET, EXTENDED RELEASE ORAL at 08:52

## 2023-12-04 RX ADMIN — ATORVASTATIN CALCIUM 80 MG: 80 TABLET, FILM COATED ORAL at 21:17

## 2023-12-04 RX ADMIN — PANTOPRAZOLE SODIUM 40 MG: 40 INJECTION, POWDER, FOR SOLUTION INTRAVENOUS at 08:52

## 2023-12-04 RX ADMIN — BREXPIPRAZOLE 0.25 MG: 0.5 TABLET ORAL at 10:08

## 2023-12-04 RX ADMIN — VALSARTAN 160 MG: 40 TABLET, FILM COATED ORAL at 08:53

## 2023-12-04 RX ADMIN — METOPROLOL SUCCINATE 25 MG: 25 TABLET, EXTENDED RELEASE ORAL at 10:30

## 2023-12-04 ASSESSMENT — PAIN DESCRIPTION - LOCATION
LOCATION: HEAD
LOCATION: HEAD

## 2023-12-04 ASSESSMENT — PAIN SCALES - GENERAL
PAINLEVEL_OUTOF10: 3
PAINLEVEL_OUTOF10: 8

## 2023-12-04 ASSESSMENT — PAIN DESCRIPTION - DESCRIPTORS
DESCRIPTORS: ACHING
DESCRIPTORS: ACHING

## 2023-12-04 NOTE — CARE COORDINATION
Discharge Planning:     (CM) reviewed the patient's chart to assess needs. Patient's Readmission Risk Score is 9%. Patient's medical insurance is [employer]. Patient's PCP is Geovani Winn. No needs anticipated, at this time. CM team to follow. Staff to inform CM if additional discharge needs arise. PT/OT saw Patient and have no current recommendations.          Electronically signed by BHASKAR Birmingham on 12/4/2023 at 3:45 PM

## 2023-12-04 NOTE — TELEPHONE ENCOUNTER
Please schedule pt for stress test with OV same day with a Cardiologist as New pt (preferred if available) or with NP. Test to be first available anytime 12/13/23 or after.

## 2023-12-04 NOTE — TELEPHONE ENCOUNTER
Pt currently admitted will call to provide testing number and schedule new patient appt once released   Please advise   Thank you

## 2023-12-05 DIAGNOSIS — I48.91 ATRIAL FIBRILLATION, UNSPECIFIED TYPE (HCC): Primary | ICD-10-CM

## 2023-12-05 PROBLEM — F33.1 MAJOR DEPRESSIVE DISORDER, RECURRENT, MODERATE (HCC): Status: ACTIVE | Noted: 2023-12-05

## 2023-12-05 PROCEDURE — C9113 INJ PANTOPRAZOLE SODIUM, VIA: HCPCS | Performed by: STUDENT IN AN ORGANIZED HEALTH CARE EDUCATION/TRAINING PROGRAM

## 2023-12-05 PROCEDURE — 6360000002 HC RX W HCPCS: Performed by: PSYCHIATRY & NEUROLOGY

## 2023-12-05 PROCEDURE — 99233 SBSQ HOSP IP/OBS HIGH 50: CPT | Performed by: INTERNAL MEDICINE

## 2023-12-05 PROCEDURE — 6360000002 HC RX W HCPCS: Performed by: INTERNAL MEDICINE

## 2023-12-05 PROCEDURE — 2500000003 HC RX 250 WO HCPCS: Performed by: INTERNAL MEDICINE

## 2023-12-05 PROCEDURE — 6360000002 HC RX W HCPCS: Performed by: STUDENT IN AN ORGANIZED HEALTH CARE EDUCATION/TRAINING PROGRAM

## 2023-12-05 PROCEDURE — 1200000000 HC SEMI PRIVATE

## 2023-12-05 PROCEDURE — 6370000000 HC RX 637 (ALT 250 FOR IP): Performed by: STUDENT IN AN ORGANIZED HEALTH CARE EDUCATION/TRAINING PROGRAM

## 2023-12-05 PROCEDURE — 99253 IP/OBS CNSLTJ NEW/EST LOW 45: CPT | Performed by: PSYCHIATRY & NEUROLOGY

## 2023-12-05 PROCEDURE — 2580000003 HC RX 258: Performed by: INTERNAL MEDICINE

## 2023-12-05 PROCEDURE — 6370000000 HC RX 637 (ALT 250 FOR IP): Performed by: INTERNAL MEDICINE

## 2023-12-05 PROCEDURE — 6370000000 HC RX 637 (ALT 250 FOR IP): Performed by: PSYCHIATRY & NEUROLOGY

## 2023-12-05 PROCEDURE — 6370000000 HC RX 637 (ALT 250 FOR IP): Performed by: NURSE PRACTITIONER

## 2023-12-05 RX ORDER — METOPROLOL TARTRATE 50 MG/1
50 TABLET, FILM COATED ORAL ONCE
Status: COMPLETED | OUTPATIENT
Start: 2023-12-05 | End: 2023-12-05

## 2023-12-05 RX ORDER — LORAZEPAM 2 MG/ML
1 INJECTION INTRAMUSCULAR ONCE
Status: COMPLETED | OUTPATIENT
Start: 2023-12-05 | End: 2023-12-05

## 2023-12-05 RX ORDER — DESVENLAFAXINE SUCCINATE 50 MG/1
50 TABLET, EXTENDED RELEASE ORAL DAILY
Status: DISCONTINUED | OUTPATIENT
Start: 2023-12-05 | End: 2023-12-06 | Stop reason: HOSPADM

## 2023-12-05 RX ORDER — LORAZEPAM 2 MG/ML
0.5 INJECTION INTRAMUSCULAR EVERY 6 HOURS PRN
Status: DISCONTINUED | OUTPATIENT
Start: 2023-12-05 | End: 2023-12-06 | Stop reason: HOSPADM

## 2023-12-05 RX ORDER — METOPROLOL TARTRATE 1 MG/ML
5 INJECTION, SOLUTION INTRAVENOUS EVERY 5 MIN PRN
Status: DISCONTINUED | OUTPATIENT
Start: 2023-12-05 | End: 2023-12-06

## 2023-12-05 RX ORDER — 0.9 % SODIUM CHLORIDE 0.9 %
1000 INTRAVENOUS SOLUTION INTRAVENOUS ONCE
Status: COMPLETED | OUTPATIENT
Start: 2023-12-05 | End: 2023-12-05

## 2023-12-05 RX ORDER — NITROGLYCERIN 0.4 MG/1
0.4 TABLET SUBLINGUAL EVERY 5 MIN PRN
Status: DISCONTINUED | OUTPATIENT
Start: 2023-12-05 | End: 2023-12-06 | Stop reason: HOSPADM

## 2023-12-05 RX ORDER — ENOXAPARIN SODIUM 100 MG/ML
40 INJECTION SUBCUTANEOUS DAILY
Status: DISCONTINUED | OUTPATIENT
Start: 2023-12-06 | End: 2023-12-06 | Stop reason: HOSPADM

## 2023-12-05 RX ADMIN — CLOPIDOGREL BISULFATE 75 MG: 75 TABLET ORAL at 17:05

## 2023-12-05 RX ADMIN — METOPROLOL SUCCINATE 25 MG: 25 TABLET, EXTENDED RELEASE ORAL at 08:51

## 2023-12-05 RX ADMIN — POTASSIUM CHLORIDE 10 MEQ: 1500 TABLET, EXTENDED RELEASE ORAL at 08:49

## 2023-12-05 RX ADMIN — SODIUM CHLORIDE 1000 ML: 9 INJECTION, SOLUTION INTRAVENOUS at 13:59

## 2023-12-05 RX ADMIN — MORPHINE SULFATE 2 MG: 2 INJECTION, SOLUTION INTRAMUSCULAR; INTRAVENOUS at 00:04

## 2023-12-05 RX ADMIN — METOPROLOL TARTRATE 50 MG: 50 TABLET ORAL at 10:34

## 2023-12-05 RX ADMIN — DESVENLAFAXINE SUCCINATE 50 MG: 50 TABLET, EXTENDED RELEASE ORAL at 17:20

## 2023-12-05 RX ADMIN — PANTOPRAZOLE SODIUM 40 MG: 40 INJECTION, POWDER, FOR SOLUTION INTRAVENOUS at 10:37

## 2023-12-05 RX ADMIN — BREXPIPRAZOLE 0.25 MG: 0.5 TABLET ORAL at 21:03

## 2023-12-05 RX ADMIN — BREXPIPRAZOLE 0.25 MG: 0.5 TABLET ORAL at 17:20

## 2023-12-05 RX ADMIN — LORAZEPAM 1 MG: 2 INJECTION INTRAMUSCULAR; INTRAVENOUS at 13:59

## 2023-12-05 RX ADMIN — DILTIAZEM HYDROCHLORIDE 5 MG/HR: 5 INJECTION, SOLUTION INTRAVENOUS at 10:36

## 2023-12-05 RX ADMIN — ATORVASTATIN CALCIUM 80 MG: 80 TABLET, FILM COATED ORAL at 21:04

## 2023-12-05 RX ADMIN — LORAZEPAM 0.5 MG: 2 INJECTION INTRAMUSCULAR; INTRAVENOUS at 21:03

## 2023-12-05 ASSESSMENT — PAIN DESCRIPTION - FREQUENCY: FREQUENCY: CONTINUOUS

## 2023-12-05 ASSESSMENT — PAIN DESCRIPTION - PAIN TYPE: TYPE: CHRONIC PAIN

## 2023-12-05 ASSESSMENT — PAIN DESCRIPTION - LOCATION: LOCATION: NECK

## 2023-12-05 ASSESSMENT — PAIN DESCRIPTION - ORIENTATION: ORIENTATION: POSTERIOR

## 2023-12-05 ASSESSMENT — PAIN DESCRIPTION - DESCRIPTORS: DESCRIPTORS: PATIENT UNABLE TO DESCRIBE

## 2023-12-05 ASSESSMENT — PAIN SCALES - GENERAL: PAINLEVEL_OUTOF10: 2

## 2023-12-05 NOTE — PLAN OF CARE
Given VT on monitor, chest pain, and risk factors, will get a coronary CTA on patient. Although stress test is contraindicated in setting of acute CVA, there is no contraindication to coronary CTA in this setting. NPO until CT is done and reviewed.

## 2023-12-06 VITALS
TEMPERATURE: 97.1 F | OXYGEN SATURATION: 100 % | SYSTOLIC BLOOD PRESSURE: 127 MMHG | DIASTOLIC BLOOD PRESSURE: 73 MMHG | RESPIRATION RATE: 16 BRPM | BODY MASS INDEX: 30.75 KG/M2 | HEIGHT: 69 IN | HEART RATE: 66 BPM | WEIGHT: 207.6 LBS

## 2023-12-06 DIAGNOSIS — R00.2 PALPITATIONS: Primary | ICD-10-CM

## 2023-12-06 PROBLEM — I47.20 VT (VENTRICULAR TACHYCARDIA) (HCC): Status: ACTIVE | Noted: 2023-12-06

## 2023-12-06 LAB
25(OH)D3 SERPL-MCNC: 43.4 NG/ML
EKG ATRIAL RATE: 85 BPM
EKG DIAGNOSIS: NORMAL
EKG P AXIS: 72 DEGREES
EKG P-R INTERVAL: 134 MS
EKG Q-T INTERVAL: 410 MS
EKG QRS DURATION: 88 MS
EKG QTC CALCULATION (BAZETT): 487 MS
EKG R AXIS: 55 DEGREES
EKG T AXIS: 80 DEGREES
EKG VENTRICULAR RATE: 85 BPM
FOLATE SERPL-MCNC: 10.11 NG/ML (ref 4.78–24.2)
TSH SERPL DL<=0.005 MIU/L-ACNC: 1.67 UIU/ML (ref 0.27–4.2)
VIT B12 SERPL-MCNC: 377 PG/ML (ref 211–911)

## 2023-12-06 PROCEDURE — 99233 SBSQ HOSP IP/OBS HIGH 50: CPT | Performed by: INTERNAL MEDICINE

## 2023-12-06 PROCEDURE — 6370000000 HC RX 637 (ALT 250 FOR IP): Performed by: INTERNAL MEDICINE

## 2023-12-06 PROCEDURE — 36415 COLL VENOUS BLD VENIPUNCTURE: CPT

## 2023-12-06 PROCEDURE — C9113 INJ PANTOPRAZOLE SODIUM, VIA: HCPCS | Performed by: STUDENT IN AN ORGANIZED HEALTH CARE EDUCATION/TRAINING PROGRAM

## 2023-12-06 PROCEDURE — 99231 SBSQ HOSP IP/OBS SF/LOW 25: CPT | Performed by: PSYCHIATRY & NEUROLOGY

## 2023-12-06 PROCEDURE — 2500000003 HC RX 250 WO HCPCS: Performed by: INTERNAL MEDICINE

## 2023-12-06 PROCEDURE — 6360000002 HC RX W HCPCS: Performed by: INTERNAL MEDICINE

## 2023-12-06 PROCEDURE — 6370000000 HC RX 637 (ALT 250 FOR IP): Performed by: STUDENT IN AN ORGANIZED HEALTH CARE EDUCATION/TRAINING PROGRAM

## 2023-12-06 PROCEDURE — 82746 ASSAY OF FOLIC ACID SERUM: CPT

## 2023-12-06 PROCEDURE — 6360000002 HC RX W HCPCS: Performed by: PSYCHIATRY & NEUROLOGY

## 2023-12-06 PROCEDURE — 93010 ELECTROCARDIOGRAM REPORT: CPT | Performed by: INTERNAL MEDICINE

## 2023-12-06 PROCEDURE — 93005 ELECTROCARDIOGRAM TRACING: CPT | Performed by: INTERNAL MEDICINE

## 2023-12-06 PROCEDURE — 6370000000 HC RX 637 (ALT 250 FOR IP): Performed by: PSYCHIATRY & NEUROLOGY

## 2023-12-06 PROCEDURE — 82607 VITAMIN B-12: CPT

## 2023-12-06 PROCEDURE — 82306 VITAMIN D 25 HYDROXY: CPT

## 2023-12-06 PROCEDURE — 6360000002 HC RX W HCPCS: Performed by: STUDENT IN AN ORGANIZED HEALTH CARE EDUCATION/TRAINING PROGRAM

## 2023-12-06 PROCEDURE — 84443 ASSAY THYROID STIM HORMONE: CPT

## 2023-12-06 RX ORDER — METOPROLOL SUCCINATE 50 MG/1
50 TABLET, EXTENDED RELEASE ORAL DAILY
Status: DISCONTINUED | OUTPATIENT
Start: 2023-12-07 | End: 2023-12-06 | Stop reason: HOSPADM

## 2023-12-06 RX ORDER — NICOTINE 21 MG/24HR
1 PATCH, TRANSDERMAL 24 HOURS TRANSDERMAL DAILY
Qty: 7 PATCH | Refills: 0 | Status: SHIPPED | OUTPATIENT
Start: 2023-12-07 | End: 2023-12-14

## 2023-12-06 RX ORDER — DILTIAZEM HCL IN NACL,ISO-OSM 125 MG/125
2.5-15 PLASTIC BAG, INJECTION (ML) INTRAVENOUS CONTINUOUS
Status: DISCONTINUED | OUTPATIENT
Start: 2023-12-06 | End: 2023-12-06

## 2023-12-06 RX ORDER — VALSARTAN 160 MG/1
160 TABLET ORAL DAILY
Qty: 30 TABLET | Refills: 1 | Status: SHIPPED | OUTPATIENT
Start: 2023-12-07

## 2023-12-06 RX ORDER — DESVENLAFAXINE SUCCINATE 50 MG/1
50 TABLET, EXTENDED RELEASE ORAL DAILY
Qty: 30 TABLET | Refills: 0 | Status: SHIPPED | OUTPATIENT
Start: 2023-12-07

## 2023-12-06 RX ORDER — AMLODIPINE BESYLATE 10 MG/1
10 TABLET ORAL DAILY
Qty: 30 TABLET | Refills: 1 | Status: SHIPPED | OUTPATIENT
Start: 2023-12-07

## 2023-12-06 RX ORDER — METOPROLOL SUCCINATE 50 MG/1
50 TABLET, EXTENDED RELEASE ORAL ONCE
Status: COMPLETED | OUTPATIENT
Start: 2023-12-06 | End: 2023-12-06

## 2023-12-06 RX ORDER — METOPROLOL SUCCINATE 50 MG/1
50 TABLET, EXTENDED RELEASE ORAL DAILY
Qty: 30 TABLET | Refills: 1 | Status: SHIPPED | OUTPATIENT
Start: 2023-12-07

## 2023-12-06 RX ADMIN — AMLODIPINE BESYLATE 10 MG: 5 TABLET ORAL at 09:27

## 2023-12-06 RX ADMIN — DESVENLAFAXINE SUCCINATE 50 MG: 50 TABLET, EXTENDED RELEASE ORAL at 09:40

## 2023-12-06 RX ADMIN — ASPIRIN 81 MG: 81 TABLET, COATED ORAL at 09:27

## 2023-12-06 RX ADMIN — BREXPIPRAZOLE 0.25 MG: 0.5 TABLET ORAL at 12:01

## 2023-12-06 RX ADMIN — LORAZEPAM 0.5 MG: 2 INJECTION INTRAMUSCULAR; INTRAVENOUS at 14:20

## 2023-12-06 RX ADMIN — POTASSIUM CHLORIDE 10 MEQ: 1500 TABLET, EXTENDED RELEASE ORAL at 09:42

## 2023-12-06 RX ADMIN — METOPROLOL SUCCINATE 50 MG: 50 TABLET, EXTENDED RELEASE ORAL at 12:00

## 2023-12-06 RX ADMIN — Medication 5 MG/HR: at 11:33

## 2023-12-06 RX ADMIN — FLUTICASONE PROPIONATE 2 SPRAY: 50 SPRAY, METERED NASAL at 09:41

## 2023-12-06 RX ADMIN — PANTOPRAZOLE SODIUM 40 MG: 40 INJECTION, POWDER, FOR SOLUTION INTRAVENOUS at 09:32

## 2023-12-06 RX ADMIN — CLOPIDOGREL BISULFATE 75 MG: 75 TABLET ORAL at 16:12

## 2023-12-06 RX ADMIN — LORAZEPAM 0.5 MG: 2 INJECTION INTRAMUSCULAR; INTRAVENOUS at 09:00

## 2023-12-06 RX ADMIN — VALSARTAN 160 MG: 40 TABLET, FILM COATED ORAL at 10:03

## 2023-12-06 RX ADMIN — ENOXAPARIN SODIUM 40 MG: 100 INJECTION SUBCUTANEOUS at 09:41

## 2023-12-06 NOTE — DISCHARGE INSTRUCTIONS
Your information:  Name: Madison Jara  : 1975    Your Discharge Instructions    What to do after you leave the hospital:    Read, review and familiarize yourself with the information provided below and in a separate packet on  CVA/ Stroke- Stress Test- PVC'S,  Metoprolol, Valsartan, Nicotine    Diet:  Regular    Recommended activity: As Tolerated  Avoid strenuous activity until instructed by your physician to resume; balance rest with periods of light to normal activity. If you experience any of the following: Unusual or inadequately controlled pain; unusual transient shortness of breath; recurrent or persistent nausea, heartburn, palpitations or lightheadedness; increased swelling; increased fatigue; fever >100; please follow up with @PCP@ [unfilled] or go to the Emergency Room. Home Health/ Outpatient Services: N/A      Information obtained by:  By signing below, I understand and acknowledge receipt of the instructions indicated above, and I understand that if any problems occur once I leave the hospital I am to contact @PCP@. Orders from Hospitalist for Follow Up: Follow-up with your PCP within 4 to 5 days of discharge  Follow-up with cardiologist instructed  Follow up with your psychiatrist upon discharge  Take all your medications as prescribed.

## 2023-12-06 NOTE — PLAN OF CARE
Problem: Discharge Planning  Goal: Discharge to home or other facility with appropriate resources  Outcome: Progressing     Problem: Pain  Goal: Verbalizes/displays adequate comfort level or baseline comfort level  Outcome: Progressing     Problem: ABCDS Injury Assessment  Goal: Absence of physical injury  Outcome: Progressing     Problem: Neurosensory - Adult  Goal: Achieves stable or improved neurological status  Outcome: Progressing     Problem: Respiratory - Adult  Goal: Achieves optimal ventilation and oxygenation  Outcome: Progressing     Problem: Cardiovascular - Adult  Goal: Maintains optimal cardiac output and hemodynamic stability  Outcome: Progressing  Goal: Absence of cardiac dysrhythmias or at baseline  Outcome: Progressing     Problem: Skin/Tissue Integrity - Adult  Goal: Skin integrity remains intact  Outcome: Progressing     Problem: Gastrointestinal - Adult  Goal: Minimal or absence of nausea and vomiting  Outcome: Progressing  Goal: Maintains or returns to baseline bowel function  Outcome: Progressing  Goal: Maintains adequate nutritional intake  Outcome: Progressing     Problem: Genitourinary - Adult  Goal: Absence of urinary retention  Outcome: Progressing     Problem: Chronic Conditions and Co-morbidities  Goal: Patient's chronic conditions and co-morbidity symptoms are monitored and maintained or improved  Outcome: Progressing

## 2023-12-06 NOTE — PLAN OF CARE
Couldn't get HR to goal yesterday  HR low while sleeping  Increased HR yesterday likely due to anxiety worsened by withdrawal of Pristiq  Got Pristiq last night, psych following    -Increase metoprolol XL to 50 daily  -Restart dilt drip if HR now < 60  -As soon as CT scan is finished dilt drip should be stopped, as it is only needed for CT scan    -I have asked EP to see her to evaluate her numerous monomorphic PVCs and short runs of VT

## 2023-12-07 NOTE — DISCHARGE SUMMARY
Hospital Medicine Discharge Summary    Patient ID: Dior Pereira      Patient's PCP: Gerard Mcdowell, APRN - CNP    Admit Date: 12/1/2023     Discharge Date: 12/6/2023     Admitting Physician: Hill Johnston DO     Discharge Physician: Donte Bejarano MD     Hospital Course: This 51 female who was in [place] for vacation when she developed acute CVA. Had episodes of chest pain initially planned for stress test  but later cancelled due to  recent stroke. Acute left PCA stroke likely from thromboembolic  from PCA stenosis  MRI brain in [place] showed acute nfarct in left PCA distribution  CTA of head & neck showed left PCA stenosis otherwise no severe extracranial stenosis. Repeat MRI on admission showed scattered small acute to subacute infarction in left occipital lobe and the left PCA territory. Limited echo with bubble study showed no evidence of shunting. Neurology input appreciated. Continue aspirin, Plavix and statins     Chest pain: Hs troponin negative. EKG with PVCs and nonspecific T wave changes  Echo in [place] on 11/29/23 showed EF of 55 to 60%. Cardiology consulted; unable to do stress test given recent stroke  Coronary CTA ordered but could not be obtained due to patient anxiety and tachycardia. Plan for  Plan for cardiac stress test as outpatient in 2 weeks     Frequent PVCs and short run NSVT;  Patient remained asymptomatic  EP cardiology consulted; continue beta-blockers. Cardiac monitor provided at discharge and patient instructed to follow-up with cardiology    Anxiety/depression; likely secondary to SNRI withdrawal/dependence  Psych reconsulted and restarted back on desvenlafaxine and rexulti per their recs. Patient instructed follow with her psychiatrist for discharge    Hypertension; controlled.   Continue current regimen and monitor BP closely     Hyperlipidemia; continue statins     Hx of tobacco abuse; Counseled on tobacco cessation nicotine patch provided

## 2023-12-07 NOTE — PLAN OF CARE
Problem: Cardiovascular - Adult  Goal: Absence of cardiac dysrhythmias or at baseline  Outcome: Adequate for Discharge  Flowsheets (Taken 12/6/2023 1510)  Absence of cardiac dysrhythmias or at baseline:   Monitor cardiac rate and rhythm   Assess for signs of decreased cardiac output   Administer antiarrhythmia medication and electrolyte replacement as ordered

## 2023-12-08 ENCOUNTER — TELEPHONE (OUTPATIENT)
Dept: CARDIOLOGY CLINIC | Age: 48
End: 2023-12-08

## 2023-12-08 DIAGNOSIS — G43.719 INTRACTABLE CHRONIC MIGRAINE WITHOUT AURA AND WITHOUT STATUS MIGRAINOSUS: ICD-10-CM

## 2023-12-08 DIAGNOSIS — Z86.73 H/O ISCHEMIC LEFT PCA STROKE: Primary | ICD-10-CM

## 2023-12-08 NOTE — TELEPHONE ENCOUNTER
Pt calling wanting to know if her appt with [facility] on 1/17 is too far out or if that time is okay  Please advise   Thank you

## 2023-12-08 NOTE — TELEPHONE ENCOUNTER
Per Dr. Kirstin Malagon Refer to Flaca Lange. Pt was recently admitted for stroke. Cardiology was consulted for chest pain. Aaliyah Heidiada is agreeable to see Renee. I gave her the phone number near for the [facility], [facility] location: [phone]. I also sent a referral (for headaches, s/p stroke). Faxed to: [phone] (also faxed neuro consult note, MRI results x2). Confirmation received. She then asked for a referral to an ophthalmologist. I told her she could try calling [facility] as they may not need a referreral but if they do need one, her PCP CATRACHO can send it over to them. I gave her [facility] phone number for the [facility] [place] location: [phone]. She has OV with PCP CATRACHO next Wednesday as well.

## 2023-12-08 NOTE — TELEPHONE ENCOUNTER
Pt called back stating that [address] wanted her to see an neurologist. She is asking if  he had anyone that he could recommend for that. Please call to discuss.

## 2023-12-11 ENCOUNTER — TELEPHONE (OUTPATIENT)
Dept: CARDIOLOGY CLINIC | Age: 48
End: 2023-12-11

## 2023-12-11 DIAGNOSIS — R07.9 CHEST PAIN, UNSPECIFIED TYPE: Primary | ICD-10-CM

## 2023-12-11 NOTE — TELEPHONE ENCOUNTER
Pt is scheduled for a stress test at [facility], but the orders are for a NM MYOCARDIAL SPECT REST EXERCISE OR RX. This is at hospital only. Before I reschedule the patient, please advise if order is correct, or if pt should be getting plain stress.

## 2023-12-12 NOTE — TELEPHONE ENCOUNTER
Pt is rescheduled at [facility] 01/02/2024 @ 830 for Stress Echo. Pt understands. Can a new order be placed for the Stress Echo. Thank you.

## 2023-12-13 ENCOUNTER — OFFICE VISIT (OUTPATIENT)
Dept: FAMILY MEDICINE CLINIC | Age: 48
End: 2023-12-13
Payer: COMMERCIAL

## 2023-12-13 VITALS
WEIGHT: 215 LBS | HEART RATE: 65 BPM | OXYGEN SATURATION: 97 % | SYSTOLIC BLOOD PRESSURE: 126 MMHG | DIASTOLIC BLOOD PRESSURE: 84 MMHG | HEIGHT: 69 IN | BODY MASS INDEX: 31.84 KG/M2

## 2023-12-13 DIAGNOSIS — I63.532 ACUTE LEFT PCA STROKE (HCC): Primary | ICD-10-CM

## 2023-12-13 DIAGNOSIS — Z09 HOSPITAL DISCHARGE FOLLOW-UP: ICD-10-CM

## 2023-12-13 DIAGNOSIS — H53.8 BLURRED VISION: ICD-10-CM

## 2023-12-13 DIAGNOSIS — H57.11 ACUTE RIGHT EYE PAIN: ICD-10-CM

## 2023-12-13 DIAGNOSIS — I47.20 VT (VENTRICULAR TACHYCARDIA) (HCC): ICD-10-CM

## 2023-12-13 DIAGNOSIS — I20.89 ANGINA AT REST: ICD-10-CM

## 2023-12-13 DIAGNOSIS — E78.00 PURE HYPERCHOLESTEROLEMIA: ICD-10-CM

## 2023-12-13 DIAGNOSIS — I10 ESSENTIAL HYPERTENSION: ICD-10-CM

## 2023-12-13 PROCEDURE — 99214 OFFICE O/P EST MOD 30 MIN: CPT | Performed by: NURSE PRACTITIONER

## 2023-12-13 PROCEDURE — 1111F DSCHRG MED/CURRENT MED MERGE: CPT | Performed by: NURSE PRACTITIONER

## 2023-12-13 RX ORDER — ATORVASTATIN CALCIUM 80 MG/1
80 TABLET, FILM COATED ORAL NIGHTLY
Qty: 30 TABLET | Refills: 2 | Status: SHIPPED | OUTPATIENT
Start: 2023-12-13

## 2023-12-13 RX ORDER — ASPIRIN 81 MG/1
81 TABLET ORAL DAILY
Qty: 30 TABLET | Refills: 2 | Status: SHIPPED | OUTPATIENT
Start: 2023-12-13

## 2023-12-13 RX ORDER — VALSARTAN 160 MG/1
160 TABLET ORAL DAILY
Qty: 30 TABLET | Refills: 2 | Status: SHIPPED | OUTPATIENT
Start: 2023-12-13

## 2023-12-13 RX ORDER — TRAMADOL HYDROCHLORIDE 50 MG/1
50 TABLET ORAL EVERY 8 HOURS PRN
Qty: 21 TABLET | Refills: 0 | Status: SHIPPED | OUTPATIENT
Start: 2023-12-13 | End: 2023-12-20

## 2023-12-13 RX ORDER — TRAMADOL HYDROCHLORIDE 50 MG/1
50 TABLET ORAL EVERY 4 HOURS PRN
Qty: 42 TABLET | Refills: 0 | Status: SHIPPED | OUTPATIENT
Start: 2023-12-13 | End: 2023-12-13 | Stop reason: SDUPTHER

## 2023-12-13 RX ORDER — AMLODIPINE BESYLATE 10 MG/1
10 TABLET ORAL DAILY
Qty: 30 TABLET | Refills: 2 | Status: SHIPPED | OUTPATIENT
Start: 2023-12-13

## 2023-12-13 RX ORDER — METOPROLOL SUCCINATE 50 MG/1
50 TABLET, EXTENDED RELEASE ORAL DAILY
Qty: 30 TABLET | Refills: 2 | Status: SHIPPED | OUTPATIENT
Start: 2023-12-13

## 2023-12-13 ASSESSMENT — COLUMBIA-SUICIDE SEVERITY RATING SCALE - C-SSRS
3. HAVE YOU BEEN THINKING ABOUT HOW YOU MIGHT KILL YOURSELF?: NO
7. DID THIS OCCUR IN THE LAST THREE MONTHS: NO
4. HAVE YOU HAD THESE THOUGHTS AND HAD SOME INTENTION OF ACTING ON THEM?: NO
5. HAVE YOU STARTED TO WORK OUT OR WORKED OUT THE DETAILS OF HOW TO KILL YOURSELF? DO YOU INTEND TO CARRY OUT THIS PLAN?: NO

## 2023-12-13 ASSESSMENT — PATIENT HEALTH QUESTIONNAIRE - PHQ9
5. POOR APPETITE OR OVEREATING: 0
8. MOVING OR SPEAKING SO SLOWLY THAT OTHER PEOPLE COULD HAVE NOTICED. OR THE OPPOSITE, BEING SO FIGETY OR RESTLESS THAT YOU HAVE BEEN MOVING AROUND A LOT MORE THAN USUAL: 0
9. THOUGHTS THAT YOU WOULD BE BETTER OFF DEAD, OR OF HURTING YOURSELF: 0
SUM OF ALL RESPONSES TO PHQ QUESTIONS 1-9: 6
6. FEELING BAD ABOUT YOURSELF - OR THAT YOU ARE A FAILURE OR HAVE LET YOURSELF OR YOUR FAMILY DOWN: 0
10. IF YOU CHECKED OFF ANY PROBLEMS, HOW DIFFICULT HAVE THESE PROBLEMS MADE IT FOR YOU TO DO YOUR WORK, TAKE CARE OF THINGS AT HOME, OR GET ALONG WITH OTHER PEOPLE: 1
SUM OF ALL RESPONSES TO PHQ QUESTIONS 1-9: 6
1. LITTLE INTEREST OR PLEASURE IN DOING THINGS: 1
SUM OF ALL RESPONSES TO PHQ QUESTIONS 1-9: 6
7. TROUBLE CONCENTRATING ON THINGS, SUCH AS READING THE NEWSPAPER OR WATCHING TELEVISION: 0
4. FEELING TIRED OR HAVING LITTLE ENERGY: 3
3. TROUBLE FALLING OR STAYING ASLEEP: 1
SUM OF ALL RESPONSES TO PHQ QUESTIONS 1-9: 6
2. FEELING DOWN, DEPRESSED OR HOPELESS: 1
SUM OF ALL RESPONSES TO PHQ9 QUESTIONS 1 & 2: 2

## 2023-12-13 NOTE — PATIENT INSTRUCTIONS
You may receive a survey regarding the care you received during your visit. Your input is valuable to us. We encourage you to complete and return your survey. We hope you will choose us in the future for your healthcare needs. GENERAL OFFICE POLICIES      Telephone Calls: Messages will be answered within 1-2 business days, unless the provider is out of the office. If it is urgent a covering provider will answer. (this does not include Medication refills). MyChart: We recommend all patients sign up for [facility]. Through this portal you can see your lab results, request refills, schedule appointments, pay your bill and send messages to the office. [employer] messages will be answered within 1-2 business days unless the provider is out of the office. For urgent matters, please call the office. Appointments:  All appointments must be scheduled. We ask all patients to schedule their next follow up appointment before they leave the office to make sure you will be able to be seen before you run out of medications. 24 hours notice is required to cancel or reschedule an appointment to avoid being marked as a no show. You may be dismissed from the practice after 3 no shows. LATE for Appointment: If you are 15 or more minutes late for your appointment, you may be asked to reschedule. MA/LAB APPTS: Must be scheduled, cannot accept walk in lab visits. We only draw labs for patients established in our office. We only do injections for medications ordered by our office. Acute Sick Visits:  Nothing other than acute complaint will be addressed at this visit. TRADITIONAL MEDICARE  DOES NOT COVER PHYSICALS  MEDICARE WELLNESS VISITS: These are NOT physicals but the free annual visit offered by Medicare to discuss wellness issues. Medication refills, checkups, etc. will not be addressed during this visit.   Medication Refills: Refills are handled electronically so please contact your pharmacy for medication refills

## 2023-12-13 NOTE — PROGRESS NOTES
(TOPROL XL) 50 MG extended release tablet; Take 1 tablet by mouth daily, Disp-30 tablet, R-2Normal  VT (ventricular tachycardia) [facility])  [facility] notes and results reviewed. Medications reconciled. Has appointment scheduled with cardiology. Continue metoprolol. Likely plan for stress test and echo. Currently on a Holter monitor. Continue with management by them. -     metoprolol succinate (TOPROL XL) 50 MG extended release tablet; Take 1 tablet by mouth daily, Disp-30 tablet, R-2Normal  Hospital discharge follow-up  [facility] notes and results reviewed. Medications reconciled. -     PA DISCHARGE MEDS RECONCILED W/ CURRENT OUTPATIENT MED LIST  Pure hypercholesterolemia  -Hospital notes and results reviewed. Medications reconciled. Tolerating atorvastatin which is new for the patient. Has follow-up scheduled with cardiology. Follow-up with us in 3 months. -     atorvastatin (LIPITOR) 80 MG tablet; Take 1 tablet by mouth at bedtime, Disp-30 tablet, R-2Normal    Medical Decision Making: high complexity  Return in about 3 months (around 3/13/2024) for Follow-up stroke. Subjective:   HPI:  Follow up of Hospital problems/diagnosis(es):   Acute left PCA stroke  Chest pain  Frequent PVCs and short run NSVT  Anxiety/depression  Hypertension  Hyperlipidemia  History of tobacco abuse    Inpatient course: Discharge summary reviewed- see chart. Interval history/Current status:   Tamir Oakes presents today for hospital discharge follow-up. She was most recently admitted to [facility] from 12/1 until 12/6 following acute ischemic stroke. She had been admitted initially in [place] on 11/28 for the same issue. She states that they were in the [facility] when she all of a sudden developed severe right eye pain and some difficulty getting out words. She was found to have stroke down there.   When she came back up to the [place], she felt that the eye pain got worse so she went to the hospital

## 2023-12-14 ENCOUNTER — TELEPHONE (OUTPATIENT)
Dept: FAMILY MEDICINE CLINIC | Age: 48
End: 2023-12-14

## 2024-01-02 ENCOUNTER — TELEPHONE (OUTPATIENT)
Dept: CARDIOLOGY CLINIC | Age: 49
End: 2024-01-02

## 2024-01-02 ENCOUNTER — PROCEDURE VISIT (OUTPATIENT)
Dept: CARDIOLOGY CLINIC | Age: 49
End: 2024-01-02
Payer: COMMERCIAL

## 2024-01-02 DIAGNOSIS — R07.9 CHEST PAIN, UNSPECIFIED TYPE: Primary | ICD-10-CM

## 2024-01-02 DIAGNOSIS — R94.39 ABNORMAL STRESS ECHO: ICD-10-CM

## 2024-01-02 DIAGNOSIS — R07.9 CHEST PAIN, UNSPECIFIED TYPE: ICD-10-CM

## 2024-01-02 DIAGNOSIS — I10 ESSENTIAL HYPERTENSION: Primary | ICD-10-CM

## 2024-01-02 DIAGNOSIS — I20.89 ANGINA AT REST: ICD-10-CM

## 2024-01-02 PROCEDURE — 93320 DOPPLER ECHO COMPLETE: CPT | Performed by: INTERNAL MEDICINE

## 2024-01-02 PROCEDURE — 93351 STRESS TTE COMPLETE: CPT | Performed by: INTERNAL MEDICINE

## 2024-01-02 PROCEDURE — 93325 DOPPLER ECHO COLOR FLOW MAPG: CPT | Performed by: INTERNAL MEDICINE

## 2024-01-03 ENCOUNTER — TELEPHONE (OUTPATIENT)
Dept: CARDIOLOGY CLINIC | Age: 49
End: 2024-01-03

## 2024-01-03 ENCOUNTER — TELEPHONE (OUTPATIENT)
Dept: FAMILY MEDICINE CLINIC | Age: 49
End: 2024-01-03

## 2024-01-03 NOTE — TELEPHONE ENCOUNTER
Called pt and asked why Cardiology did not prescribe Plavix, she states that they want PCP to prescribe.  Pt is going to call Cardiology back and if they won't prescribe will send to CC to see if she is willing to send in Plavix./mv

## 2024-01-03 NOTE — TELEPHONE ENCOUNTER
2nd attempt to reach patient per WAKs message. NA/Unable to leave message due to mailbox not being set up.

## 2024-01-03 NOTE — TELEPHONE ENCOUNTER
Spoke to patient and advised to reach out to PCP or Neuro. Also sent a test [employer] message per pts request and she did receive it while on the phone. No further questions at this time.

## 2024-01-03 NOTE — TELEPHONE ENCOUNTER
Pt had a stroke  Nov 28 and when she came in here for a visit we told pt to go off the blood thinner after 21 days.  Today pt received a call from the cardiologist and they told her to call us that they do not think she should be off the blood thinner.      She will need a refill  on her Plavix if we decide she needs to stay on this.    Please call      [facility] on [address]

## 2024-01-03 NOTE — TELEPHONE ENCOUNTER
Pallavi [phone] is calling concerning the Plavix management. She states that Dr Saleh does not manage Plavix for the pt's.  He was going by what the hospital notes state that she needed to be on it for 21 days.     If pt needs to continue Plavix, another provider will need to provide the Plavix script.    Until pt finds neurologist, can we provide the Plavix.    Pt told pcp office she is going to the neurologist 1/17 for hospital follow up, but that is her appointment with RAYMOND.    Please advise PCP office of outcome.

## 2024-01-03 NOTE — TELEPHONE ENCOUNTER
Spoke to patient and discussed need for Jacqueline due to not meeting target on stress echo yesterday. Pt verbalized understanding and is agreeable to call [facility] and schedule test. Number given.     WAK- Pt states that she was told by PCP to stop her Plavix and that she only needed it for 21 days. I have researched chart and not able to find a duration or that PCP in fact discontinued. Can you please advise.     LWMA- pt is stating she mailed her monitor on 12/20. I do not see results. Can you please check on this for me. Thank you!

## 2024-01-03 NOTE — TELEPHONE ENCOUNTER
Per CC call Cardiology and spoke to Kenya.  Kenya is going to send a note to Dr. Ewing to see if Plavix needs to be continued and if he will prescribe.  Will call back with Dr. Ewing's correspondence./mv

## 2024-01-04 RX ORDER — CLOPIDOGREL BISULFATE 75 MG/1
75 TABLET ORAL DAILY
Qty: 90 TABLET | Refills: 1 | Status: SHIPPED | OUTPATIENT
Start: 2024-01-04

## 2024-01-04 NOTE — TELEPHONE ENCOUNTER
Spoke to patient per Guesty message. Plavix ordered and sent to verified pharmacy. Pt verbalized understanding and is agreeable to plan. No further questions at this time.

## 2024-01-08 ENCOUNTER — TELEPHONE (OUTPATIENT)
Dept: CARDIOLOGY CLINIC | Age: 49
End: 2024-01-08

## 2024-01-08 DIAGNOSIS — I10 ESSENTIAL HYPERTENSION: ICD-10-CM

## 2024-01-08 DIAGNOSIS — I47.20 VT (VENTRICULAR TACHYCARDIA) (HCC): ICD-10-CM

## 2024-01-08 DIAGNOSIS — I20.89 ANGINA AT REST: ICD-10-CM

## 2024-01-08 RX ORDER — AMLODIPINE BESYLATE 5 MG/1
5 TABLET ORAL DAILY
Qty: 90 TABLET | Refills: 3 | Status: SHIPPED | OUTPATIENT
Start: 2024-01-08

## 2024-01-08 RX ORDER — METOPROLOL SUCCINATE 100 MG/1
100 TABLET, EXTENDED RELEASE ORAL DAILY
Qty: 90 TABLET | Refills: 3 | Status: SHIPPED | OUTPATIENT
Start: 2024-01-08

## 2024-01-08 NOTE — TELEPHONE ENCOUNTER
Per WAK - Holter shows high PVC burden 17%  Let's increase metoprolol xl to 100 and decrease amlodipine to 5 mg daily    I updated medication doses in medication management.      I called and spoke to Brittany and informed her of the above message.  She verbalized understanding and requested for prescriptions for the new doses of medications to be sent to the Pharmacy.  I sent them over.  She denied any further questions and/or concerns at this time.

## 2024-01-09 ENCOUNTER — TELEPHONE (OUTPATIENT)
Dept: CARDIOLOGY CLINIC | Age: 49
End: 2024-01-09

## 2024-01-09 NOTE — TELEPHONE ENCOUNTER
Pt insurance dept called stating the stress test scheduled for 1/10/2024 has been denied, and it needs a peer to peer. [phone]    Denial dept did not have the reason for denial

## 2024-01-09 NOTE — TELEPHONE ENCOUNTER
Attempted to reach patient, NA/unable to leave message due to no mailbox. MyChart message sent.     Test canceled. Please continue to try to reach the patient to advise per WAKs message. Thank you!

## 2024-01-10 ENCOUNTER — HOSPITAL ENCOUNTER (OUTPATIENT)
Dept: NON INVASIVE DIAGNOSTICS | Age: 49
Discharge: HOME | End: 2024-01-10

## 2024-01-10 ENCOUNTER — TELEPHONE (OUTPATIENT)
Dept: CARDIOLOGY CLINIC | Age: 49
End: 2024-01-10

## 2024-01-10 NOTE — TELEPHONE ENCOUNTER
Per RAYMOND, he would like patient seen by EP asap. Testing has been denied. Pt had NSVT on monitor. Thank you!

## 2024-01-12 PROBLEM — I49.3 PVC (PREMATURE VENTRICULAR CONTRACTION): Status: ACTIVE | Noted: 2024-01-12

## 2024-01-12 PROBLEM — Z86.73 H/O ISCHEMIC LEFT PCA STROKE: Status: ACTIVE | Noted: 2024-01-12

## 2024-01-12 NOTE — PROGRESS NOTES
[facility] HEART [place]      CONSULTATION  [phone]  1/17/24  Referring: \ Kamran Saleh APRN - CNP (PCP)    REASON FOR CONSULT/CHIEF COMPLAINT/HPI     Reason for visit/ Chief complaint  Recent stroke  Chest pain  Nonsustained VT in hospital  Palpitations, frequent PVCs on monitor     HPI Brittany Mccall is a 48 y.o. who has a history of HTN hypokalemia, DVT during pregnancy, and a recent ischemc stroke (11/2023 in [place]).    She was frustrated with the care she received in [facility] thus went home AMA and family drove her to [facility] 12/1/2023 to be admitted.  Main problem with her stroke involved vision in her right eye.  Echo in [facility] was normal, although they were unable to get an IV for bubble study.  At [facility], bubble was done and negative.    While in the hospital, she had a very long but technically nonsustained (~20 sec) run of VT at 300 bpm.  She had chest pain when this occurred.  We were consulted for the chest pain and the arrhythmia.  She had been on Pristiq for severe anxiety and had been inappropriately told in [facility] that it \"wasn't safe for your heart\" and was stopped, so her anxiety was much worse while in our hospital.    After review of her symptoms, I was initially concerned about ischemia given her VT and smoking history, and I attempted to get a coronary CTA.  However, despite very valiant efforts (including consulting psych and getting her back on her Pristiq, IV diltiazem, etc), couldn't get her HR down.  I discussed her rhythm tracings with Dr. 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  She was started on beta blocker therapy and sent home with a monitor and plans for a stress echo.    She has trouble walking due to a torn ligament in one of  her feet, for which she was supposed to have surgery 12/13 (canceled due to acute issues).  Stress echo was done, and normal but only reached 79% MAPHR.  LVEF was very hyperdynamic and 
orders placed during the hospital encounter of 12/01/23    CT CHEST PULMONARY EMBOLISM W CONTRAST    Narrative  EXAMINATION:  CTA OF THE CHEST 12/2/2023 8:56 am    TECHNIQUE:  CTA of the chest was performed after the administration of intravenous  contrast.  Multiplanar reformatted images are provided for review.  MIP  images are provided for review. Automated exposure control, iterative  reconstruction, and/or weight based adjustment of the mA/kV was utilized to  reduce the radiation dose to as low as reasonably achievable.    COMPARISON:  Chest x-ray December 1, 2023    HISTORY:  ORDERING SYSTEM PROVIDED HISTORY: chest pain goiing to  neck. CT PE and CT  aortic dissection dual protocol  TECHNOLOGIST PROVIDED HISTORY:  Reason for exam:->chest pain goiing to  neck. CT PE and CT aortic dissection  dual protocol  Reason for Exam: chest pain goiing to neck. CT PE and CT aortic dissection  dual protocol    FINDINGS:  Pulmonary Arteries: Pulmonary arteries are adequately opacified for  evaluation.  No evidence of intraluminal filling defect to suggest pulmonary  embolism.  Main pulmonary artery is normal in caliber.    Mediastinum: Thoracic aorta is normal in size with no dissection or  significant atherosclerotic disease.  Proximal great vessels are  unremarkable.  Heart is normal in size.  No pericardial effusion.  No  adenopathy.  Reflux noted in the mid and distal esophagus.    Lungs/pleura: Mild subsegmental atelectasis in the dependent portions of the  lungs.  No pleural effusion or pneumothorax.    Upper Abdomen: No acute upper abdominal abnormality.    Soft Tissues/Bones: Mild multilevel degenerative changes of the spine.  No  acute osseous abnormality.    Impression  Negative for pulmonary embolus or aortic dissection.    Mild subsegmental atelectasis in the dependent portion of the lungs.  Mild  gastroesophageal reflux    12/01/23    CTA HEAD NECK W CONTRAST 12/02/2023 10:52 AM

## 2024-01-17 ENCOUNTER — OFFICE VISIT (OUTPATIENT)
Dept: CARDIOLOGY CLINIC | Age: 49
End: 2024-01-17
Payer: COMMERCIAL

## 2024-01-17 VITALS
HEIGHT: 69 IN | BODY MASS INDEX: 32.38 KG/M2 | SYSTOLIC BLOOD PRESSURE: 132 MMHG | OXYGEN SATURATION: 97 % | WEIGHT: 218.6 LBS | HEART RATE: 43 BPM | DIASTOLIC BLOOD PRESSURE: 82 MMHG

## 2024-01-17 DIAGNOSIS — I49.3 PVC (PREMATURE VENTRICULAR CONTRACTION): Primary | ICD-10-CM

## 2024-01-17 DIAGNOSIS — R07.9 CHEST PAIN, UNSPECIFIED TYPE: ICD-10-CM

## 2024-01-17 DIAGNOSIS — F17.200 SMOKING: ICD-10-CM

## 2024-01-17 DIAGNOSIS — Z86.73 H/O ISCHEMIC LEFT PCA STROKE: ICD-10-CM

## 2024-01-17 DIAGNOSIS — I47.29 RVOT VENTRICULAR TACHYCARDIA (HCC): ICD-10-CM

## 2024-01-17 DIAGNOSIS — E78.00 PURE HYPERCHOLESTEROLEMIA: ICD-10-CM

## 2024-01-17 DIAGNOSIS — I10 ESSENTIAL HYPERTENSION: ICD-10-CM

## 2024-01-17 PROBLEM — I47.19 PAT (PAROXYSMAL ATRIAL TACHYCARDIA): Status: ACTIVE | Noted: 2024-01-17

## 2024-01-17 PROCEDURE — G8427 DOCREV CUR MEDS BY ELIG CLIN: HCPCS | Performed by: INTERNAL MEDICINE

## 2024-01-17 PROCEDURE — 1036F TOBACCO NON-USER: CPT | Performed by: INTERNAL MEDICINE

## 2024-01-17 PROCEDURE — 99215 OFFICE O/P EST HI 40 MIN: CPT | Performed by: INTERNAL MEDICINE

## 2024-01-17 PROCEDURE — G8417 CALC BMI ABV UP PARAM F/U: HCPCS | Performed by: INTERNAL MEDICINE

## 2024-01-17 PROCEDURE — 93000 ELECTROCARDIOGRAM COMPLETE: CPT | Performed by: INTERNAL MEDICINE

## 2024-01-17 PROCEDURE — 3079F DIAST BP 80-89 MM HG: CPT | Performed by: INTERNAL MEDICINE

## 2024-01-17 PROCEDURE — 3075F SYST BP GE 130 - 139MM HG: CPT | Performed by: INTERNAL MEDICINE

## 2024-01-17 PROCEDURE — G8484 FLU IMMUNIZE NO ADMIN: HCPCS | Performed by: INTERNAL MEDICINE

## 2024-01-17 NOTE — PATIENT INSTRUCTIONS
Follow up with Dr Ewing in 3 months     Stay on Plavix until seen by Neurology and they can decide on stopping.     Once your symptoms are under control, we can give you clearance for your foot surgery if it is within the next year. Call the office and we can write a letter when the time comes.     Follow up with Dr De La Garza tomorrow at 1:15pm at [place] office     Call for any questions or concerns.

## 2024-01-17 NOTE — PROGRESS NOTES
[facility] HEART [place]      CONSULTATION  [phone]  1/17/24  Referring: \ Kamran Saleh APRN - CNP (PCP)    REASON FOR CONSULT/CHIEF COMPLAINT/HPI     Reason for visit/ Chief complaint  Recent stroke  Chest pain  Nonsustained VT in hospital  Palpitations, frequent PVCs on monitor     HPI Brittany Mccall is a 48 y.o. who has a history of HTN hypokalemia, DVT during pregnancy, and a recent ischemc stroke (11/2023 in [place]).    She was frustrated with the care she received in [facility] thus went home AMA and family drove her to [facility] 12/1/2023 to be admitted.  Main problem with her stroke involved vision in her right eye.  Echo in [facility] was normal, although they were unable to get an IV for bubble study.  At [facility], bubble was done and negative.    While in the hospital, she had a very long but technically nonsustained (~20 sec) run of VT at 300 bpm.  She had chest pain when this occurred.  We were consulted for the chest pain and the arrhythmia.  She had been on Pristiq for severe anxiety and had been inappropriately told in [facility] that it \"wasn't safe for your heart\" and was stopped, so her anxiety was much worse while in our hospital.    After review of her symptoms, I was initially concerned about ischemia given her VT and smoking history, and I attempted to get a coronary CTA.  However, despite very valiant efforts (including consulting psych and getting her back on her Pristiq, IV diltiazem, etc), couldn't get her HR down.  I discussed her rhythm tracings with Dr. 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  She was started on beta blocker therapy and sent home with a monitor and plans for a stress echo.    She has trouble walking due to a torn ligament in one of  her feet, for which she was supposed to have surgery 12/13 (canceled due to acute issues).  Stress echo was done, and normal but only reached 79% MAPHR.  LVEF was very hyperdynamic and

## 2024-01-17 NOTE — PROGRESS NOTES
[facility]   Electrophysiology   Date: 1/18/2024    Chief Complaint   Patient presents with    PVC (premature ventricular contraction)     Frequent fluttering.        CC: PVC, NSVT    HPI: Brittany Mccall is a 48 y.o. female with past medical history of HTN, hypokalemia, DVT during pregnancy and ischemic stroke 11/2023 in [place] involving vision in her right eye. She was unhappy with her care in [place] so her family brought her to [facility] .     While in the hospital she had ~ 20 sec run of VT associated with Chest pain. 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 She was started on a beta blocker.          Interval History: Brittany presents today in hospital follow up . We discussed her monitor results.  She states she is very tired. 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   Discussed ablation  procedure and risks and benefits.   She is tired and fatigued and had symptoms with her PVCs.  We discussed the option of monitoring versus ablation.  She wants to proceed with ablation of PVC.  He can stop metoprolol after the ablation.    PAT   - 13 short episodes noted on  monitor       HTN  -Controlled  -BP goal <130/80  -Home BP monitoring encouraged, printed information provided on how to accurately measure BP at home.  -Counseled to follow a low salt diet to assure blood pressure remains controlled for cardiovascular risk factor modification.   -The patient is counseled to get regular exercise 3-5 times per week and maintain a healthy weight reduce cardiovascular risk factors.    - continue norvasc, metoprolol, valsartan         CVA    Follows with [facility]   Continue Plavix ,asa    Appointment with Neurology  01/2024

## 2024-01-18 ENCOUNTER — OFFICE VISIT (OUTPATIENT)
Dept: CARDIOLOGY CLINIC | Age: 49
End: 2024-01-18
Payer: COMMERCIAL

## 2024-01-18 VITALS
DIASTOLIC BLOOD PRESSURE: 72 MMHG | WEIGHT: 214.1 LBS | OXYGEN SATURATION: 98 % | SYSTOLIC BLOOD PRESSURE: 130 MMHG | BODY MASS INDEX: 31.71 KG/M2 | HEART RATE: 69 BPM | HEIGHT: 69 IN

## 2024-01-18 DIAGNOSIS — I47.20 VT (VENTRICULAR TACHYCARDIA) (HCC): ICD-10-CM

## 2024-01-18 DIAGNOSIS — I10 ESSENTIAL HYPERTENSION: ICD-10-CM

## 2024-01-18 DIAGNOSIS — Z86.73 H/O ISCHEMIC LEFT PCA STROKE: ICD-10-CM

## 2024-01-18 DIAGNOSIS — I47.19 PAT (PAROXYSMAL ATRIAL TACHYCARDIA): ICD-10-CM

## 2024-01-18 DIAGNOSIS — I49.3 PVC (PREMATURE VENTRICULAR CONTRACTION): Primary | ICD-10-CM

## 2024-01-18 PROCEDURE — G8484 FLU IMMUNIZE NO ADMIN: HCPCS | Performed by: INTERNAL MEDICINE

## 2024-01-18 PROCEDURE — 99214 OFFICE O/P EST MOD 30 MIN: CPT | Performed by: INTERNAL MEDICINE

## 2024-01-18 PROCEDURE — G8427 DOCREV CUR MEDS BY ELIG CLIN: HCPCS | Performed by: INTERNAL MEDICINE

## 2024-01-18 PROCEDURE — 93000 ELECTROCARDIOGRAM COMPLETE: CPT | Performed by: INTERNAL MEDICINE

## 2024-01-18 PROCEDURE — G8417 CALC BMI ABV UP PARAM F/U: HCPCS | Performed by: INTERNAL MEDICINE

## 2024-01-18 PROCEDURE — 3078F DIAST BP <80 MM HG: CPT | Performed by: INTERNAL MEDICINE

## 2024-01-18 PROCEDURE — 3075F SYST BP GE 130 - 139MM HG: CPT | Performed by: INTERNAL MEDICINE

## 2024-01-18 PROCEDURE — 1036F TOBACCO NON-USER: CPT | Performed by: INTERNAL MEDICINE

## 2024-01-18 NOTE — PATIENT INSTRUCTIONS
PVC/VT ABLATION INFORMATION    Our  will be contacting you with a date and time for your procedure    The morning of your ablation you will need to check in at the registration desk in the main lobby.     PRE-PROCEDURE INSTRUCTIONS -   -Do not eat or drink anything after midnight the night before your ablation.  -You will need to have a responsible adult to drive you home.  -Your nurse will provide you with discharge instructions.  -You will need to hold your metoprolol  for four days and hold plavix for four days  before the procedure   -If you are taking a diuretic (water pill) please hold the morning of the procedure  -If you take long acting insulin, take 1/2 the dose the evening before or morning of depending on when you take your dosage.  -You may take all of your other medications with a sip of water.    You will be scheduled for a 6-8 week follow up with cardiology.  This will be done prior to your discharge instructions.    If you have any questions regarding the procedure itself or medications, please call [phone] and ask to speak to an EP nurse.

## 2024-01-24 ENCOUNTER — TELEPHONE (OUTPATIENT)
Dept: CARDIOLOGY CLINIC | Age: 49
End: 2024-01-24

## 2024-01-24 NOTE — TELEPHONE ENCOUNTER
Spoke with the pt and made her aware we don't have any dates currently. She isn't feeling the best. I advised her to call RN if  her symptoms get worse at all. I will be in touch as soon as I have dates.     Procedure - ABLATION INFORMATION  Date:  Arrival time:  Procedure time:       Our  will be contacting you with a date and time for your procedure     The morning of your ablation you will need to check in at the registration desk in the main lobby.                 PRE-PROCEDURE INSTRUCTIONS -   -Do not eat or drink anything after midnight the night before your ablation.  -You will need to have a responsible adult to drive you home.  -Your nurse will provide you with discharge instructions.  -You will need to hold your metoprolol and plavix for three to four days before the procedure.   -If you are taking a diuretic (water pill) please hold the morning of the procedure  -If you take long acting insulin, take 1/2 the dose the evening before or morning of depending on when you take your dosage.  -You may take all of your other medications with a sip of water.     You will be scheduled for a 6-8 week follow up with cardiology.  This will be done prior to your discharge instructions.     If you have any questions regarding the procedure itself or medications, please call [phone] and ask to speak to an EP nurse.

## 2024-02-07 ENCOUNTER — TELEPHONE (OUTPATIENT)
Dept: CARDIOLOGY CLINIC | Age: 49
End: 2024-02-07

## 2024-02-07 NOTE — TELEPHONE ENCOUNTER
Pt was calling Svetlana to check on the status of her ability to get her ablation scheduled.    [phone]

## 2024-02-07 NOTE — TELEPHONE ENCOUNTER
Spoke with the pt still in a holding pattern with no dates. I advised if she feels bad or symptoms increase to call speak with a nurse or go to ER.

## 2024-02-08 RX ORDER — NICOTINE 21 MG/24HR
1 PATCH, TRANSDERMAL 24 HOURS TRANSDERMAL DAILY
Qty: 7 PATCH | Refills: 0 | Status: CANCELLED | OUTPATIENT
Start: 2024-02-08 | End: 2024-02-15

## 2024-02-08 NOTE — TELEPHONE ENCOUNTER
Fine with doing but need more info.  Typically you will do this dose for a month and then slowly decrease at monthly intervals.  See if she has been taking this patch for a month and if she wants to decrease the dose.

## 2024-02-12 RX ORDER — NICOTINE 21 MG/24HR
1 PATCH, TRANSDERMAL 24 HOURS TRANSDERMAL DAILY
Qty: 42 PATCH | Refills: 0 | Status: SHIPPED | OUTPATIENT
Start: 2024-02-12 | End: 2024-03-25

## 2024-03-08 NOTE — TELEPHONE ENCOUNTER
Pt has seen Neurology and they believe she may have a whole in her heart. [facility]. They are wanting to do bubble studies and the insurance  isn't approving it. She is saying there has been no testing completed within [facility]. Wants to talk to someone about all of this and is very frustrated currently. We are going to schedule but if needs moved let me.      Procedure scheduled we went over instructions below and she verbalized understanding.     Procedure - ABLATION INFORMATION  Date: 5/14/24  Arrival time: 6:45 am   Procedure time: 7:30 am      Our  will be contacting you with a date and time for your procedure     The morning of your ablation you will need to check in at the registration desk in the main lobby.                 PRE-PROCEDURE INSTRUCTIONS -   -Do not eat or drink anything after midnight the night before your ablation.  -You will need to have a responsible adult to drive you home.  -Your nurse will provide you with discharge instructions.  -You will need to hold your metoprolol and plavix for three to four days before the procedure.   -If you are taking a diuretic (water pill) please hold the morning of the procedure  -If you take long acting insulin, take 1/2 the dose the evening before or morning of depending on when you take your dosage.  -You may take all of your other medications with a sip of water.     You will be scheduled for a 6-8 week follow up with cardiology.  This will be done prior to your discharge instructions.     If you have any questions regarding the procedure itself or medications, please call [phone] and ask to speak to an EP nurse.       Qgenda updated/ added in epic & carto / emailed cath lab

## 2024-03-12 ENCOUNTER — TELEPHONE (OUTPATIENT)
Dept: CARDIOLOGY CLINIC | Age: 49
End: 2024-03-12

## 2024-03-12 NOTE — TELEPHONE ENCOUNTER
----- Message from Svetlana Miranda sent at 3/11/2024  9:57 AM EDT -----  Can you call and speak to her and explain? She went off on me Friday. I'm not clinical so I can't explain everything or go over everything she was throwing out at me. She was very frustrated.     Svetlana   ----- Message -----  From: Tushar Peter APRN - CNP  Sent: 3/10/2024  10:40 AM EDT  To: Francia Bush LPN; Svetlana Miranda; #    Pt had an echo in December that had a negative bubble study. No need to repeat this test  ----- Message -----  From: Svetlana Miranda  Sent: 3/8/2024   4:14 PM EDT  To: [facility]    Pt has seen  Neurology and they believe she may have a whole in her heart. They are wanting to do bubble studies and the insurance  isn't approving it (denied 2) . She is saying there has been no testing completed within [facility]. Wants to talk to someone about all of this and is very frustrated currently. We are going to schedule but if needs moved let me (5/14).    I told her someone would reach out by Tuesday afternoon to address her concerns.     Thanks,   Svetlana     ----- Message -----  From: Linette Barone RN  Sent: 1/18/2024   1:40 PM EST  To: Svetlana Miranda

## 2024-03-13 NOTE — PROGRESS NOTES
[facility]      CONSULTATION  [phone]  4/3/24  Referring: \ Kamran Saleh APRN - CNP (PCP)    REASON FOR CONSULT/CHIEF COMPLAINT/HPI     Reason for visit/ Chief complaint  Recent stroke (cryptogenic)  Chest pain  Nonsustained VT in hospital  Palpitations, frequent PVCs on monitor     HPI Brittany Mccall is a 48 y.o. patient here for ongoing care of the above issues.    She has a history of HTN hypokalemia, DVT during pregnancy, and a recent ischemc stroke (11/2023 in [place]).    For review, she was frustrated with the care she received in [facility] thus went home AMA and family drove here to [facility] 12/1/2023 to be admitted.  Main problem with her stroke involved vision in her right eye.  Echo in [facility] was normal, although they were unable to get an IV for bubble study.  At [facility], bubble was done and negative. She has not had a NOEMI yet.    While in the hospital, she had a very long but technically nonsustained (~20 sec) run of VT at 300 bpm.  She had chest pain when this occurred.  We were consulted for the chest pain and the arrhythmia.  She had been on Pristiq for severe anxiety and had been inappropriately told in [facility] that it \"wasn't safe for your heart\" and was stopped, so her anxiety was much worse while in our hospital.    After review of her symptoms, I was initially concerned about ischemia given her VT and smoking history, and I attempted to get a coronary CTA.  We were unable to accomplish this due to elevated heart rate.  As an outpatient, she had a GXT that was nondiagnostic.  I then ordered an imaging stress test, which was denied by insurance.    Upon further review with Dr. 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  Her main risk factor for CAD is smoking.    Holter post-hospitalization showed a high PVC burden, as well as 2 episodes of NSVT, so I doubled her metoprolol from 50 to 100 last

## 2024-03-20 PROBLEM — I47.29 RVOT VENTRICULAR TACHYCARDIA (HCC): Status: ACTIVE | Noted: 2023-12-06

## 2024-04-03 ENCOUNTER — OFFICE VISIT (OUTPATIENT)
Dept: CARDIOLOGY CLINIC | Age: 49
End: 2024-04-03
Payer: COMMERCIAL

## 2024-04-03 ENCOUNTER — TELEPHONE (OUTPATIENT)
Dept: CARDIOLOGY CLINIC | Age: 49
End: 2024-04-03

## 2024-04-03 VITALS
HEIGHT: 69 IN | OXYGEN SATURATION: 95 % | HEART RATE: 57 BPM | DIASTOLIC BLOOD PRESSURE: 88 MMHG | BODY MASS INDEX: 33.53 KG/M2 | SYSTOLIC BLOOD PRESSURE: 140 MMHG | WEIGHT: 226.4 LBS

## 2024-04-03 DIAGNOSIS — F17.200 SMOKING: ICD-10-CM

## 2024-04-03 DIAGNOSIS — I10 ESSENTIAL HYPERTENSION: ICD-10-CM

## 2024-04-03 DIAGNOSIS — I47.29 RVOT VENTRICULAR TACHYCARDIA (HCC): ICD-10-CM

## 2024-04-03 DIAGNOSIS — I20.89 ANGINA AT REST (HCC): ICD-10-CM

## 2024-04-03 DIAGNOSIS — R07.9 CHEST PAIN, UNSPECIFIED TYPE: ICD-10-CM

## 2024-04-03 DIAGNOSIS — Z86.73 H/O ISCHEMIC LEFT PCA STROKE: ICD-10-CM

## 2024-04-03 DIAGNOSIS — E78.00 PURE HYPERCHOLESTEROLEMIA: ICD-10-CM

## 2024-04-03 DIAGNOSIS — I49.3 PVC (PREMATURE VENTRICULAR CONTRACTION): Primary | ICD-10-CM

## 2024-04-03 DIAGNOSIS — I63.9 CRYPTOGENIC STROKE (HCC): ICD-10-CM

## 2024-04-03 PROCEDURE — 3077F SYST BP >= 140 MM HG: CPT | Performed by: INTERNAL MEDICINE

## 2024-04-03 PROCEDURE — 3079F DIAST BP 80-89 MM HG: CPT | Performed by: INTERNAL MEDICINE

## 2024-04-03 PROCEDURE — G8417 CALC BMI ABV UP PARAM F/U: HCPCS | Performed by: INTERNAL MEDICINE

## 2024-04-03 PROCEDURE — G8427 DOCREV CUR MEDS BY ELIG CLIN: HCPCS | Performed by: INTERNAL MEDICINE

## 2024-04-03 PROCEDURE — 99214 OFFICE O/P EST MOD 30 MIN: CPT | Performed by: INTERNAL MEDICINE

## 2024-04-03 PROCEDURE — 1036F TOBACCO NON-USER: CPT | Performed by: INTERNAL MEDICINE

## 2024-04-03 RX ORDER — ASPIRIN 81 MG/1
81 TABLET ORAL DAILY
Qty: 90 TABLET | Refills: 3 | Status: SHIPPED | OUTPATIENT
Start: 2024-04-03

## 2024-04-03 RX ORDER — METOPROLOL SUCCINATE 100 MG/1
150 TABLET, EXTENDED RELEASE ORAL DAILY
Qty: 135 TABLET | Refills: 3 | Status: SHIPPED | OUTPATIENT
Start: 2024-04-03

## 2024-04-03 RX ORDER — CLOPIDOGREL BISULFATE 75 MG/1
75 TABLET ORAL DAILY
Qty: 90 TABLET | Refills: 3 | Status: SHIPPED | OUTPATIENT
Start: 2024-04-03

## 2024-04-03 NOTE — TELEPHONE ENCOUNTER
----- Message from Quintin Ewing MD sent at 4/3/2024  2:01 PM EDT -----  Regarding: changing anticoagulation plan  Jann    I discussed patient's case with hematology - she is just heterozygous for factor V and thus doesn't need Eliquis.  I put her back on ASA and Plavix.    Please let patient know if she needs to stop Plavix prior to the procedure.    I am ordering her a NOEMI to evaluate for PFO given her cryptogenic stroke.  Insurance probably won't approve that for at least 2 weeks.    Thanks    Finn

## 2024-04-03 NOTE — PATIENT INSTRUCTIONS
Follow up with Dr Ewing in 6 months     Raquel-  of NOEMI will call you to set up.     Discontinue Amlodipine and Eliquis  Restart Aspirin 81 mg daily and Plavix 75mg daily   Increase Metoprolol to 150mg daily     You will need to speak to Dr De La Garza's nurse about if you should hold Plavix for ablation     Recommend quitting smoking.     Call for any questions or concerns.

## 2024-04-04 ENCOUNTER — TELEPHONE (OUTPATIENT)
Dept: CARDIOLOGY CLINIC | Age: 49
End: 2024-04-04

## 2024-04-04 NOTE — TELEPHONE ENCOUNTER
----- Message from Tammi Garza RN sent at 4/3/2024  2:37 PM EDT -----  Regarding: RE: NOEMI  Okay no problem. He said just when you can is fine.     ----- Message -----  From: Raquel Kimball  Sent: 4/3/2024   2:12 PM EDT  To: Tammi Garza RN  Subject: RE: Brittany Durán's insurance requires 14 business days. I don't think it can be scheduled next week.   ----- Message -----  From: Tammi Garza RN  Sent: 4/3/2024   1:54 PM EDT  To: Raquel Kimball  Subject: NOEMI                                              Adeel García    Can you please schedule patient next week for NOEMI with [facility].     Thank you!     Mile

## 2024-04-05 ENCOUNTER — TELEPHONE (OUTPATIENT)
Dept: FAMILY MEDICINE CLINIC | Age: 49
End: 2024-04-05

## 2024-04-05 DIAGNOSIS — E78.00 PURE HYPERCHOLESTEROLEMIA: ICD-10-CM

## 2024-04-05 RX ORDER — ATORVASTATIN CALCIUM 80 MG/1
80 TABLET, FILM COATED ORAL NIGHTLY
Qty: 30 TABLET | Refills: 2 | Status: SHIPPED | OUTPATIENT
Start: 2024-04-05

## 2024-04-05 NOTE — TELEPHONE ENCOUNTER
Date of Procedure: Monday 4/29/24 @ [facility] with Dr. Ewing     Time of arrival: 1:00 pm     Procedure time: 2:00 pm     Called and spoke to Brittany and he is agreeable to date and time. Reviewed instructions and he verbalized understanding. Encouraged to call with any questions or concerns.     Published on [employer], scheduled in Epic and e-mailed to cath lab.

## 2024-04-20 DIAGNOSIS — J30.2 SEASONAL ALLERGIC RHINITIS, UNSPECIFIED TRIGGER: ICD-10-CM

## 2024-04-22 RX ORDER — CETIRIZINE HYDROCHLORIDE 10 MG/1
TABLET ORAL
Qty: 90 TABLET | Refills: 0 | Status: SHIPPED | OUTPATIENT
Start: 2024-04-22

## 2024-04-29 ENCOUNTER — HOSPITAL ENCOUNTER (OUTPATIENT)
Dept: CARDIAC CATH/INVASIVE PROCEDURES | Age: 49
Discharge: HOME | End: 2024-04-29
Attending: INTERNAL MEDICINE | Admitting: INTERNAL MEDICINE
Payer: COMMERCIAL

## 2024-04-29 ENCOUNTER — TELEPHONE (OUTPATIENT)
Dept: CARDIOLOGY CLINIC | Age: 49
End: 2024-04-29

## 2024-04-29 VITALS
SYSTOLIC BLOOD PRESSURE: 129 MMHG | WEIGHT: 225 LBS | DIASTOLIC BLOOD PRESSURE: 78 MMHG | OXYGEN SATURATION: 97 % | RESPIRATION RATE: 31 BRPM | TEMPERATURE: 98 F | BODY MASS INDEX: 33.33 KG/M2 | HEART RATE: 73 BPM | HEIGHT: 69 IN

## 2024-04-29 DIAGNOSIS — I63.9 CRYPTOGENIC STROKE (HCC): ICD-10-CM

## 2024-04-29 PROCEDURE — 93312 ECHO TRANSESOPHAGEAL: CPT

## 2024-04-29 PROCEDURE — 99152 MOD SED SAME PHYS/QHP 5/>YRS: CPT | Performed by: INTERNAL MEDICINE

## 2024-04-29 PROCEDURE — 93320 DOPPLER ECHO COMPLETE: CPT | Performed by: INTERNAL MEDICINE

## 2024-04-29 PROCEDURE — 7100000010 HC PHASE II RECOVERY - FIRST 15 MIN

## 2024-04-29 PROCEDURE — 93325 DOPPLER ECHO COLOR FLOW MAPG: CPT | Performed by: INTERNAL MEDICINE

## 2024-04-29 PROCEDURE — 93319 3D ECHO IMG CGEN CAR ANOMAL: CPT

## 2024-04-29 PROCEDURE — 93312 ECHO TRANSESOPHAGEAL: CPT | Performed by: INTERNAL MEDICINE

## 2024-04-29 PROCEDURE — 99152 MOD SED SAME PHYS/QHP 5/>YRS: CPT

## 2024-04-29 RX ORDER — SODIUM CHLORIDE 9 MG/ML
INJECTION, SOLUTION INTRAVENOUS PRN
Status: DISCONTINUED | OUTPATIENT
Start: 2024-04-29 | End: 2024-04-29 | Stop reason: HOSPADM

## 2024-04-29 RX ORDER — SODIUM CHLORIDE 0.9 % (FLUSH) 0.9 %
5-40 SYRINGE (ML) INJECTION EVERY 12 HOURS SCHEDULED
Status: DISCONTINUED | OUTPATIENT
Start: 2024-04-29 | End: 2024-04-29 | Stop reason: HOSPADM

## 2024-04-29 RX ORDER — SODIUM CHLORIDE 0.9 % (FLUSH) 0.9 %
5-40 SYRINGE (ML) INJECTION PRN
Status: DISCONTINUED | OUTPATIENT
Start: 2024-04-29 | End: 2024-04-29 | Stop reason: HOSPADM

## 2024-04-29 NOTE — PROGRESS NOTES
CATH LAB PROCEDURE LOG - TRANSESOPHAGEAL ECHOCARDIOGRAM    PRE PROCEDURE    DATE: 4/29/2024 ARRIVAL TO CATH LAB: 12:59 PM    ID & ALLERGY BAND: On    CONSENT: Yes    NPO SINCE: Midnight    IV SITE: Started in right arm.     Pt arrived to Cath Lab.   Plan of Care: Hemodynamics and cardiac rhythm will remain stable. Comfort level will be maintained. Respiratory function will remain adequate. Pt/family will verbalize understanding of the procedure. Procedure will be tolerated without complications. Patient will recover from procedure without complications.   ID armband on patient and identification verified.   Informed consent obtained.   Non invasive blood pressure cuff applied, monitoring initiated.   EKG pads and pulse oximeter applied, monitoring initiated.   Instructions given. Patient and / or family verbalize understanding.   H&P will be documented by physician in Epic.   Pt has been NPO since midnight.       TRANSESOPHAGEAL ECHOCARDIOGRAM    Timeout and fire safety completed.     TIMEOUT TIME: 1:17 PM    CORRECT PATIENT VERIFIED. MEMBERS OF THE SURGICAL TEAM/VISITORS INTRODUCED. ALLERGIES ANNOUNCED. CORRECT PROCEDURE VERIFIED. CORRECT PROCEDURAL SITE VERIFIED. CONSENTS VERIFIED. IMPLANT EQUIPMENT, ADDITIONAL SERVICES, SPECIAL REQUIREMENTS AVAILABLE. MEDICATIONS LABELED AND AVAILABLE. APPROPRIATE PRE MEDS HAVE BEEN ADMINISTERED.    FIRE SAFETY: ALCOHOL PREP SOLUTION HAD SUFFICIENT TIME TO DISSIPATE IF USED. FIRE SAFETY: SURGICAL SITE OR INCISION ABOVE THE XIPHOID. YES=1, NO=0. FIRE SAFETY: OPEN OXYGEN SOURCE. YES=1, NO=0. FIRE SAFETY: AVAILABLE IGNITION SOURCE. YES=1, NO=0. FIRE SAFETY: SCORE TOTAL = 1.     Viscous Lidocaine administered per verbal order : Yes 1:17 PM     Cetacaine spray administered per verbal order: Yes  1:19 PM      Procedural sedation administered per verbal order protocol via Dr. Ewing      Medications verified by Sagrario Marion RN    1:20 PM  - Versed 1 mg IV  1:23 PM - Versed 1 mg

## 2024-04-29 NOTE — H&P
NOEMI Procedure H & P    Patient seen/examined by me in office < 30 days ago on 4/3/2024.  I have confirmed there has been no interval change.    Patient with cryptogenic stroke in [place] last fall.    Given young age, here for NOEMI to definitively rule in or out a PFO.  If PFO is fouind, will refer her for PFO device closure.    BP (!) 145/96   Pulse 66   Temp 98 °F (36.7 °C)   Resp 18   Ht 1.753 m (5' 9\")   Wt 102.1 kg (225 lb)   LMP 04/11/2015 (Exact Date)   SpO2 98%   BMI 33.23 kg/m²     Physical Exam:  General:  Awake, alert, oriented x 3, NAD  Skin:  Warm and dry  Neck:  JVD none  Chest:  normal air entry  Cardiovascular:  RRR S1S2, no S3, no murmur  Abdomen:  Soft, ND, NT, No HSM  Extremities:  No edema

## 2024-04-29 NOTE — PRE SEDATION
Sedation Pre-Procedure Note    Patient Name: Brittany Mccall   YOB: 1975  Room/Bed: Cath/NONE  Medical Record Number: [id number]  Date: 4/29/2024   Time: 1:22 PM       Indication:  NOEMI    Consent: I have discussed with the patient and/or the patient representative the indication, alternatives, and the possible risks and/or complications of the planned procedure and the anesthesia methods. The patient and/or patient representative appear to understand and agree to proceed.    Vital Signs:   Vitals:    04/29/24 1317   BP:    Pulse:    Resp:    Temp:    SpO2: 98%       Past Medical History:   has a past medical history of Anxiety, DVT (deep vein thrombosis) in pregnancy, Hypertension, and Low blood potassium.    Past Surgical History:   has a past surgical history that includes Dilation and curettage of uterus; Ovary removal; Tubal ligation; Hysterectomy (5/28/15); and Abdominal exploration surgery (8/12/15).    Medications:   Scheduled Meds:    sodium chloride flush  5-40 mL IntraVENous 2 times per day     Continuous Infusions:    sodium chloride       PRN Meds: sodium chloride flush, sodium chloride  Home Meds:   Prior to Admission medications    Medication Sig Start Date End Date Taking? Authorizing Provider   cetirizine (EQ ALLERGY RELIEF, CETIRIZINE,) 10 MG tablet Take 1 tablet by mouth once daily 4/22/24   Glischinski, Luke A, APRN - CNP   atorvastatin (LIPITOR) 80 MG tablet Take 1 tablet by mouth at bedtime 4/5/24   Kamran Saleh APRN - CNP   metoprolol succinate (TOPROL XL) 100 MG extended release tablet Take 1.5 tablets by mouth daily 4/3/24   Quintin Ewnig MD   aspirin 81 MG EC tablet Take 1 tablet by mouth daily 4/3/24   Quintin Ewing MD   clopidogrel (PLAVIX) 75 MG tablet Take 1 tablet by mouth daily 4/3/24   Quintin Ewing MD   nicotine (NICODERM CQ) 21 MG/24HR Place 1 patch onto the skin daily 2/12/24 4/3/24  Kamran Saleh APRN - CNP   valsartan (DIOVAN) 160 MG

## 2024-04-29 NOTE — TELEPHONE ENCOUNTER
Can you please schedule patient an office visit with Dr. Liu at [place] on 5/6 to discuss ASD?

## 2024-04-29 NOTE — DISCHARGE INSTRUCTIONS
NOEMI DISCHARGE INSTRUCTIONS    No driving for 24 hours. We strongly recommend that a responsible adult stay with you for the next 24 hours.    Continue Medications.    Advance diet as tolerated    Contact physician office  for following symptoms:  Fever  Difficulty swallowing  Chest pain  Difficulty breathing  Bleeding

## 2024-04-29 NOTE — PROGRESS NOTES
Brief immediate postprocedure note       NOEMI performed w/o complications      Anesthesia: 1% viscous lidocaine, cetacaine spray   Sedation: 4 mg IV versed   Analgesia: 100 mcg IV fentanyl     Probe inserted easily and removed easily   Findings:     Small ~4 mm secundum ASD with mostly left-to-right shunting, some bubbles cross late across septum.  Normal function, no significant valve disease.      Complications: none   EBL: none   Specimens: N/A       CONSCIOUS SEDATION: Conscious sedation was given. The medication documented above was administered during the study.There was a trained observer present throughout the   entire time during which sedation was administered. Moderate sedation time   was 15 minutes.

## 2024-05-02 ENCOUNTER — TELEPHONE (OUTPATIENT)
Dept: CARDIOLOGY CLINIC | Age: 49
End: 2024-05-02

## 2024-05-02 DIAGNOSIS — K21.9 GASTROESOPHAGEAL REFLUX DISEASE WITHOUT ESOPHAGITIS: ICD-10-CM

## 2024-05-02 RX ORDER — ESOMEPRAZOLE MAGNESIUM 40 MG/1
CAPSULE, DELAYED RELEASE ORAL
Qty: 30 CAPSULE | Refills: 0 | Status: SHIPPED | OUTPATIENT
Start: 2024-05-02

## 2024-05-02 NOTE — TELEPHONE ENCOUNTER
I have updated her medicare insurance and she also has a secondary as well they are in correct order now

## 2024-05-02 NOTE — TELEPHONE ENCOUNTER
Spoke with the pt and she now has Medicare A/B. We are trying to obtain the account information so we can update the system. I told her not to cancel  her appt on Monday. We will get it figured out. Per Arin TAVAREZ- we can run SSN and get the medicare #.

## 2024-05-02 NOTE — TELEPHONE ENCOUNTER
Please update system to her new INS of Medicare. She will bring a card as soon as she gets it. She tossed the other without knowing it had a card in it.     Medicare A/B   [id number]

## 2024-05-02 NOTE — TELEPHONE ENCOUNTER
Spoke with the pt and we needed updated Ins information. Lidia is showing .  She will be calling back with that information. Please update system and let me know so we can get her pending procedure PA.

## 2024-05-06 ENCOUNTER — OFFICE VISIT (OUTPATIENT)
Dept: CARDIOLOGY CLINIC | Age: 49
End: 2024-05-06
Payer: MEDICARE

## 2024-05-06 VITALS
HEIGHT: 69 IN | WEIGHT: 229.4 LBS | HEART RATE: 64 BPM | OXYGEN SATURATION: 97 % | SYSTOLIC BLOOD PRESSURE: 128 MMHG | DIASTOLIC BLOOD PRESSURE: 86 MMHG | BODY MASS INDEX: 33.98 KG/M2

## 2024-05-06 DIAGNOSIS — Q21.12 PFO (PATENT FORAMEN OVALE): ICD-10-CM

## 2024-05-06 DIAGNOSIS — I63.9 CRYPTOGENIC STROKE (HCC): Primary | ICD-10-CM

## 2024-05-06 DIAGNOSIS — I10 ESSENTIAL HYPERTENSION: ICD-10-CM

## 2024-05-06 DIAGNOSIS — F17.200 SMOKING ADDICTION: ICD-10-CM

## 2024-05-06 DIAGNOSIS — I49.3 PVC (PREMATURE VENTRICULAR CONTRACTION): ICD-10-CM

## 2024-05-06 PROCEDURE — G8417 CALC BMI ABV UP PARAM F/U: HCPCS | Performed by: INTERNAL MEDICINE

## 2024-05-06 PROCEDURE — 4004F PT TOBACCO SCREEN RCVD TLK: CPT | Performed by: INTERNAL MEDICINE

## 2024-05-06 PROCEDURE — G8427 DOCREV CUR MEDS BY ELIG CLIN: HCPCS | Performed by: INTERNAL MEDICINE

## 2024-05-06 PROCEDURE — 99214 OFFICE O/P EST MOD 30 MIN: CPT | Performed by: INTERNAL MEDICINE

## 2024-05-06 PROCEDURE — 3079F DIAST BP 80-89 MM HG: CPT | Performed by: INTERNAL MEDICINE

## 2024-05-06 PROCEDURE — 3074F SYST BP LT 130 MM HG: CPT | Performed by: INTERNAL MEDICINE

## 2024-05-06 NOTE — PROGRESS NOTES
echocardiogram for CVA.  A bubble study was performed and fails to show evidence of shunting.    ECHO 11/29/2023  The study is of diagnostic quality.    1. Left ventricular ejection fraction, by visual estimation, is 55 to 60%.    2. Global left ventricular systolic function was normal.    3. Patient refused bubble study.    4. Borderline left ventricular hypertrophy.    5. There is no evidence of pericardial effusion.    6. There is no evidence of an atrial septal defect.     Stress Test:   Stress echo 1/2/2024   Summary    -Target heart was not achieved.    -No ischemia seen.    -Non-diagnostic. Reached 76% max predicted HR.    -Occasional PVC's    -Baseline ECHO with mild MR and TR     Cath:    Other imaging:   NOEMI 4/29/2024  Normal LV size and function.  LVEF 55-60%  There is a small high secundum ASD measuring approximately 4 mm in diameter  with predominantly left-to-right flow.  Intermittent right-to-left shunting noted on bubble study.    Given history of confirmed cryptogenic stroke, recommend referral for device  closure of ASD for secondary prevention of additional strokes.    24 hour event monitor 1/4/2024      Assessment and Plan     PFO  I recommend that she get a cardiac MRI to confirm. After reviewing cardiac MRI, I will schedule her for PFO closure.     Cryptogenic stoke  Continue Asa and Plavix.     Essential hypertension  Controlled. Goal BP <130/80. Continue medical therapy.    PVC's  Scheduled for PVC ablation 5/14/2024 with Dr De La Garza. Continue B-blocker.     Nicotine Addiction  Encouraged smoking cessation but patient is in the contemplative stage. 5 minutes spent in counseling.     Follow up after cardiac MRI.       Thank you for allowing us to participate in the care of Brittany IRENE Mccall.  Please do not hesitate to contact me if you have any questions.    Thee Liu MD, MPH    [facility]  [address], [address]   [place] [address]  Ph: [phone]  Fax: [phone]

## 2024-05-07 NOTE — PATIENT INSTRUCTIONS
at time of . Instead of the fee, you can choose to have the paperwork filled out during a separate office visit that is for filling out the paperwork only.  Medication Samples:  This office does not carry medication samples.  If you need assistance in getting your medications, then please let the medical assistant know so they can help you sign up for a drug assistance program that can help get medications at a reduced cost or even free (if you qualify).  Workman's Comp Claims: We do not handle workman's comp cases or claims. You will need to go to an urgent care to be seen or to whomever your employer uses.  General - Any abusive/rude behavior toward staff/providers may be cause for dismissal.      WE NOW OFFER [employer] SELF-SCHEDULING   IN 3 EASY STEPS    SCHEDULE AN APPT AT YOUR CONVENIENCE WITH NO HOLD/WAIT TIME  IN THE [employer] CONSUELO SELECT 'SCHEDULE AN APPOINTMENT' FROM THE MENU  CHOOSE DATE/TIME THAT WORKS FOR YOU    If you don't find an appointment time that works for your schedule, you can also submit an appointment request thru [employer].    CONVENIENT QUALITY CARE AT YOUR FINGERTIPS    NOT ON [employer]?  PLEASE ASK ANY STAFF MEMBER You may receive a survey regarding the care you received during your visit.  Your input is valuable to us.  We encourage you to complete and return your survey.  We hope you will choose us in the future for your healthcare needs. GENERAL OFFICE POLICIES      Telephone Calls: Messages will be answered within 1-2 business days, unless the provider is out of the office.  If it is urgent a covering provider will answer. (this does not include Medication refills).    MyChart:  We recommend all patients sign up for [employer]t.  Through this portal you can see your lab results, request refills, schedule appointments, pay your bill and send messages to the office.   [employer] messages will be answered within 1-2 business days unless the provider is out of the office.  For urgent matters,

## 2024-05-08 ENCOUNTER — OFFICE VISIT (OUTPATIENT)
Dept: FAMILY MEDICINE CLINIC | Age: 49
End: 2024-05-08
Payer: MEDICARE

## 2024-05-08 VITALS
HEART RATE: 52 BPM | BODY MASS INDEX: 33.47 KG/M2 | WEIGHT: 226 LBS | DIASTOLIC BLOOD PRESSURE: 70 MMHG | HEIGHT: 69 IN | SYSTOLIC BLOOD PRESSURE: 122 MMHG

## 2024-05-08 DIAGNOSIS — I47.19 PAT (PAROXYSMAL ATRIAL TACHYCARDIA) (HCC): ICD-10-CM

## 2024-05-08 DIAGNOSIS — Z86.73 H/O ISCHEMIC LEFT PCA STROKE: ICD-10-CM

## 2024-05-08 DIAGNOSIS — I10 ESSENTIAL HYPERTENSION: ICD-10-CM

## 2024-05-08 DIAGNOSIS — I47.29 RVOT VENTRICULAR TACHYCARDIA (HCC): ICD-10-CM

## 2024-05-08 DIAGNOSIS — K21.9 GASTROESOPHAGEAL REFLUX DISEASE WITHOUT ESOPHAGITIS: ICD-10-CM

## 2024-05-08 DIAGNOSIS — F41.1 GENERALIZED ANXIETY DISORDER: Primary | ICD-10-CM

## 2024-05-08 DIAGNOSIS — F33.1 MAJOR DEPRESSIVE DISORDER, RECURRENT, MODERATE (HCC): ICD-10-CM

## 2024-05-08 DIAGNOSIS — D68.51 FACTOR V LEIDEN (HCC): ICD-10-CM

## 2024-05-08 DIAGNOSIS — I73.9 PVD (PERIPHERAL VASCULAR DISEASE) (HCC): ICD-10-CM

## 2024-05-08 DIAGNOSIS — I20.89 ANGINA AT REST (HCC): ICD-10-CM

## 2024-05-08 DIAGNOSIS — J30.2 SEASONAL ALLERGIC RHINITIS, UNSPECIFIED TRIGGER: ICD-10-CM

## 2024-05-08 DIAGNOSIS — E78.00 PURE HYPERCHOLESTEROLEMIA: ICD-10-CM

## 2024-05-08 PROBLEM — I63.532 ACUTE LEFT PCA STROKE (HCC): Status: RESOLVED | Noted: 2023-12-01 | Resolved: 2024-05-08

## 2024-05-08 PROCEDURE — 3078F DIAST BP <80 MM HG: CPT | Performed by: NURSE PRACTITIONER

## 2024-05-08 PROCEDURE — 3074F SYST BP LT 130 MM HG: CPT | Performed by: NURSE PRACTITIONER

## 2024-05-08 PROCEDURE — G8417 CALC BMI ABV UP PARAM F/U: HCPCS | Performed by: NURSE PRACTITIONER

## 2024-05-08 PROCEDURE — 4004F PT TOBACCO SCREEN RCVD TLK: CPT | Performed by: NURSE PRACTITIONER

## 2024-05-08 PROCEDURE — G8427 DOCREV CUR MEDS BY ELIG CLIN: HCPCS | Performed by: NURSE PRACTITIONER

## 2024-05-08 PROCEDURE — 99214 OFFICE O/P EST MOD 30 MIN: CPT | Performed by: NURSE PRACTITIONER

## 2024-05-08 RX ORDER — DESVENLAFAXINE SUCCINATE 50 MG/1
50 TABLET, EXTENDED RELEASE ORAL DAILY
Qty: 30 TABLET | Status: CANCELLED | OUTPATIENT
Start: 2024-05-08

## 2024-05-08 RX ORDER — ASPIRIN 81 MG/1
81 TABLET ORAL DAILY
Qty: 90 TABLET | Status: CANCELLED | OUTPATIENT
Start: 2024-05-08

## 2024-05-08 RX ORDER — VALSARTAN 160 MG/1
160 TABLET ORAL DAILY
Qty: 90 TABLET | Refills: 2 | Status: SHIPPED | OUTPATIENT
Start: 2024-05-08

## 2024-05-08 RX ORDER — IPRATROPIUM BROMIDE AND ALBUTEROL SULFATE 2.5; .5 MG/3ML; MG/3ML
1 SOLUTION RESPIRATORY (INHALATION) EVERY 4 HOURS
Qty: 540 ML | Refills: 0 | Status: CANCELLED | OUTPATIENT
Start: 2024-05-08 | End: 2024-06-07

## 2024-05-08 RX ORDER — CETIRIZINE HYDROCHLORIDE 10 MG/1
TABLET ORAL
Qty: 90 TABLET | Refills: 2 | Status: SHIPPED | OUTPATIENT
Start: 2024-05-08

## 2024-05-08 RX ORDER — METOPROLOL SUCCINATE 100 MG/1
150 TABLET, EXTENDED RELEASE ORAL DAILY
Qty: 135 TABLET | Refills: 3 | Status: CANCELLED | OUTPATIENT
Start: 2024-05-08

## 2024-05-08 RX ORDER — BREXPIPRAZOLE 0.25 MG/1
0.5 TABLET ORAL 2 TIMES DAILY
Qty: 60 TABLET | Status: CANCELLED | OUTPATIENT
Start: 2024-05-08

## 2024-05-08 RX ORDER — CLONAZEPAM 0.5 MG/1
0.5 TABLET ORAL 2 TIMES DAILY PRN
Qty: 14 TABLET | Refills: 0 | Status: SHIPPED | OUTPATIENT
Start: 2024-05-08 | End: 2024-05-15

## 2024-05-08 RX ORDER — ESOMEPRAZOLE MAGNESIUM 40 MG/1
CAPSULE, DELAYED RELEASE ORAL
Qty: 90 CAPSULE | Refills: 2 | Status: SHIPPED | OUTPATIENT
Start: 2024-05-08

## 2024-05-08 RX ORDER — ACETAMINOPHEN 325 MG/1
650 TABLET ORAL EVERY 6 HOURS PRN
Qty: 120 TABLET | Refills: 2 | Status: SHIPPED | OUTPATIENT
Start: 2024-05-08

## 2024-05-08 RX ORDER — CLOPIDOGREL BISULFATE 75 MG/1
75 TABLET ORAL DAILY
Qty: 90 TABLET | Status: CANCELLED | OUTPATIENT
Start: 2024-05-08

## 2024-05-08 RX ORDER — ATORVASTATIN CALCIUM 80 MG/1
80 TABLET, FILM COATED ORAL NIGHTLY
Qty: 90 TABLET | Refills: 2 | Status: SHIPPED | OUTPATIENT
Start: 2024-05-08

## 2024-05-08 SDOH — ECONOMIC STABILITY: FOOD INSECURITY: WITHIN THE PAST 12 MONTHS, YOU WORRIED THAT YOUR FOOD WOULD RUN OUT BEFORE YOU GOT MONEY TO BUY MORE.: NEVER TRUE

## 2024-05-08 SDOH — ECONOMIC STABILITY: FOOD INSECURITY: WITHIN THE PAST 12 MONTHS, THE FOOD YOU BOUGHT JUST DIDN'T LAST AND YOU DIDN'T HAVE MONEY TO GET MORE.: NEVER TRUE

## 2024-05-08 SDOH — ECONOMIC STABILITY: INCOME INSECURITY: HOW HARD IS IT FOR YOU TO PAY FOR THE VERY BASICS LIKE FOOD, HOUSING, MEDICAL CARE, AND HEATING?: NOT HARD AT ALL

## 2024-05-08 ASSESSMENT — ENCOUNTER SYMPTOMS
VOMITING: 0
NAUSEA: 0
EYE REDNESS: 0
ABDOMINAL DISTENTION: 0
EYE DISCHARGE: 0
DIARRHEA: 0
COUGH: 0
ABDOMINAL PAIN: 0
SHORTNESS OF BREATH: 0
SINUS PAIN: 0
SINUS PRESSURE: 0
SORE THROAT: 0
EYE PAIN: 0
WHEEZING: 0

## 2024-05-08 ASSESSMENT — PATIENT HEALTH QUESTIONNAIRE - PHQ9
4. FEELING TIRED OR HAVING LITTLE ENERGY: SEVERAL DAYS
7. TROUBLE CONCENTRATING ON THINGS, SUCH AS READING THE NEWSPAPER OR WATCHING TELEVISION: SEVERAL DAYS
9. THOUGHTS THAT YOU WOULD BE BETTER OFF DEAD, OR OF HURTING YOURSELF: NOT AT ALL
8. MOVING OR SPEAKING SO SLOWLY THAT OTHER PEOPLE COULD HAVE NOTICED. OR THE OPPOSITE, BEING SO FIGETY OR RESTLESS THAT YOU HAVE BEEN MOVING AROUND A LOT MORE THAN USUAL: NOT AT ALL
SUM OF ALL RESPONSES TO PHQ QUESTIONS 1-9: 5
10. IF YOU CHECKED OFF ANY PROBLEMS, HOW DIFFICULT HAVE THESE PROBLEMS MADE IT FOR YOU TO DO YOUR WORK, TAKE CARE OF THINGS AT HOME, OR GET ALONG WITH OTHER PEOPLE: VERY DIFFICULT
SUM OF ALL RESPONSES TO PHQ QUESTIONS 1-9: 5
SUM OF ALL RESPONSES TO PHQ QUESTIONS 1-9: 5
SUM OF ALL RESPONSES TO PHQ9 QUESTIONS 1 & 2: 2
5. POOR APPETITE OR OVEREATING: NOT AT ALL
2. FEELING DOWN, DEPRESSED OR HOPELESS: SEVERAL DAYS
6. FEELING BAD ABOUT YOURSELF - OR THAT YOU ARE A FAILURE OR HAVE LET YOURSELF OR YOUR FAMILY DOWN: NOT AT ALL
1. LITTLE INTEREST OR PLEASURE IN DOING THINGS: SEVERAL DAYS
3. TROUBLE FALLING OR STAYING ASLEEP: SEVERAL DAYS
SUM OF ALL RESPONSES TO PHQ QUESTIONS 1-9: 5

## 2024-05-08 NOTE — PROGRESS NOTES
Brittany Mccall (:  1975) is a 48 y.o. female,Established patient, here for evaluation of the following chief complaint(s):  Hypertension (FOLLOW UP ON HTN ), Chronic Pain (FOLLOW UP ON CHRONIC PAIN), and Medication Refill (Need refills on all medications )      ASSESSMENT/PLAN:  1. Generalized anxiety disorder  -Uncontrolled at this time.  Discussed options.  Will prescribe clonazepam but emphasized the importance of making this temporary while she deals with uncertainty of diagnoses.  Reeducated on medication use as well as side effects.  Send in 7-day supply initially to determine effectiveness.  If needing chronically on temporary basis, would need to follow-up in 3 months.  Otherwise follow-up in 6 months.  Perform UDS and med contract at that time if continuing clonazepam.  The patient was educated on yearly requirements for chronic use of benzodiazepines including annual drug screen and medication contract.  She is in agreement with this if necessary, but she is hopeful that if she can get through the month she will not need this long-term.  -     clonazePAM (KLONOPIN) 0.5 MG tablet; Take 1 tablet by mouth 2 times daily as needed for Anxiety for up to 7 days. Max Daily Amount: 1 mg, Disp-14 tablet, R-0Normal  2. Angina at rest (HCC)  -Stable.  Following with cardiology.  Continue with management by them.  Most recent notes and results were reviewed.  3. RVOT ventricular tachycardia (HCC)  -Stable.  Following with cardiology.  Continue with management by them.  Most recent notes and results were reviewed.  4. PAT (paroxysmal atrial tachycardia) ([facility])  -Stable.  Following with cardiology.  Continue with management by them.  Most recent notes and results were reviewed.  5. Factor V Leiden ([facility])  -New diagnosis since last seen.  Continue with aspirin.  Patient states she initially was on Eliquis but cardiology recommended discontinuation.  Continue with management by them.  6. PVD (peripheral vascular

## 2024-05-13 ENCOUNTER — ANESTHESIA EVENT (OUTPATIENT)
Age: 49
End: 2024-05-13
Payer: MEDICARE

## 2024-05-14 ENCOUNTER — HOSPITAL ENCOUNTER (OUTPATIENT)
Age: 49
Setting detail: OUTPATIENT SURGERY
Discharge: HOME | End: 2024-05-14
Attending: INTERNAL MEDICINE | Admitting: INTERNAL MEDICINE
Payer: MEDICARE

## 2024-05-14 ENCOUNTER — ANESTHESIA (OUTPATIENT)
Age: 49
End: 2024-05-14
Payer: MEDICARE

## 2024-05-14 VITALS
BODY MASS INDEX: 34.07 KG/M2 | OXYGEN SATURATION: 94 % | HEART RATE: 88 BPM | WEIGHT: 230 LBS | RESPIRATION RATE: 18 BRPM | HEIGHT: 69 IN | SYSTOLIC BLOOD PRESSURE: 160 MMHG | TEMPERATURE: 97.6 F | DIASTOLIC BLOOD PRESSURE: 76 MMHG

## 2024-05-14 DIAGNOSIS — I49.3 VENTRICULAR PREMATURE BEATS: ICD-10-CM

## 2024-05-14 LAB
ANION GAP SERPL CALCULATED.3IONS-SCNC: 14 MMOL/L (ref 3–16)
BUN SERPL-MCNC: 12 MG/DL (ref 7–20)
CALCIUM SERPL-MCNC: 10.2 MG/DL (ref 8.3–10.6)
CHLORIDE SERPL-SCNC: 105 MMOL/L (ref 99–110)
CO2 SERPL-SCNC: 22 MMOL/L (ref 21–32)
CREAT SERPL-MCNC: 0.9 MG/DL (ref 0.6–1.1)
DEPRECATED RDW RBC AUTO: 14.1 % (ref 12.4–15.4)
ECHO BSA: 2.25 M2
EKG ATRIAL RATE: 84 BPM
EKG DIAGNOSIS: NORMAL
EKG P AXIS: 57 DEGREES
EKG P-R INTERVAL: 146 MS
EKG Q-T INTERVAL: 404 MS
EKG QRS DURATION: 80 MS
EKG QTC CALCULATION (BAZETT): 477 MS
EKG R AXIS: 48 DEGREES
EKG T AXIS: 50 DEGREES
EKG VENTRICULAR RATE: 84 BPM
GFR SERPLBLD CREATININE-BSD FMLA CKD-EPI: 78 ML/MIN/{1.73_M2}
GLUCOSE SERPL-MCNC: 95 MG/DL (ref 70–99)
HCT VFR BLD AUTO: 36.7 % (ref 36–48)
HGB BLD-MCNC: 12.6 G/DL (ref 12–16)
MCH RBC QN AUTO: 31 PG (ref 26–34)
MCHC RBC AUTO-ENTMCNC: 34.4 G/DL (ref 31–36)
MCV RBC AUTO: 90.1 FL (ref 80–100)
PLATELET # BLD AUTO: 323 K/UL (ref 135–450)
PMV BLD AUTO: 7.6 FL (ref 5–10.5)
POC ACT LR: 227 SEC
POC ACT LR: 273 SEC
POC ACT LR: 281 SEC
POTASSIUM SERPL-SCNC: 4.2 MMOL/L (ref 3.5–5.1)
RBC # BLD AUTO: 4.08 M/UL (ref 4–5.2)
SODIUM SERPL-SCNC: 141 MMOL/L (ref 136–145)
WBC # BLD AUTO: 11.1 K/UL (ref 4–11)

## 2024-05-14 PROCEDURE — 6370000000 HC RX 637 (ALT 250 FOR IP)

## 2024-05-14 PROCEDURE — 36415 COLL VENOUS BLD VENIPUNCTURE: CPT

## 2024-05-14 PROCEDURE — C1760 CLOSURE DEV, VASC: HCPCS | Performed by: INTERNAL MEDICINE

## 2024-05-14 PROCEDURE — 80048 BASIC METABOLIC PNL TOTAL CA: CPT

## 2024-05-14 PROCEDURE — C1769 GUIDE WIRE: HCPCS | Performed by: INTERNAL MEDICINE

## 2024-05-14 PROCEDURE — 85027 COMPLETE CBC AUTOMATED: CPT

## 2024-05-14 PROCEDURE — 93662 INTRACARDIAC ECG (ICE): CPT | Performed by: INTERNAL MEDICINE

## 2024-05-14 PROCEDURE — 3700000000 HC ANESTHESIA ATTENDED CARE: Performed by: INTERNAL MEDICINE

## 2024-05-14 PROCEDURE — 93654 COMPRE EP EVAL TX VT: CPT | Performed by: INTERNAL MEDICINE

## 2024-05-14 PROCEDURE — 7100000000 HC PACU RECOVERY - FIRST 15 MIN: Performed by: INTERNAL MEDICINE

## 2024-05-14 PROCEDURE — 6360000002 HC RX W HCPCS: Performed by: NURSE ANESTHETIST, CERTIFIED REGISTERED

## 2024-05-14 PROCEDURE — C1759 CATH, INTRA ECHOCARDIOGRAPHY: HCPCS | Performed by: INTERNAL MEDICINE

## 2024-05-14 PROCEDURE — C1766 INTRO/SHEATH,STRBLE,NON-PEEL: HCPCS | Performed by: INTERNAL MEDICINE

## 2024-05-14 PROCEDURE — 93623 PRGRMD STIMJ&PACG IV RX NFS: CPT

## 2024-05-14 PROCEDURE — 6360000002 HC RX W HCPCS: Performed by: ANESTHESIOLOGY

## 2024-05-14 PROCEDURE — 93654 COMPRE EP EVAL TX VT: CPT

## 2024-05-14 PROCEDURE — 2500000003 HC RX 250 WO HCPCS: Performed by: NURSE ANESTHETIST, CERTIFIED REGISTERED

## 2024-05-14 PROCEDURE — 6360000002 HC RX W HCPCS

## 2024-05-14 PROCEDURE — 2580000003 HC RX 258: Performed by: NURSE ANESTHETIST, CERTIFIED REGISTERED

## 2024-05-14 PROCEDURE — 7100000010 HC PHASE II RECOVERY - FIRST 15 MIN: Performed by: INTERNAL MEDICINE

## 2024-05-14 PROCEDURE — 93005 ELECTROCARDIOGRAM TRACING: CPT | Performed by: INTERNAL MEDICINE

## 2024-05-14 PROCEDURE — 2580000003 HC RX 258

## 2024-05-14 PROCEDURE — 2500000003 HC RX 250 WO HCPCS

## 2024-05-14 PROCEDURE — 2709999900 HC NON-CHARGEABLE SUPPLY: Performed by: INTERNAL MEDICINE

## 2024-05-14 PROCEDURE — 93623 PRGRMD STIMJ&PACG IV RX NFS: CPT | Performed by: INTERNAL MEDICINE

## 2024-05-14 PROCEDURE — 6370000000 HC RX 637 (ALT 250 FOR IP): Performed by: ANESTHESIOLOGY

## 2024-05-14 PROCEDURE — C1894 INTRO/SHEATH, NON-LASER: HCPCS | Performed by: INTERNAL MEDICINE

## 2024-05-14 PROCEDURE — 93010 ELECTROCARDIOGRAM REPORT: CPT | Performed by: INTERNAL MEDICINE

## 2024-05-14 PROCEDURE — C1732 CATH, EP, DIAG/ABL, 3D/VECT: HCPCS | Performed by: INTERNAL MEDICINE

## 2024-05-14 PROCEDURE — 7100000011 HC PHASE II RECOVERY - ADDTL 15 MIN: Performed by: INTERNAL MEDICINE

## 2024-05-14 PROCEDURE — 93662 INTRACARDIAC ECG (ICE): CPT

## 2024-05-14 PROCEDURE — 3700000001 HC ADD 15 MINUTES (ANESTHESIA): Performed by: INTERNAL MEDICINE

## 2024-05-14 PROCEDURE — 85347 COAGULATION TIME ACTIVATED: CPT

## 2024-05-14 PROCEDURE — 7100000001 HC PACU RECOVERY - ADDTL 15 MIN: Performed by: INTERNAL MEDICINE

## 2024-05-14 PROCEDURE — 6370000000 HC RX 637 (ALT 250 FOR IP): Performed by: NURSE ANESTHETIST, CERTIFIED REGISTERED

## 2024-05-14 RX ORDER — LIDOCAINE HYDROCHLORIDE 20 MG/ML
INJECTION, SOLUTION EPIDURAL; INFILTRATION; INTRACAUDAL; PERINEURAL PRN
Status: DISCONTINUED | OUTPATIENT
Start: 2024-05-14 | End: 2024-05-14 | Stop reason: SDUPTHER

## 2024-05-14 RX ORDER — FENTANYL CITRATE 50 UG/ML
INJECTION, SOLUTION INTRAMUSCULAR; INTRAVENOUS PRN
Status: DISCONTINUED | OUTPATIENT
Start: 2024-05-14 | End: 2024-05-14 | Stop reason: SDUPTHER

## 2024-05-14 RX ORDER — HYDROMORPHONE HYDROCHLORIDE 2 MG/ML
0.5 INJECTION, SOLUTION INTRAMUSCULAR; INTRAVENOUS; SUBCUTANEOUS EVERY 5 MIN PRN
Status: DISCONTINUED | OUTPATIENT
Start: 2024-05-14 | End: 2024-05-14 | Stop reason: HOSPADM

## 2024-05-14 RX ORDER — PROTAMINE SULFATE 10 MG/ML
INJECTION, SOLUTION INTRAVENOUS PRN
Status: DISCONTINUED | OUTPATIENT
Start: 2024-05-14 | End: 2024-05-14 | Stop reason: SDUPTHER

## 2024-05-14 RX ORDER — SODIUM CHLORIDE 0.9 % (FLUSH) 0.9 %
5-40 SYRINGE (ML) INJECTION EVERY 12 HOURS SCHEDULED
Status: CANCELLED | OUTPATIENT
Start: 2024-05-14

## 2024-05-14 RX ORDER — SODIUM CHLORIDE 9 MG/ML
INJECTION, SOLUTION INTRAVENOUS CONTINUOUS PRN
Status: DISCONTINUED | OUTPATIENT
Start: 2024-05-14 | End: 2024-05-14 | Stop reason: SDUPTHER

## 2024-05-14 RX ORDER — ONDANSETRON 2 MG/ML
4 INJECTION INTRAMUSCULAR; INTRAVENOUS
Status: DISCONTINUED | OUTPATIENT
Start: 2024-05-14 | End: 2024-05-14 | Stop reason: HOSPADM

## 2024-05-14 RX ORDER — SODIUM CHLORIDE 9 MG/ML
INJECTION, SOLUTION INTRAVENOUS PRN
Status: DISCONTINUED | OUTPATIENT
Start: 2024-05-14 | End: 2024-05-14 | Stop reason: HOSPADM

## 2024-05-14 RX ORDER — PROPOFOL 10 MG/ML
INJECTION, EMULSION INTRAVENOUS PRN
Status: DISCONTINUED | OUTPATIENT
Start: 2024-05-14 | End: 2024-05-14 | Stop reason: SDUPTHER

## 2024-05-14 RX ORDER — ONDANSETRON 2 MG/ML
INJECTION INTRAMUSCULAR; INTRAVENOUS PRN
Status: DISCONTINUED | OUTPATIENT
Start: 2024-05-14 | End: 2024-05-14 | Stop reason: SDUPTHER

## 2024-05-14 RX ORDER — PROCHLORPERAZINE EDISYLATE 5 MG/ML
5 INJECTION INTRAMUSCULAR; INTRAVENOUS
Status: DISCONTINUED | OUTPATIENT
Start: 2024-05-14 | End: 2024-05-14 | Stop reason: HOSPADM

## 2024-05-14 RX ORDER — ROCURONIUM BROMIDE 10 MG/ML
INJECTION, SOLUTION INTRAVENOUS PRN
Status: DISCONTINUED | OUTPATIENT
Start: 2024-05-14 | End: 2024-05-14 | Stop reason: SDUPTHER

## 2024-05-14 RX ORDER — FENTANYL CITRATE 50 UG/ML
25 INJECTION, SOLUTION INTRAMUSCULAR; INTRAVENOUS EVERY 5 MIN PRN
Status: DISCONTINUED | OUTPATIENT
Start: 2024-05-14 | End: 2024-05-14 | Stop reason: HOSPADM

## 2024-05-14 RX ORDER — SODIUM CHLORIDE 0.9 % (FLUSH) 0.9 %
5-40 SYRINGE (ML) INJECTION PRN
Status: DISCONTINUED | OUTPATIENT
Start: 2024-05-14 | End: 2024-05-14 | Stop reason: HOSPADM

## 2024-05-14 RX ORDER — CALCIUM CHLORIDE 100 MG/ML
INJECTION INTRAVENOUS; INTRAVENTRICULAR PRN
Status: DISCONTINUED | OUTPATIENT
Start: 2024-05-14 | End: 2024-05-14 | Stop reason: SDUPTHER

## 2024-05-14 RX ORDER — HEPARIN SODIUM 1000 [USP'U]/ML
INJECTION, SOLUTION INTRAVENOUS; SUBCUTANEOUS PRN
Status: DISCONTINUED | OUTPATIENT
Start: 2024-05-14 | End: 2024-05-14 | Stop reason: SDUPTHER

## 2024-05-14 RX ORDER — SODIUM CHLORIDE, SODIUM LACTATE, POTASSIUM CHLORIDE, CALCIUM CHLORIDE 600; 310; 30; 20 MG/100ML; MG/100ML; MG/100ML; MG/100ML
INJECTION, SOLUTION INTRAVENOUS CONTINUOUS
Status: CANCELLED | OUTPATIENT
Start: 2024-05-14

## 2024-05-14 RX ORDER — LABETALOL HYDROCHLORIDE 5 MG/ML
10 INJECTION, SOLUTION INTRAVENOUS
Status: DISCONTINUED | OUTPATIENT
Start: 2024-05-14 | End: 2024-05-14 | Stop reason: HOSPADM

## 2024-05-14 RX ORDER — LIDOCAINE HYDROCHLORIDE 10 MG/ML
1 INJECTION, SOLUTION EPIDURAL; INFILTRATION; INTRACAUDAL; PERINEURAL
Status: CANCELLED | OUTPATIENT
Start: 2024-05-14 | End: 2024-05-15

## 2024-05-14 RX ORDER — MIDAZOLAM HYDROCHLORIDE 1 MG/ML
INJECTION INTRAMUSCULAR; INTRAVENOUS PRN
Status: DISCONTINUED | OUTPATIENT
Start: 2024-05-14 | End: 2024-05-14 | Stop reason: SDUPTHER

## 2024-05-14 RX ORDER — DEXMEDETOMIDINE HYDROCHLORIDE 100 UG/ML
INJECTION, SOLUTION INTRAVENOUS PRN
Status: DISCONTINUED | OUTPATIENT
Start: 2024-05-14 | End: 2024-05-14 | Stop reason: SDUPTHER

## 2024-05-14 RX ORDER — HYDROMORPHONE HYDROCHLORIDE 2 MG/ML
INJECTION, SOLUTION INTRAMUSCULAR; INTRAVENOUS; SUBCUTANEOUS
Status: DISCONTINUED
Start: 2024-05-14 | End: 2024-05-14 | Stop reason: HOSPADM

## 2024-05-14 RX ORDER — ACETAMINOPHEN 325 MG/1
650 TABLET ORAL EVERY 4 HOURS PRN
Status: DISCONTINUED | OUTPATIENT
Start: 2024-05-14 | End: 2024-05-14 | Stop reason: HOSPADM

## 2024-05-14 RX ORDER — SODIUM CHLORIDE 0.9 % (FLUSH) 0.9 %
5-40 SYRINGE (ML) INJECTION EVERY 12 HOURS SCHEDULED
Status: DISCONTINUED | OUTPATIENT
Start: 2024-05-14 | End: 2024-05-14 | Stop reason: HOSPADM

## 2024-05-14 RX ORDER — PHENYLEPHRINE HCL IN 0.9% NACL 1 MG/10 ML
SYRINGE (ML) INTRAVENOUS PRN
Status: DISCONTINUED | OUTPATIENT
Start: 2024-05-14 | End: 2024-05-14 | Stop reason: SDUPTHER

## 2024-05-14 RX ORDER — ALBUTEROL SULFATE 90 UG/1
AEROSOL, METERED RESPIRATORY (INHALATION) PRN
Status: DISCONTINUED | OUTPATIENT
Start: 2024-05-14 | End: 2024-05-14 | Stop reason: SDUPTHER

## 2024-05-14 RX ORDER — HYDRALAZINE HYDROCHLORIDE 20 MG/ML
10 INJECTION INTRAMUSCULAR; INTRAVENOUS
Status: DISCONTINUED | OUTPATIENT
Start: 2024-05-14 | End: 2024-05-14 | Stop reason: HOSPADM

## 2024-05-14 RX ORDER — SODIUM CHLORIDE 9 MG/ML
INJECTION, SOLUTION INTRAVENOUS PRN
Status: CANCELLED | OUTPATIENT
Start: 2024-05-14

## 2024-05-14 RX ORDER — OXYCODONE HYDROCHLORIDE 5 MG/1
5 TABLET ORAL
Status: COMPLETED | OUTPATIENT
Start: 2024-05-14 | End: 2024-05-14

## 2024-05-14 RX ORDER — SODIUM CHLORIDE 0.9 % (FLUSH) 0.9 %
5-40 SYRINGE (ML) INJECTION PRN
Status: CANCELLED | OUTPATIENT
Start: 2024-05-14

## 2024-05-14 RX ORDER — OXYCODONE HYDROCHLORIDE 5 MG/1
5 TABLET ORAL
Status: DISCONTINUED | OUTPATIENT
Start: 2024-05-14 | End: 2024-05-14 | Stop reason: HOSPADM

## 2024-05-14 RX ORDER — AMINOPHYLLINE 25 MG/ML
INJECTION, SOLUTION INTRAVENOUS PRN
Status: DISCONTINUED | OUTPATIENT
Start: 2024-05-14 | End: 2024-05-14 | Stop reason: SDUPTHER

## 2024-05-14 RX ORDER — DEXAMETHASONE SODIUM PHOSPHATE 4 MG/ML
INJECTION, SOLUTION INTRA-ARTICULAR; INTRALESIONAL; INTRAMUSCULAR; INTRAVENOUS; SOFT TISSUE PRN
Status: DISCONTINUED | OUTPATIENT
Start: 2024-05-14 | End: 2024-05-14 | Stop reason: SDUPTHER

## 2024-05-14 RX ADMIN — DEXMEDETOMIDINE HYDROCHLORIDE 4 MCG: 100 INJECTION, SOLUTION INTRAVENOUS at 08:41

## 2024-05-14 RX ADMIN — FENTANYL CITRATE 25 MCG: 50 INJECTION, SOLUTION INTRAMUSCULAR; INTRAVENOUS at 09:44

## 2024-05-14 RX ADMIN — DEXMEDETOMIDINE HYDROCHLORIDE 4 MCG: 100 INJECTION, SOLUTION INTRAVENOUS at 08:35

## 2024-05-14 RX ADMIN — HEPARIN SODIUM 1000 UNITS: 1000 INJECTION INTRAVENOUS; SUBCUTANEOUS at 09:16

## 2024-05-14 RX ADMIN — LIDOCAINE HYDROCHLORIDE 60 MG: 20 INJECTION, SOLUTION EPIDURAL; INFILTRATION; INTRACAUDAL; PERINEURAL at 07:36

## 2024-05-14 RX ADMIN — CALCIUM CHLORIDE 0.5 G: 100 INJECTION, SOLUTION INTRAVENOUS at 08:14

## 2024-05-14 RX ADMIN — ONDANSETRON 4 MG: 2 INJECTION INTRAMUSCULAR; INTRAVENOUS at 10:06

## 2024-05-14 RX ADMIN — DEXMEDETOMIDINE HYDROCHLORIDE 4 MCG: 100 INJECTION, SOLUTION INTRAVENOUS at 09:13

## 2024-05-14 RX ADMIN — PROTAMINE SULFATE 30 MG: 10 INJECTION, SOLUTION INTRAVENOUS at 09:56

## 2024-05-14 RX ADMIN — ROCURONIUM BROMIDE 30 MG: 10 INJECTION, SOLUTION INTRAVENOUS at 09:44

## 2024-05-14 RX ADMIN — HEPARIN SODIUM 2000 UNITS: 1000 INJECTION INTRAVENOUS; SUBCUTANEOUS at 08:21

## 2024-05-14 RX ADMIN — ALBUTEROL SULFATE 2 PUFF: 90 AEROSOL, METERED RESPIRATORY (INHALATION) at 10:16

## 2024-05-14 RX ADMIN — HYDROMORPHONE HYDROCHLORIDE 0.5 MG: 2 INJECTION, SOLUTION INTRAMUSCULAR; INTRAVENOUS; SUBCUTANEOUS at 10:32

## 2024-05-14 RX ADMIN — DEXMEDETOMIDINE HYDROCHLORIDE 4 MCG: 100 INJECTION, SOLUTION INTRAVENOUS at 09:05

## 2024-05-14 RX ADMIN — PROPOFOL 150 MG: 10 INJECTION, EMULSION INTRAVENOUS at 07:36

## 2024-05-14 RX ADMIN — MIDAZOLAM 2 MG: 1 INJECTION INTRAMUSCULAR; INTRAVENOUS at 09:50

## 2024-05-14 RX ADMIN — ROCURONIUM BROMIDE 20 MG: 10 INJECTION, SOLUTION INTRAVENOUS at 08:28

## 2024-05-14 RX ADMIN — MIDAZOLAM 2 MG: 1 INJECTION INTRAMUSCULAR; INTRAVENOUS at 07:29

## 2024-05-14 RX ADMIN — AMINOPHYLLINE 150 MG: 25 INJECTION, SOLUTION INTRAVENOUS at 08:22

## 2024-05-14 RX ADMIN — SODIUM CHLORIDE: 9 INJECTION, SOLUTION INTRAVENOUS at 07:27

## 2024-05-14 RX ADMIN — DEXMEDETOMIDINE HYDROCHLORIDE 4 MCG: 100 INJECTION, SOLUTION INTRAVENOUS at 09:45

## 2024-05-14 RX ADMIN — SUGAMMADEX 200 MG: 100 INJECTION, SOLUTION INTRAVENOUS at 09:59

## 2024-05-14 RX ADMIN — MIDAZOLAM 2 MG: 1 INJECTION INTRAMUSCULAR; INTRAVENOUS at 09:08

## 2024-05-14 RX ADMIN — ROCURONIUM BROMIDE 50 MG: 10 INJECTION, SOLUTION INTRAVENOUS at 07:36

## 2024-05-14 RX ADMIN — ISOPROTERENOL HYDROCHLORIDE 0.5 MCG/MIN: 0.2 INJECTION, SOLUTION INTRAMUSCULAR; INTRAVENOUS at 08:34

## 2024-05-14 RX ADMIN — Medication 100 MCG: at 08:00

## 2024-05-14 RX ADMIN — DEXMEDETOMIDINE HYDROCHLORIDE 0.2 MCG: 100 INJECTION, SOLUTION INTRAVENOUS at 09:45

## 2024-05-14 RX ADMIN — FENTANYL CITRATE 50 MCG: 50 INJECTION, SOLUTION INTRAMUSCULAR; INTRAVENOUS at 09:52

## 2024-05-14 RX ADMIN — PHENYLEPHRINE HYDROCHLORIDE 50 MCG/MIN: 10 INJECTION INTRAVENOUS at 07:58

## 2024-05-14 RX ADMIN — ALBUTEROL SULFATE 2 PUFF: 90 AEROSOL, METERED RESPIRATORY (INHALATION) at 10:19

## 2024-05-14 RX ADMIN — CALCIUM CHLORIDE 0.5 G: 100 INJECTION, SOLUTION INTRAVENOUS at 08:10

## 2024-05-14 RX ADMIN — DEXAMETHASONE SODIUM PHOSPHATE 8 MG: 4 INJECTION, SOLUTION INTRAMUSCULAR; INTRAVENOUS at 10:06

## 2024-05-14 RX ADMIN — ISOPROTERENOL HYDROCHLORIDE 2 MCG/MIN: 0.2 INJECTION, SOLUTION INTRAMUSCULAR; INTRAVENOUS at 08:03

## 2024-05-14 RX ADMIN — OXYCODONE HYDROCHLORIDE 5 MG: 5 TABLET ORAL at 11:55

## 2024-05-14 RX ADMIN — PROPOFOL 120 MCG/KG/MIN: 10 INJECTION, EMULSION INTRAVENOUS at 07:38

## 2024-05-14 RX ADMIN — FENTANYL CITRATE 50 MCG: 50 INJECTION, SOLUTION INTRAMUSCULAR; INTRAVENOUS at 07:36

## 2024-05-14 RX ADMIN — FENTANYL CITRATE 50 MCG: 50 INJECTION, SOLUTION INTRAMUSCULAR; INTRAVENOUS at 07:50

## 2024-05-14 RX ADMIN — PROPOFOL 50 MG: 10 INJECTION, EMULSION INTRAVENOUS at 09:51

## 2024-05-14 ASSESSMENT — PAIN - FUNCTIONAL ASSESSMENT
PAIN_FUNCTIONAL_ASSESSMENT: 0-10
PAIN_FUNCTIONAL_ASSESSMENT: PREVENTS OR INTERFERES SOME ACTIVE ACTIVITIES AND ADLS
PAIN_FUNCTIONAL_ASSESSMENT: PREVENTS OR INTERFERES SOME ACTIVE ACTIVITIES AND ADLS

## 2024-05-14 ASSESSMENT — PAIN DESCRIPTION - DESCRIPTORS
DESCRIPTORS: ACHING
DESCRIPTORS: DISCOMFORT
DESCRIPTORS: DISCOMFORT

## 2024-05-14 ASSESSMENT — PAIN DESCRIPTION - LOCATION
LOCATION: GROIN
LOCATION: BACK

## 2024-05-14 ASSESSMENT — PAIN DESCRIPTION - ORIENTATION: ORIENTATION: RIGHT

## 2024-05-14 ASSESSMENT — PAIN SCALES - GENERAL
PAINLEVEL_OUTOF10: 8
PAINLEVEL_OUTOF10: 6

## 2024-05-14 ASSESSMENT — ENCOUNTER SYMPTOMS: SHORTNESS OF BREATH: 0

## 2024-05-14 NOTE — ANESTHESIA POSTPROCEDURE EVALUATION
Department of Anesthesiology  Postprocedure Note    Patient: Brittany Mccall  MRN: [id number]  YOB: 1975  Date of evaluation: 5/14/2024    Procedure Summary       Date: 05/14/24 Room / Location: [facility] EP LAB  / [facility] CARDIAC CATH LAB    Anesthesia Start: 0731 Anesthesia Stop: 1030    Procedure: ABLATION PVC Diagnosis:       Ventricular premature beats      (Ventricular premature beats [I49.3])    Providers: Jann De La Garza MD Responsible Provider: Rosalia Pelletier MD    Anesthesia Type: general ASA Status: 3            Anesthesia Type: No value filed.    Guzman Phase I: Guzman Score: 8    Guzman Phase II:      Anesthesia Post Evaluation    Patient location during evaluation: PACU  Patient participation: complete - patient participated  Level of consciousness: awake and alert  Airway patency: patent  Nausea & Vomiting: no nausea and no vomiting  Cardiovascular status: hemodynamically stable  Respiratory status: acceptable  Hydration status: stable  Multimodal analgesia pain management approach  Pain management: adequate    No notable events documented.

## 2024-05-14 NOTE — FLOWSHEET NOTE
Phase 1 complete, pt seen by anesthesiologist. VSS, pt resting comfortably. R groin site remains CDI, no hematoma noted. Pt remains in NSR. R pedal pulse remains palpable. Will transition to phase 2 for d/c.

## 2024-05-14 NOTE — DISCHARGE INSTRUCTIONS
ABLATION DISCHARGE INSTRUCTIONS    Care of your puncture site:  Remove bandage 24 hours after the procedure.  May shower in 24 hours but do not sit in a bathtub/pool of water for 3 days or until the wound is healed.  Inspect the site daily and gently clean using soap and water while standing in the shower.  Dry thoroughly and apply a Band-Aid that covers the entire site. Do not apply powder or lotion.    Normal Observations:  Soreness or tenderness which may last one week.  Mild oozing from the incision site.  Possible bruising that could last 2 weeks.    Activity:  You may resume driving 24 hours following the procedure.  You may resume normal activity in 3 days or after the wound heals.  Avoid lifting more than 10 pounds for 3 days or until the wound heals.  Avoid strenuous exercise or activity for 1 week.      Nutrition:  Regular diet       Call your doctor immediately if your condition worsens, for any other concerns, for a follow-up appointment or if you experience any of the following:  Significant bleeding that does not stop after 10 minutes of applying firm pressure on the puncture site.  Increased swelling on the groin or leg.  Unusual pain, numbness, or tingling of the groin or down the leg.  Any signs of infection such as: redness, yellow drainage at the site, swelling or pain.          ANESTHESIA DISCHARGE INSTRUCTIONS    Wear your seatbelt home.  You are under the influence of drugs-do not drink alcohol, drive, operate machinery, make any important decisions or sign any legal documents for 24 hours.  Children should not ride bikes, skate boards or play on gym sets for 24 hours after surgery.  A responsible adult needs to be with you for 24 hours.  You may experience lightheadedness, dizziness, or sleepiness following surgery.  Rest at home today- increase activity as tolerated.  Progress slowly to a regular diet unless your physician has instructed you otherwise. Drink plenty of water.  If persistent

## 2024-05-14 NOTE — ANESTHESIA PRE PROCEDURE
ASD for secondary prevention of additional strokes.       Neuro/Psych:   (+) headaches: migraine headachesdepression/anxiety    (-) CVA           GI/Hepatic/Renal:        (-) GERD and liver disease       Endo/Other:        (-) diabetes mellitus, hypothyroidism               Abdominal:             Vascular:   + DVT.  - PVD.      Other Findings:             Anesthesia Plan      general     ASA 3       Induction: intravenous.    MIPS: Postoperative opioids intended and Prophylactic antiemetics administered.  Anesthetic plan and risks discussed with patient.    Use of blood products discussed with patient whom.    Plan discussed with CRNA.                    Rosalia Pelletier MD   5/14/2024

## 2024-05-14 NOTE — H&P
[facility]   Electrophysiology       Chief Complaint   Patient presents with    PVC (premature ventricular contraction)     Frequent fluttering.        CC: PVC, NSVT    HPI: Brittany Mccall is a 48 y.o. female with past medical history of HTN, hypokalemia, DVT during pregnancy and ischemic stroke 11/2023 in [place] involving vision in her right eye. She was unhappy with her care in [place] so her family brought her to [facility] .     While in the hospital she had ~ 20 sec run of VT associated with Chest pain. 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 She was started on a beta blocker.          Interval History: Brittany presents today in hospital follow up . We discussed her monitor results.  She states she is very tired. 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   Discussed ablation  procedure and risks and benefits.   She is tired and fatigued and had symptoms with her PVCs.  We discussed the option of monitoring versus ablation.  She wants to proceed with ablation of PVC.  The risks, benefits and alternatives of the ablation procedure were discussed with the patient. The risks including, but not limited to, the risks of bleeding, infection, radiation exposure, injury to vascular, cardiac and surrounding structures (including pneumothorax), stroke, cardiac perforation, tamponade, need for emergent open heart surgery, need for pacemaker implantation, Injury to the phrenic nerve, injury to the esophagus, myocardial infarction and death were discussed in detail.           The patient opted to proceed with the ablation.       PAT   - 13 short episodes noted on  monitor       HTN  -Controlled  -BP goal <130/80  -Home BP monitoring  by mouth in the morning and at bedtime 9/28/22  Yes Provider, MD Anhai   nicotine (NICODERM CQ) 21 MG/24HR Place 1 patch onto the skin daily for 7 days 12/7/23 1/17/24  Emily Cerda MD   ipratropium-albuterol (DUONEB) 0.5-2.5 (3) MG/3ML SOLN nebulizer solution INHALE 3 MLS INTO THE LUNGS EVERY 4 HOURS  Patient taking differently: Inhale 3 mLs into the lungs every 6 hours as needed for Shortness of Breath 1/20/23 1/17/24  Kamran Saleh, APRN - CNP       Past Medical History:   Diagnosis Date    Anxiety     DVT (deep vein thrombosis) in pregnancy     left leg in small vessel    Hypertension     Low blood potassium     5/2015 patient states had recent critically low kt        Past Surgical History:   Procedure Laterality Date    ABDOMINAL EXPLORATION SURGERY  8/12/15    with repair of vagina     DILATION AND CURETTAGE OF UTERUS      HYSTERECTOMY (CERVIX STATUS UNKNOWN)  5/28/15    total laparoscopic with LEFT S&O    OVARY REMOVAL      right    TUBAL LIGATION         Allergies   Allergen Reactions    Amoxicillin-Pot Clavulanate     Bee Venom     Hydrochlorothiazide     Amoxicillin Nausea And Vomiting       Social History:  Reviewed.  reports that she has quit smoking. Her smoking use included cigarettes. She has a 3.0 pack-year smoking history. She has never used smokeless tobacco. She reports current alcohol use. She reports that she does not use drugs.     Family History:  Reviewed. Reviewed. No family history of SCD.      Relevant and available labs, and cardiovascular diagnostics reviewed.   Reviewed.

## 2024-05-14 NOTE — PROCEDURES
[facility]     Electrophysiology Procedure Note       Date of Procedure: 5/14/2024  Patient's Name: Brittany Mccall  YOB: 1975   Medical Record Number: [id number]  Procedure Performed by: Jann De La Garza MD    Procedure performed:  Electrophysiological study with ablation of   outflow tract PVCs  Attempted induction of arrhythmia after IV drug infusion.   Three-dimensional electroanatomic mapping of the right and left ventricle   Intracardiac ultrasound      Left atrial recording and mapping via coronary sinus        Indications for procedure:    Brittany Mccall is 48 y.o. female frequent symptomatic PVC      Details of Procedure:   The risks, benefits, alternatives of procedure were explained to the patient. The risks, benefits and alternatives of the ablation procedure were discussed with the patient. The risks including, but not limited to, the risks of bleeding, infection, radiation exposure, injury to vascular, cardiac and surrounding structures (including pneumothorax), stroke, cardiac perforation, tamponade, need for emergent open heart surgery, need for pacemaker implantation, myocardial infarction and death were discussed in detail. The patient opted to proceed with the ablation. Written informed consent was signed and placed in the chart. The patient was brought to the electrophysiology lab in a fasting nonsedated state.         Patient underwent general anesthesia.        Ultrasound was used for femoral venous access.        The femoral vein was identified with real time visualization of needle passage to the venous lumen.       We gained access in right femoral veins.  A 8 Bulgarian sheaths for ICE and initial mapping with the ablation catheter intermittently was placed in the left femoral vein using modified seldinger technique.     Ultrasound was used for femoral arterial access.      The femoral artery was identified with real time visualization of needle passage to the venous

## 2024-05-14 NOTE — FLOWSHEET NOTE
Pt arrived to PACU from OR, VSS, pt arouses to voice. R groin site is CDI, no hematoma noted. R pedal pulse is palpable. Pt is in NSR. Will continue to monitor.

## 2024-05-14 NOTE — PROGRESS NOTES
Pt up in [place] to ambulate and to br gait steady  rt groin site and dsg cdi groin site soft no bruising /swelling or bleeding noted discharge instructions given belongings returned

## 2024-05-14 NOTE — PROGRESS NOTES
Dsg to right groin cdi nobleeind ro swelling noted good pedal pulse to right foot. Call yelitzah in reach

## 2024-05-15 ENCOUNTER — TELEPHONE (OUTPATIENT)
Dept: CARDIOLOGY CLINIC | Age: 49
End: 2024-05-15

## 2024-05-15 DIAGNOSIS — I63.9 CRYPTOGENIC STROKE (HCC): Primary | ICD-10-CM

## 2024-05-15 RX ORDER — LORAZEPAM 1 MG/1
1 TABLET ORAL ONCE
Qty: 1 TABLET | Refills: 0
Start: 2024-05-15 | End: 2024-05-15

## 2024-05-15 NOTE — TELEPHONE ENCOUNTER
She can have ativan 1 mg before MRI   Called and made patient aware that Dr Liu will give her medication and confirmed pharmacy. Called Rx to [facility]. Encouraged her to call with any questions.

## 2024-05-19 ENCOUNTER — HOSPITAL ENCOUNTER (OUTPATIENT)
Dept: MRI IMAGING | Age: 49
Discharge: HOME | End: 2024-05-19
Attending: INTERNAL MEDICINE
Payer: MEDICARE

## 2024-05-19 DIAGNOSIS — Q21.12 PFO (PATENT FORAMEN OVALE): ICD-10-CM

## 2024-05-19 DIAGNOSIS — I63.9 CRYPTOGENIC STROKE (HCC): ICD-10-CM

## 2024-05-19 PROCEDURE — A9585 GADOBUTROL INJECTION: HCPCS | Performed by: INTERNAL MEDICINE

## 2024-05-19 PROCEDURE — 75561 CARDIAC MRI FOR MORPH W/DYE: CPT | Performed by: INTERNAL MEDICINE

## 2024-05-19 PROCEDURE — 75565 CARD MRI VELOC FLOW MAPPING: CPT

## 2024-05-19 PROCEDURE — 75565 CARD MRI VELOC FLOW MAPPING: CPT | Performed by: INTERNAL MEDICINE

## 2024-05-19 PROCEDURE — 6360000004 HC RX CONTRAST MEDICATION: Performed by: INTERNAL MEDICINE

## 2024-05-19 RX ORDER — GADOBUTROL 604.72 MG/ML
18 INJECTION INTRAVENOUS
Status: COMPLETED | OUTPATIENT
Start: 2024-05-19 | End: 2024-05-19

## 2024-05-19 RX ADMIN — GADOBUTROL 18 ML: 604.72 INJECTION INTRAVENOUS at 12:05

## 2024-05-20 ENCOUNTER — TELEPHONE (OUTPATIENT)
Dept: CARDIOLOGY CLINIC | Age: 49
End: 2024-05-20

## 2024-05-20 PROBLEM — I63.9 CRYPTOGENIC STROKE (HCC): Status: ACTIVE | Noted: 2024-05-20

## 2024-05-20 NOTE — TELEPHONE ENCOUNTER
Dr Liu, reviewed MRI and confirmed she does have ASD and needs closure. Called and spoke to patient and made her aware. She is agreeable to proceed.

## 2024-05-20 NOTE — TELEPHONE ENCOUNTER
She may continue to have palpitations off and on for a few weeks post ablation. If they worsen or do not improve we can have her come in for an ECG and possible monitor

## 2024-05-20 NOTE — TELEPHONE ENCOUNTER
Not resulted by Dr. Liu, performed 5/19/24    REASON FOR REFERRAL  Shunt    Normal LV and RV size and systolic function.  Bidirectional shunt found at the level of the interatrial septum (defect not  well see due to artifact), likely represents small ASD.   Calculated Qp:Qs of 1     -Normal left ventricular size and systolic function with a calculated ejection  fraction of 56% by Austin's method.  -Normal right ventricular size and systolic function with a calculated ejection  fraction of 54% by Austin's method.  -Interatrial septum: appears aneurysmal. There appears to be predominant left to  right shunting between the left and right atrium. Findings are suggestive of a  small ASD. There also appears to be intermittent right to left shunting.   -No myocardial edema by T2w imaging/mapping. (Normal myocardium/skeletal ratio -  normal < 1.9).  -No significant intracardiac shunt. Calculated Qp:Qs:1 (Phase contrast velocity  encoding Ao/Pa)  -Normal myocardial resting perfusion imaging.  -On delayed enhancement imaging, there is no abnormal hyperenhancement to  suggest myocardial scar/inflammation/infiltration.     The MRI sequences and imaging planes in this study were tailored for cardiac  imaging and are suboptimal for evaluation of non-cardiac structures.

## 2024-05-20 NOTE — TELEPHONE ENCOUNTER
EP Triage Questionnaire  Does the patient have a device? []Y  [x] N, If yes ask them to please send an interrogation.     What is their HR and BP? Did not have today's     Have they had a recent procedure? Yes ablation    What symptoms are they having? Feels racing in Chest at times    How long have they had symptoms? Since procedure     Have they taken or missed medications recently and if so which ones?   No missing medications

## 2024-05-22 DIAGNOSIS — R82.90 UNSPECIFIED ABNORMAL FINDINGS IN URINE: ICD-10-CM

## 2024-05-22 DIAGNOSIS — Q21.10 ATRIAL SEPTAL DEFECT: Primary | ICD-10-CM

## 2024-05-22 NOTE — TELEPHONE ENCOUNTER
Spoke with patient at length about proceeding with ASD closure. Patient would like to have procedure scheduled. She is agreeable to scheduling on 7/3 at 830am at [facility]. She is aware she will need to have labs and UA done 1 week prior. She will be NPO morning of the procedure. Reviewed all other instructions with patient. Patient had no other instructions and expressed understanding.

## 2024-05-29 ENCOUNTER — TELEPHONE (OUTPATIENT)
Dept: FAMILY MEDICINE CLINIC | Age: 49
End: 2024-05-29

## 2024-05-29 DIAGNOSIS — F41.1 GENERALIZED ANXIETY DISORDER: ICD-10-CM

## 2024-05-29 RX ORDER — CLONAZEPAM 0.5 MG/1
0.5 TABLET ORAL 2 TIMES DAILY PRN
Qty: 14 TABLET | Refills: 0 | Status: SHIPPED | OUTPATIENT
Start: 2024-05-29 | End: 2024-06-05

## 2024-05-29 NOTE — TELEPHONE ENCOUNTER
Pt is calling to get her Klonopin refilled  1 mg pt  takes this twice a day.    [facility] [phone]

## 2024-05-30 ENCOUNTER — HOSPITAL ENCOUNTER (OUTPATIENT)
Age: 49
Discharge: HOME | End: 2024-06-01
Attending: INTERNAL MEDICINE
Payer: MEDICARE

## 2024-05-30 ENCOUNTER — TELEPHONE (OUTPATIENT)
Dept: CARDIOLOGY CLINIC | Age: 49
End: 2024-05-30

## 2024-05-30 VITALS
SYSTOLIC BLOOD PRESSURE: 160 MMHG | BODY MASS INDEX: 34.07 KG/M2 | WEIGHT: 230 LBS | DIASTOLIC BLOOD PRESSURE: 76 MMHG | HEIGHT: 69 IN

## 2024-05-30 DIAGNOSIS — R00.2 PALPITATIONS: ICD-10-CM

## 2024-05-30 DIAGNOSIS — Z98.890 S/P ABLATION OF VENTRICULAR ARRHYTHMIA: ICD-10-CM

## 2024-05-30 DIAGNOSIS — Z86.79 S/P ABLATION OF VENTRICULAR ARRHYTHMIA: ICD-10-CM

## 2024-05-30 DIAGNOSIS — R07.9 CHEST PAIN, UNSPECIFIED TYPE: ICD-10-CM

## 2024-05-30 DIAGNOSIS — R07.9 CHEST PAIN, UNSPECIFIED TYPE: Primary | ICD-10-CM

## 2024-05-30 LAB — ECHO BSA: 2.25 M2

## 2024-05-30 PROCEDURE — 93325 DOPPLER ECHO COLOR FLOW MAPG: CPT

## 2024-05-30 RX ORDER — COLCHICINE 0.6 MG/1
0.6 TABLET ORAL 2 TIMES DAILY
Qty: 60 TABLET | Refills: 0 | Status: SHIPPED | OUTPATIENT
Start: 2024-05-30 | End: 2024-06-29

## 2024-05-30 NOTE — TELEPHONE ENCOUNTER
Spoke with patient, she has had these symptoms since the ablation on 5/14/24 but it was very bad last night.   Complains of heaviness in chest, heart racing on/off and palpitations.   Unable to check blood pressure and pulse.

## 2024-05-30 NOTE — TELEPHONE ENCOUNTER
Pt is still have pulpatation, intermediate hr, feeling of chest pain, pain above and belove left breast and worse @ night. This has been going on after 5/14. Please call to discuss.

## 2024-05-30 NOTE — TELEPHONE ENCOUNTER
A spoke with the patient regarding stat echo results. Will start on Colchicine 0.6 mg po BID for 1 month. Rx has been sent     Per COLIN: if she is to experience upset stomach may take once daily

## 2024-06-03 DIAGNOSIS — Q21.10 ASD (ATRIAL SEPTAL DEFECT): Primary | ICD-10-CM

## 2024-06-03 DIAGNOSIS — Q21.20 ATRIOVENTRICULAR SEPTAL DEFECT, UNSPECIFIED AS TO PARTIAL OR COMPLETE: ICD-10-CM

## 2024-06-05 DIAGNOSIS — Q21.10 ASD (ATRIAL SEPTAL DEFECT): Primary | ICD-10-CM

## 2024-06-05 DIAGNOSIS — Q21.20 ATRIOVENTRICULAR SEPTAL DEFECT, UNSPECIFIED AS TO PARTIAL OR COMPLETE: ICD-10-CM

## 2024-06-29 ENCOUNTER — HOSPITAL ENCOUNTER (OUTPATIENT)
Age: 49
Discharge: HOME | End: 2024-06-29
Payer: MEDICARE

## 2024-06-29 DIAGNOSIS — Q21.10 ATRIAL SEPTAL DEFECT: ICD-10-CM

## 2024-06-29 LAB
ANION GAP SERPL CALCULATED.3IONS-SCNC: 14 MMOL/L (ref 3–16)
BILIRUB UR QL STRIP.AUTO: NEGATIVE
BUN SERPL-MCNC: 9 MG/DL (ref 7–20)
CALCIUM SERPL-MCNC: 10.1 MG/DL (ref 8.3–10.6)
CHLORIDE SERPL-SCNC: 106 MMOL/L (ref 99–110)
CLARITY UR: CLEAR
CO2 SERPL-SCNC: 23 MMOL/L (ref 21–32)
COLOR UR: YELLOW
CREAT SERPL-MCNC: 0.9 MG/DL (ref 0.6–1.1)
DEPRECATED RDW RBC AUTO: 14.5 % (ref 12.4–15.4)
GFR SERPLBLD CREATININE-BSD FMLA CKD-EPI: 78 ML/MIN/{1.73_M2}
GLUCOSE SERPL-MCNC: 92 MG/DL (ref 70–99)
GLUCOSE UR STRIP.AUTO-MCNC: NEGATIVE MG/DL
HCT VFR BLD AUTO: 37 % (ref 36–48)
HGB BLD-MCNC: 12.6 G/DL (ref 12–16)
HGB UR QL STRIP.AUTO: NEGATIVE
KETONES UR STRIP.AUTO-MCNC: NEGATIVE MG/DL
LEUKOCYTE ESTERASE UR QL STRIP.AUTO: NEGATIVE
MCH RBC QN AUTO: 31 PG (ref 26–34)
MCHC RBC AUTO-ENTMCNC: 34.2 G/DL (ref 31–36)
MCV RBC AUTO: 90.6 FL (ref 80–100)
NITRITE UR QL STRIP.AUTO: NEGATIVE
PH UR STRIP.AUTO: 6 [PH] (ref 5–8)
PLATELET # BLD AUTO: 326 K/UL (ref 135–450)
PMV BLD AUTO: 8.3 FL (ref 5–10.5)
POTASSIUM SERPL-SCNC: 3.7 MMOL/L (ref 3.5–5.1)
PROT UR STRIP.AUTO-MCNC: NEGATIVE MG/DL
RBC # BLD AUTO: 4.08 M/UL (ref 4–5.2)
SODIUM SERPL-SCNC: 143 MMOL/L (ref 136–145)
SP GR UR STRIP.AUTO: 1.01 (ref 1–1.03)
UA DIPSTICK W REFLEX MICRO PNL UR: NORMAL
URN SPEC COLLECT METH UR: NORMAL
UROBILINOGEN UR STRIP-ACNC: 0.2 E.U./DL
WBC # BLD AUTO: 12.9 K/UL (ref 4–11)

## 2024-06-29 PROCEDURE — 80048 BASIC METABOLIC PNL TOTAL CA: CPT

## 2024-06-29 PROCEDURE — 85027 COMPLETE CBC AUTOMATED: CPT

## 2024-06-29 PROCEDURE — 87086 URINE CULTURE/COLONY COUNT: CPT

## 2024-06-29 PROCEDURE — 36415 COLL VENOUS BLD VENIPUNCTURE: CPT

## 2024-06-29 PROCEDURE — 81003 URINALYSIS AUTO W/O SCOPE: CPT

## 2024-06-30 LAB — BACTERIA UR CULT: NORMAL

## 2024-07-01 DIAGNOSIS — F41.1 GENERALIZED ANXIETY DISORDER: ICD-10-CM

## 2024-07-01 RX ORDER — CLONAZEPAM 0.5 MG/1
0.5 TABLET ORAL 2 TIMES DAILY PRN
Qty: 14 TABLET | Refills: 0 | Status: SHIPPED | OUTPATIENT
Start: 2024-07-01 | End: 2024-07-08

## 2024-07-03 ENCOUNTER — HOSPITAL ENCOUNTER (OUTPATIENT)
Age: 49
Setting detail: SURGERY ADMIT
Discharge: HOME | End: 2024-07-03
Attending: INTERNAL MEDICINE | Admitting: INTERNAL MEDICINE
Payer: MEDICARE

## 2024-07-03 ENCOUNTER — APPOINTMENT (OUTPATIENT)
Age: 49
End: 2024-07-03
Attending: INTERNAL MEDICINE
Payer: MEDICARE

## 2024-07-03 VITALS
RESPIRATION RATE: 18 BRPM | DIASTOLIC BLOOD PRESSURE: 88 MMHG | HEART RATE: 63 BPM | HEIGHT: 69 IN | OXYGEN SATURATION: 97 % | SYSTOLIC BLOOD PRESSURE: 135 MMHG | BODY MASS INDEX: 34.07 KG/M2 | WEIGHT: 230 LBS | TEMPERATURE: 98 F

## 2024-07-03 DIAGNOSIS — Q21.12 PATENT FORAMEN OVALE: Primary | ICD-10-CM

## 2024-07-03 DIAGNOSIS — Q21.10 ASD (ATRIAL SEPTAL DEFECT): ICD-10-CM

## 2024-07-03 DIAGNOSIS — Q21.20 ATRIOVENTRICULAR SEPTAL DEFECT, UNSPECIFIED AS TO PARTIAL OR COMPLETE: ICD-10-CM

## 2024-07-03 LAB
ECHO AR MAX VEL PISA: 4.6 M/S
ECHO AV AREA PEAK VELOCITY: 1.9 CM2
ECHO AV AREA VTI: 1.9 CM2
ECHO AV AREA/BSA PEAK VELOCITY: 0.9 CM2/M2
ECHO AV AREA/BSA VTI: 0.9 CM2/M2
ECHO AV MEAN GRADIENT: 6 MMHG
ECHO AV MEAN VELOCITY: 1.2 M/S
ECHO AV PEAK GRADIENT: 12 MMHG
ECHO AV PEAK VELOCITY: 1.7 M/S
ECHO AV REGURGITANT PHT: 566 MS
ECHO AV VELOCITY RATIO: 0.59
ECHO AV VTI: 37.4 CM
ECHO BSA: 2.25 M2
ECHO BSA: 2.25 M2
ECHO EST RA PRESSURE: 3 MMHG
ECHO IVC PROX: 1.5 CM
ECHO LA AREA 2C: 19.3 CM2
ECHO LA AREA 4C: 22 CM2
ECHO LA MAJOR AXIS: 5.8 CM
ECHO LA MINOR AXIS: 6.1 CM
ECHO LA VOL BP: 58 ML (ref 22–52)
ECHO LA VOL MOD A2C: 50 ML (ref 22–52)
ECHO LA VOL MOD A4C: 65 ML (ref 22–52)
ECHO LA VOL/BSA BIPLANE: 26 ML/M2 (ref 16–34)
ECHO LA VOLUME INDEX MOD A2C: 23 ML/M2 (ref 16–34)
ECHO LA VOLUME INDEX MOD A4C: 30 ML/M2 (ref 16–34)
ECHO LV E' LATERAL VELOCITY: 8 CM/S
ECHO LV E' SEPTAL VELOCITY: 6 CM/S
ECHO LV EDV A2C: 154 ML
ECHO LV EDV A4C: 117 ML
ECHO LV EDV INDEX A4C: 53 ML/M2
ECHO LV EDV NDEX A2C: 70 ML/M2
ECHO LV EJECTION FRACTION A2C: 59 %
ECHO LV EJECTION FRACTION A4C: 61 %
ECHO LV EJECTION FRACTION BIPLANE: 61 % (ref 55–100)
ECHO LV ESV A2C: 63 ML
ECHO LV ESV A4C: 46 ML
ECHO LV ESV INDEX A2C: 29 ML/M2
ECHO LV ESV INDEX A4C: 21 ML/M2
ECHO LV FRACTIONAL SHORTENING: 36 % (ref 28–44)
ECHO LV INTERNAL DIMENSION DIASTOLE INDEX: 2.15 CM/M2
ECHO LV INTERNAL DIMENSION DIASTOLIC: 4.7 CM (ref 3.9–5.3)
ECHO LV INTERNAL DIMENSION SYSTOLIC INDEX: 1.37 CM/M2
ECHO LV INTERNAL DIMENSION SYSTOLIC: 3 CM
ECHO LV IVSD: 1 CM (ref 0.6–0.9)
ECHO LV MASS 2D: 164.5 G (ref 67–162)
ECHO LV MASS INDEX 2D: 75.1 G/M2 (ref 43–95)
ECHO LV POSTERIOR WALL DIASTOLIC: 1 CM (ref 0.6–0.9)
ECHO LV RELATIVE WALL THICKNESS RATIO: 0.43
ECHO LVOT AREA: 3.1 CM2
ECHO LVOT AV VTI INDEX: 0.6
ECHO LVOT DIAM: 2 CM
ECHO LVOT MEAN GRADIENT: 2 MMHG
ECHO LVOT PEAK GRADIENT: 4 MMHG
ECHO LVOT PEAK VELOCITY: 1 M/S
ECHO LVOT STROKE VOLUME INDEX: 32.1 ML/M2
ECHO LVOT SV: 70.3 ML
ECHO LVOT VTI: 22.4 CM
ECHO MV A VELOCITY: 1.09 M/S
ECHO MV E DECELERATION TIME (DT): 292 MS
ECHO MV E VELOCITY: 0.91 M/S
ECHO MV E/A RATIO: 0.83
ECHO MV E/E' LATERAL: 11.38
ECHO MV E/E' RATIO (AVERAGED): 13.27
ECHO MV E/E' SEPTAL: 15.17
ECHO RIGHT VENTRICULAR SYSTOLIC PRESSURE (RVSP): 28 MMHG
ECHO TV REGURGITANT MAX VELOCITY: 2.52 M/S
ECHO TV REGURGITANT PEAK GRADIENT: 25 MMHG
EKG ATRIAL RATE: 74 BPM
EKG DIAGNOSIS: NORMAL
EKG P AXIS: 64 DEGREES
EKG P-R INTERVAL: 156 MS
EKG Q-T INTERVAL: 424 MS
EKG QRS DURATION: 88 MS
EKG QTC CALCULATION (BAZETT): 470 MS
EKG R AXIS: 61 DEGREES
EKG T AXIS: 27 DEGREES
EKG VENTRICULAR RATE: 74 BPM
POC ACT LR: 238 SEC
POC ACT LR: 280 SEC
POC ACT LR: 320 SEC

## 2024-07-03 PROCEDURE — 6360000002 HC RX W HCPCS: Performed by: INTERNAL MEDICINE

## 2024-07-03 PROCEDURE — 93325 DOPPLER ECHO COLOR FLOW MAPG: CPT | Performed by: INTERNAL MEDICINE

## 2024-07-03 PROCEDURE — 93321 DOPPLER ECHO F-UP/LMTD STD: CPT | Performed by: INTERNAL MEDICINE

## 2024-07-03 PROCEDURE — 93308 TTE F-UP OR LMTD: CPT

## 2024-07-03 PROCEDURE — 2720000010 HC SURG SUPPLY STERILE: Performed by: INTERNAL MEDICINE

## 2024-07-03 PROCEDURE — 2580000003 HC RX 258

## 2024-07-03 PROCEDURE — 7100000011 HC PHASE II RECOVERY - ADDTL 15 MIN: Performed by: INTERNAL MEDICINE

## 2024-07-03 PROCEDURE — 93580 TRANSCATH CLOSURE OF ASD: CPT

## 2024-07-03 PROCEDURE — 93308 TTE F-UP OR LMTD: CPT | Performed by: INTERNAL MEDICINE

## 2024-07-03 PROCEDURE — C1894 INTRO/SHEATH, NON-LASER: HCPCS | Performed by: INTERNAL MEDICINE

## 2024-07-03 PROCEDURE — 6360000002 HC RX W HCPCS

## 2024-07-03 PROCEDURE — 2709999900 HC NON-CHARGEABLE SUPPLY: Performed by: INTERNAL MEDICINE

## 2024-07-03 PROCEDURE — 2500000003 HC RX 250 WO HCPCS

## 2024-07-03 PROCEDURE — C1769 GUIDE WIRE: HCPCS | Performed by: INTERNAL MEDICINE

## 2024-07-03 PROCEDURE — C1759 CATH, INTRA ECHOCARDIOGRAPHY: HCPCS | Performed by: INTERNAL MEDICINE

## 2024-07-03 PROCEDURE — 7100000010 HC PHASE II RECOVERY - FIRST 15 MIN: Performed by: INTERNAL MEDICINE

## 2024-07-03 PROCEDURE — C1817 SEPTAL DEFECT IMP SYS: HCPCS | Performed by: INTERNAL MEDICINE

## 2024-07-03 PROCEDURE — 99152 MOD SED SAME PHYS/QHP 5/>YRS: CPT | Performed by: INTERNAL MEDICINE

## 2024-07-03 PROCEDURE — 99153 MOD SED SAME PHYS/QHP EA: CPT | Performed by: INTERNAL MEDICINE

## 2024-07-03 PROCEDURE — 85347 COAGULATION TIME ACTIVATED: CPT

## 2024-07-03 RX ORDER — SODIUM CHLORIDE 0.9 % (FLUSH) 0.9 %
5-40 SYRINGE (ML) INJECTION PRN
Status: DISCONTINUED | OUTPATIENT
Start: 2024-07-03 | End: 2024-07-03 | Stop reason: HOSPADM

## 2024-07-03 RX ORDER — MIDAZOLAM HYDROCHLORIDE 1 MG/ML
INJECTION INTRAMUSCULAR; INTRAVENOUS PRN
Status: DISCONTINUED | OUTPATIENT
Start: 2024-07-03 | End: 2024-07-03 | Stop reason: HOSPADM

## 2024-07-03 RX ORDER — FENTANYL CITRATE 50 UG/ML
INJECTION, SOLUTION INTRAMUSCULAR; INTRAVENOUS PRN
Status: DISCONTINUED | OUTPATIENT
Start: 2024-07-03 | End: 2024-07-03 | Stop reason: HOSPADM

## 2024-07-03 RX ORDER — PROTAMINE SULFATE 10 MG/ML
INJECTION, SOLUTION INTRAVENOUS PRN
Status: DISCONTINUED | OUTPATIENT
Start: 2024-07-03 | End: 2024-07-03 | Stop reason: HOSPADM

## 2024-07-03 RX ORDER — CEFAZOLIN SODIUM 1 G/3ML
INJECTION, POWDER, FOR SOLUTION INTRAMUSCULAR; INTRAVENOUS PRN
Status: DISCONTINUED | OUTPATIENT
Start: 2024-07-03 | End: 2024-07-03 | Stop reason: HOSPADM

## 2024-07-03 RX ORDER — HEPARIN SODIUM 1000 [USP'U]/ML
INJECTION, SOLUTION INTRAVENOUS; SUBCUTANEOUS PRN
Status: DISCONTINUED | OUTPATIENT
Start: 2024-07-03 | End: 2024-07-03 | Stop reason: HOSPADM

## 2024-07-03 RX ORDER — SODIUM CHLORIDE 0.9 % (FLUSH) 0.9 %
5-40 SYRINGE (ML) INJECTION EVERY 12 HOURS SCHEDULED
Status: DISCONTINUED | OUTPATIENT
Start: 2024-07-03 | End: 2024-07-03 | Stop reason: HOSPADM

## 2024-07-03 RX ORDER — SODIUM CHLORIDE 9 MG/ML
INJECTION, SOLUTION INTRAVENOUS PRN
Status: DISCONTINUED | OUTPATIENT
Start: 2024-07-03 | End: 2024-07-03 | Stop reason: HOSPADM

## 2024-07-03 RX ORDER — ACETAMINOPHEN 325 MG/1
650 TABLET ORAL EVERY 4 HOURS PRN
Status: DISCONTINUED | OUTPATIENT
Start: 2024-07-03 | End: 2024-07-03 | Stop reason: HOSPADM

## 2024-07-03 NOTE — H&P
[facility]  H+P  Consult  OP Visit  FU Visit   CC/HPI   CC New patient visit for PFO closure. .   HPI 48 y.o., female admitted for PFO closure.  Bidirectional shunt by echo and mri with hx ischemic stroke in 11/23 and remote hx dvt.     HISTORY/ALLERGY/ROS   MEDHx  has a past medical history of Anxiety, DVT (deep vein thrombosis) in pregnancy, Hypertension, and Low blood potassium.   SURGHx  has a past surgical history that includes Dilation and curettage of uterus; Ovary removal; Tubal ligation; Hysterectomy (5/28/15); Abdominal exploration surgery (8/12/15); and ep device procedure (N/A, 5/14/2024).   SOCHx  reports that she has quit smoking. Her smoking use included cigarettes. She has a 3.0 pack-year smoking history. She uses smokeless tobacco. She reports current alcohol use. She reports that she does not use drugs.   FAMHx family history includes Diabetes in her father; Heart Disease in her father; High Blood Pressure in her father.   ALLERG Amoxicillin-pot clavulanate, Bee venom, Hydrochlorothiazide, and Amoxicillin   ROS Full ROS obtained and negative except as mentioned in HPI   MEDICATIONS   Medications reviewed in Epic.   PHYSICAL EXAM   Vitals BP (!) 162/78   Pulse 75   Temp 98 °F (36.7 °C)   Resp 18   Ht 1.753 m (5' 9\")   Wt 104.3 kg (230 lb)   LMP 04/11/2015 (Exact Date)   SpO2 99%   BMI 33.97 kg/m²    Gen Alert, coop, no distress Heart  RRR, no MRG   Head NC, AT, no abnorm Abd  Soft, NT, +BS, no mass, no OM   Eyes PER, conj/corn clear Ext  Ext nl, AT, no C/C/E   Nose Nares nl, no drain, NT Pulse 2+ and symmetric   Throat Lips, mucosa, tongue nl Skin Col/text/turg nl, no vis rash/les   Neck S/S, TM, NT, no bruit/JVD Psych Nl mood and affect   Lung CTA-B, unlabored, no DTP Lymph   No cervical or axillary LA   Ch wall NT, no deform Neuro  Nl gross M/S exam      ASSESSMENT TIME   More than 35 minutes spent reviewing patient chart (including but not limited to notes, labs, imaging and

## 2024-07-03 NOTE — PROGRESS NOTES
09:10-Patient returned to pre/post room #4- VSS. 2 right venous sheaths intact- dressing intact. Patient resting quietly in cart. Report received from LUC Gee. Patients friend at bedside. Patient denies needs at this time. Call light within reach- will monitor.    09:45- ACT drawn- 162. OK to remove sheaths.    09:50- Venous sheaths pulled via manual pressure and quick clot. Manual pressure held x15 minutes. Pressure dressing applied to right groin site- site WDL. Patient eating and drinking. VSS.    10:50- Echo at bedside. Bubble study completed. VSS. Discharge instructions reviewed in length with patient and friend- verbalize understanding. Patient given closure device card.     11:50- OK to discharge to home per /. Peripheral IV removed- tolerated well. Patient to front lobby via wheelchair.

## 2024-07-03 NOTE — DISCHARGE INSTRUCTIONS
PFO DISCHARGE INSTRUCTIONS:    Care of your puncture site:  Remove bandage 24 hours after the procedure.  May shower in 24 hours but do not sit in a bathtub/pool of water for 5 days or until the wound is healed.  Inspect the site daily and gently clean using soap and water while standing in the shower.  Dry thoroughly and apply a Band-Aid that covers the entire site. Do not apply powder or lotion.    Normal Observations:  Soreness or tenderness which may last one week.  Mild oozing from the incision site.  Possible bruising that could last 2 weeks.    Activity:  You may resume driving 24 hours following the procedure.  You may resume normal activity in 3 days or after the wound heals.  Avoid lifting more than 10 pounds for 3 days or until the wound heals.  Avoid strenuous exercise or activity for 1 week.    Nutrition:  Regular diet  Drink at least 8 to 10 glasses of decaffeinated, non-alcoholic fluid for the next 24 hours to flush the x-ray dye used for your angiogram out of your body.    Call your doctor immediately if your condition worsens, for any other concerns, for a follow-up appointment or if you experience any of the following:  Significant bleeding that does not stop after 10 minutes of applying firm pressure on the puncture site.  Increased swelling on the groin or leg.  Unusual pain, numbness, or tingling of the groin or down the leg.  Any signs of infection such as: redness, yellow drainage at the site, swelling or pain.

## 2024-07-08 ENCOUNTER — APPOINTMENT (OUTPATIENT)
Dept: CT IMAGING | Age: 49
End: 2024-07-08
Payer: MEDICARE

## 2024-07-08 ENCOUNTER — HOSPITAL ENCOUNTER (OUTPATIENT)
Age: 49
Setting detail: OBSERVATION
Discharge: HOME | End: 2024-07-10
Attending: EMERGENCY MEDICINE | Admitting: STUDENT IN AN ORGANIZED HEALTH CARE EDUCATION/TRAINING PROGRAM
Payer: MEDICARE

## 2024-07-08 ENCOUNTER — APPOINTMENT (OUTPATIENT)
Dept: GENERAL RADIOLOGY | Age: 49
End: 2024-07-08
Payer: MEDICARE

## 2024-07-08 DIAGNOSIS — I10 ESSENTIAL HYPERTENSION: ICD-10-CM

## 2024-07-08 DIAGNOSIS — G45.9 TRANSIENT CEREBRAL ISCHEMIA, UNSPECIFIED TYPE: ICD-10-CM

## 2024-07-08 DIAGNOSIS — I20.89 ANGINA AT REST (HCC): ICD-10-CM

## 2024-07-08 DIAGNOSIS — H53.8 BLURRY VISION: Primary | ICD-10-CM

## 2024-07-08 DIAGNOSIS — G45.1 TIA INVOLVING LEFT INTERNAL CAROTID ARTERY: ICD-10-CM

## 2024-07-08 LAB
ALBUMIN SERPL-MCNC: 4.4 G/DL (ref 3.4–5)
ALBUMIN/GLOB SERPL: 1.3 {RATIO} (ref 1.1–2.2)
ALP SERPL-CCNC: 111 U/L (ref 40–129)
ALT SERPL-CCNC: 17 U/L (ref 10–40)
ANION GAP SERPL CALCULATED.3IONS-SCNC: 18 MMOL/L (ref 3–16)
AST SERPL-CCNC: 20 U/L (ref 15–37)
BASOPHILS # BLD: 0.2 K/UL (ref 0–0.2)
BASOPHILS NFR BLD: 1.1 %
BILIRUB SERPL-MCNC: <0.2 MG/DL (ref 0–1)
BUN SERPL-MCNC: 8 MG/DL (ref 7–20)
CALCIUM SERPL-MCNC: 10.1 MG/DL (ref 8.3–10.6)
CHLORIDE SERPL-SCNC: 98 MMOL/L (ref 99–110)
CO2 SERPL-SCNC: 20 MMOL/L (ref 21–32)
CREAT SERPL-MCNC: 1.1 MG/DL (ref 0.6–1.1)
DEPRECATED RDW RBC AUTO: 14.7 % (ref 12.4–15.4)
EOSINOPHIL # BLD: 0.2 K/UL (ref 0–0.6)
EOSINOPHIL NFR BLD: 1.6 %
GFR SERPLBLD CREATININE-BSD FMLA CKD-EPI: 61 ML/MIN/{1.73_M2}
GLUCOSE BLD-MCNC: 120 MG/DL (ref 70–99)
GLUCOSE SERPL-MCNC: 103 MG/DL (ref 70–99)
HCT VFR BLD AUTO: 37.3 % (ref 36–48)
HGB BLD-MCNC: 13.2 G/DL (ref 12–16)
INR PPP: 0.99 (ref 0.85–1.15)
LYMPHOCYTES # BLD: 3.5 K/UL (ref 1–5.1)
LYMPHOCYTES NFR BLD: 22.8 %
MAGNESIUM SERPL-MCNC: 1.9 MG/DL (ref 1.8–2.4)
MCH RBC QN AUTO: 31.8 PG (ref 26–34)
MCHC RBC AUTO-ENTMCNC: 35.3 G/DL (ref 31–36)
MCV RBC AUTO: 90.2 FL (ref 80–100)
MONOCYTES # BLD: 1.2 K/UL (ref 0–1.3)
MONOCYTES NFR BLD: 8.1 %
NEUTROPHILS # BLD: 10.1 K/UL (ref 1.7–7.7)
NEUTROPHILS NFR BLD: 66.4 %
PERFORMED ON: ABNORMAL
PLATELET # BLD AUTO: 370 K/UL (ref 135–450)
PMV BLD AUTO: 7.3 FL (ref 5–10.5)
POTASSIUM SERPL-SCNC: 3.4 MMOL/L (ref 3.5–5.1)
PROT SERPL-MCNC: 7.8 G/DL (ref 6.4–8.2)
PROTHROMBIN TIME: 13.3 SEC (ref 11.9–14.9)
RBC # BLD AUTO: 4.13 M/UL (ref 4–5.2)
SODIUM SERPL-SCNC: 136 MMOL/L (ref 136–145)
TROPONIN, HIGH SENSITIVITY: <6 NG/L (ref 0–14)
WBC # BLD AUTO: 15.2 K/UL (ref 4–11)

## 2024-07-08 PROCEDURE — 6360000002 HC RX W HCPCS: Performed by: PHYSICIAN ASSISTANT

## 2024-07-08 PROCEDURE — 80053 COMPREHEN METABOLIC PANEL: CPT

## 2024-07-08 PROCEDURE — 93005 ELECTROCARDIOGRAM TRACING: CPT | Performed by: EMERGENCY MEDICINE

## 2024-07-08 PROCEDURE — 6370000000 HC RX 637 (ALT 250 FOR IP): Performed by: EMERGENCY MEDICINE

## 2024-07-08 PROCEDURE — 96374 THER/PROPH/DIAG INJ IV PUSH: CPT

## 2024-07-08 PROCEDURE — 70450 CT HEAD/BRAIN W/O DYE: CPT

## 2024-07-08 PROCEDURE — 6360000004 HC RX CONTRAST MEDICATION: Performed by: EMERGENCY MEDICINE

## 2024-07-08 PROCEDURE — 83735 ASSAY OF MAGNESIUM: CPT

## 2024-07-08 PROCEDURE — 70498 CT ANGIOGRAPHY NECK: CPT

## 2024-07-08 PROCEDURE — 85025 COMPLETE CBC W/AUTO DIFF WBC: CPT

## 2024-07-08 PROCEDURE — 85610 PROTHROMBIN TIME: CPT

## 2024-07-08 PROCEDURE — 71045 X-RAY EXAM CHEST 1 VIEW: CPT

## 2024-07-08 PROCEDURE — 99285 EMERGENCY DEPT VISIT HI MDM: CPT

## 2024-07-08 PROCEDURE — 84484 ASSAY OF TROPONIN QUANT: CPT

## 2024-07-08 PROCEDURE — G0378 HOSPITAL OBSERVATION PER HR: HCPCS

## 2024-07-08 RX ORDER — MECLIZINE HCL 12.5 MG/1
25 TABLET ORAL ONCE
Status: COMPLETED | OUTPATIENT
Start: 2024-07-08 | End: 2024-07-08

## 2024-07-08 RX ORDER — CLONAZEPAM 0.5 MG/1
0.5 TABLET ORAL 2 TIMES DAILY PRN
Status: DISCONTINUED | OUTPATIENT
Start: 2024-07-08 | End: 2024-07-10 | Stop reason: HOSPADM

## 2024-07-08 RX ORDER — IPRATROPIUM BROMIDE AND ALBUTEROL SULFATE 2.5; .5 MG/3ML; MG/3ML
1 SOLUTION RESPIRATORY (INHALATION) EVERY 6 HOURS PRN
Status: DISCONTINUED | OUTPATIENT
Start: 2024-07-08 | End: 2024-07-10 | Stop reason: HOSPADM

## 2024-07-08 RX ORDER — ATORVASTATIN CALCIUM 80 MG/1
80 TABLET, FILM COATED ORAL NIGHTLY
Status: DISCONTINUED | OUTPATIENT
Start: 2024-07-09 | End: 2024-07-10 | Stop reason: HOSPADM

## 2024-07-08 RX ORDER — ASPIRIN 81 MG/1
81 TABLET ORAL DAILY
Status: DISCONTINUED | OUTPATIENT
Start: 2024-07-09 | End: 2024-07-10 | Stop reason: HOSPADM

## 2024-07-08 RX ORDER — ACETAMINOPHEN 650 MG/1
650 SUPPOSITORY RECTAL EVERY 4 HOURS PRN
Status: DISCONTINUED | OUTPATIENT
Start: 2024-07-08 | End: 2024-07-10

## 2024-07-08 RX ORDER — ACETAMINOPHEN 325 MG/1
650 TABLET ORAL ONCE
Status: COMPLETED | OUTPATIENT
Start: 2024-07-08 | End: 2024-07-08

## 2024-07-08 RX ORDER — SODIUM CHLORIDE 0.9 % (FLUSH) 0.9 %
10 SYRINGE (ML) INJECTION PRN
Status: DISCONTINUED | OUTPATIENT
Start: 2024-07-08 | End: 2024-07-10 | Stop reason: HOSPADM

## 2024-07-08 RX ORDER — CETIRIZINE HYDROCHLORIDE 10 MG/1
10 TABLET ORAL DAILY
Status: DISCONTINUED | OUTPATIENT
Start: 2024-07-09 | End: 2024-07-09

## 2024-07-08 RX ORDER — SENNOSIDES A AND B 8.6 MG/1
1 TABLET, FILM COATED ORAL DAILY PRN
Status: DISCONTINUED | OUTPATIENT
Start: 2024-07-08 | End: 2024-07-10 | Stop reason: HOSPADM

## 2024-07-08 RX ORDER — ENOXAPARIN SODIUM 100 MG/ML
40 INJECTION SUBCUTANEOUS DAILY
Status: DISCONTINUED | OUTPATIENT
Start: 2024-07-09 | End: 2024-07-10

## 2024-07-08 RX ORDER — DESVENLAFAXINE SUCCINATE 50 MG/1
50 TABLET, EXTENDED RELEASE ORAL
Status: DISCONTINUED | OUTPATIENT
Start: 2024-07-09 | End: 2024-07-10 | Stop reason: HOSPADM

## 2024-07-08 RX ORDER — ACETAMINOPHEN 325 MG/1
650 TABLET ORAL EVERY 4 HOURS PRN
Status: DISCONTINUED | OUTPATIENT
Start: 2024-07-08 | End: 2024-07-10

## 2024-07-08 RX ORDER — KETOROLAC TROMETHAMINE 30 MG/ML
30 INJECTION, SOLUTION INTRAMUSCULAR; INTRAVENOUS EVERY 6 HOURS PRN
Status: DISCONTINUED | OUTPATIENT
Start: 2024-07-08 | End: 2024-07-10

## 2024-07-08 RX ORDER — SODIUM CHLORIDE 9 MG/ML
INJECTION, SOLUTION INTRAVENOUS PRN
Status: DISCONTINUED | OUTPATIENT
Start: 2024-07-08 | End: 2024-07-10 | Stop reason: HOSPADM

## 2024-07-08 RX ORDER — CLOPIDOGREL BISULFATE 75 MG/1
75 TABLET ORAL DAILY
Status: DISCONTINUED | OUTPATIENT
Start: 2024-07-09 | End: 2024-07-10

## 2024-07-08 RX ORDER — ONDANSETRON 2 MG/ML
4 INJECTION INTRAMUSCULAR; INTRAVENOUS EVERY 6 HOURS PRN
Status: DISCONTINUED | OUTPATIENT
Start: 2024-07-08 | End: 2024-07-10 | Stop reason: HOSPADM

## 2024-07-08 RX ORDER — LABETALOL HYDROCHLORIDE 5 MG/ML
10 INJECTION, SOLUTION INTRAVENOUS EVERY 10 MIN PRN
Status: DISCONTINUED | OUTPATIENT
Start: 2024-07-08 | End: 2024-07-10 | Stop reason: HOSPADM

## 2024-07-08 RX ORDER — PANTOPRAZOLE SODIUM 40 MG/1
40 TABLET, DELAYED RELEASE ORAL
Status: DISCONTINUED | OUTPATIENT
Start: 2024-07-09 | End: 2024-07-10 | Stop reason: HOSPADM

## 2024-07-08 RX ORDER — SODIUM CHLORIDE 0.9 % (FLUSH) 0.9 %
10 SYRINGE (ML) INJECTION EVERY 12 HOURS SCHEDULED
Status: DISCONTINUED | OUTPATIENT
Start: 2024-07-09 | End: 2024-07-10 | Stop reason: HOSPADM

## 2024-07-08 RX ADMIN — IOPAMIDOL 75 ML: 755 INJECTION, SOLUTION INTRAVENOUS at 19:51

## 2024-07-08 RX ADMIN — KETOROLAC TROMETHAMINE 30 MG: 30 INJECTION, SOLUTION INTRAMUSCULAR at 23:28

## 2024-07-08 RX ADMIN — MECLIZINE 25 MG: 12.5 TABLET ORAL at 21:09

## 2024-07-08 RX ADMIN — ACETAMINOPHEN 650 MG: 325 TABLET ORAL at 21:09

## 2024-07-08 ASSESSMENT — PAIN - FUNCTIONAL ASSESSMENT: PAIN_FUNCTIONAL_ASSESSMENT: 0-10

## 2024-07-08 ASSESSMENT — LIFESTYLE VARIABLES
HOW OFTEN DO YOU HAVE A DRINK CONTAINING ALCOHOL: NEVER
HOW MANY STANDARD DRINKS CONTAINING ALCOHOL DO YOU HAVE ON A TYPICAL DAY: PATIENT DOES NOT DRINK

## 2024-07-08 ASSESSMENT — PAIN SCALES - GENERAL
PAINLEVEL_OUTOF10: 8
PAINLEVEL_OUTOF10: 5
PAINLEVEL_OUTOF10: 8
PAINLEVEL_OUTOF10: 7
PAINLEVEL_OUTOF10: 8

## 2024-07-08 ASSESSMENT — PAIN DESCRIPTION - DESCRIPTORS
DESCRIPTORS: ACHING
DESCRIPTORS: ACHING

## 2024-07-08 ASSESSMENT — PAIN DESCRIPTION - LOCATION
LOCATION: EYE
LOCATION: GENERALIZED
LOCATION: HEAD
LOCATION: EYE

## 2024-07-08 ASSESSMENT — PAIN DESCRIPTION - ORIENTATION: ORIENTATION: RIGHT

## 2024-07-09 ENCOUNTER — APPOINTMENT (OUTPATIENT)
Dept: MRI IMAGING | Age: 49
End: 2024-07-09
Payer: MEDICARE

## 2024-07-09 PROBLEM — G43.809 VARIANTS OF MIGRAINE: Status: ACTIVE | Noted: 2024-07-09

## 2024-07-09 PROBLEM — G45.1 TIA INVOLVING LEFT INTERNAL CAROTID ARTERY: Status: ACTIVE | Noted: 2024-07-09

## 2024-07-09 LAB
ANION GAP SERPL CALCULATED.3IONS-SCNC: 14 MMOL/L (ref 3–16)
BUN SERPL-MCNC: 8 MG/DL (ref 7–20)
CALCIUM SERPL-MCNC: 10 MG/DL (ref 8.3–10.6)
CHLORIDE SERPL-SCNC: 105 MMOL/L (ref 99–110)
CHOLEST SERPL-MCNC: 148 MG/DL (ref 0–199)
CK SERPL-CCNC: 88 U/L (ref 26–192)
CO2 SERPL-SCNC: 22 MMOL/L (ref 21–32)
CREAT SERPL-MCNC: 1 MG/DL (ref 0.6–1.1)
CRP SERPL-MCNC: 18.2 MG/L (ref 0–5.1)
DEPRECATED RDW RBC AUTO: 14.6 % (ref 12.4–15.4)
EKG ATRIAL RATE: 67 BPM
EKG DIAGNOSIS: NORMAL
EKG P AXIS: 60 DEGREES
EKG P-R INTERVAL: 148 MS
EKG Q-T INTERVAL: 410 MS
EKG QRS DURATION: 92 MS
EKG QTC CALCULATION (BAZETT): 433 MS
EKG R AXIS: 58 DEGREES
EKG T AXIS: 59 DEGREES
EKG VENTRICULAR RATE: 67 BPM
ERYTHROCYTE [SEDIMENTATION RATE] IN BLOOD BY WESTERGREN METHOD: 27 MM/HR (ref 0–20)
FLUAV RNA RESP QL NAA+PROBE: NOT DETECTED
FLUBV RNA RESP QL NAA+PROBE: NOT DETECTED
GFR SERPLBLD CREATININE-BSD FMLA CKD-EPI: 69 ML/MIN/{1.73_M2}
GLUCOSE SERPL-MCNC: 96 MG/DL (ref 70–99)
HCT VFR BLD AUTO: 34.8 % (ref 36–48)
HDLC SERPL-MCNC: 36 MG/DL (ref 40–60)
HGB BLD-MCNC: 12.2 G/DL (ref 12–16)
LDLC SERPL CALC-MCNC: 58 MG/DL
MAGNESIUM SERPL-MCNC: 2 MG/DL (ref 1.8–2.4)
MCH RBC QN AUTO: 31.5 PG (ref 26–34)
MCHC RBC AUTO-ENTMCNC: 35.1 G/DL (ref 31–36)
MCV RBC AUTO: 89.9 FL (ref 80–100)
PLATELET # BLD AUTO: 326 K/UL (ref 135–450)
PMV BLD AUTO: 7.1 FL (ref 5–10.5)
POTASSIUM SERPL-SCNC: 3.3 MMOL/L (ref 3.5–5.1)
RBC # BLD AUTO: 3.87 M/UL (ref 4–5.2)
SARS-COV-2 RNA RESP QL NAA+PROBE: NOT DETECTED
SODIUM SERPL-SCNC: 141 MMOL/L (ref 136–145)
TRIGL SERPL-MCNC: 268 MG/DL (ref 0–150)
VLDLC SERPL CALC-MCNC: 54 MG/DL
WBC # BLD AUTO: 10.9 K/UL (ref 4–11)

## 2024-07-09 PROCEDURE — 87636 SARSCOV2 & INF A&B AMP PRB: CPT

## 2024-07-09 PROCEDURE — APPNB30 APP NON BILLABLE TIME 0-30 MINS

## 2024-07-09 PROCEDURE — 99223 1ST HOSP IP/OBS HIGH 75: CPT | Performed by: PSYCHIATRY & NEUROLOGY

## 2024-07-09 PROCEDURE — 85652 RBC SED RATE AUTOMATED: CPT

## 2024-07-09 PROCEDURE — 97161 PT EVAL LOW COMPLEX 20 MIN: CPT

## 2024-07-09 PROCEDURE — 93010 ELECTROCARDIOGRAM REPORT: CPT | Performed by: INTERNAL MEDICINE

## 2024-07-09 PROCEDURE — 70551 MRI BRAIN STEM W/O DYE: CPT

## 2024-07-09 PROCEDURE — G0378 HOSPITAL OBSERVATION PER HR: HCPCS

## 2024-07-09 PROCEDURE — 80048 BASIC METABOLIC PNL TOTAL CA: CPT

## 2024-07-09 PROCEDURE — 6370000000 HC RX 637 (ALT 250 FOR IP): Performed by: PHYSICIAN ASSISTANT

## 2024-07-09 PROCEDURE — 96376 TX/PRO/DX INJ SAME DRUG ADON: CPT

## 2024-07-09 PROCEDURE — 96375 TX/PRO/DX INJ NEW DRUG ADDON: CPT

## 2024-07-09 PROCEDURE — APPSS60 APP SPLIT SHARED TIME 46-60 MINUTES

## 2024-07-09 PROCEDURE — 2580000003 HC RX 258: Performed by: PHYSICIAN ASSISTANT

## 2024-07-09 PROCEDURE — 6370000000 HC RX 637 (ALT 250 FOR IP): Performed by: STUDENT IN AN ORGANIZED HEALTH CARE EDUCATION/TRAINING PROGRAM

## 2024-07-09 PROCEDURE — 36415 COLL VENOUS BLD VENIPUNCTURE: CPT

## 2024-07-09 PROCEDURE — 80061 LIPID PANEL: CPT

## 2024-07-09 PROCEDURE — 82550 ASSAY OF CK (CPK): CPT

## 2024-07-09 PROCEDURE — 85027 COMPLETE CBC AUTOMATED: CPT

## 2024-07-09 PROCEDURE — 97530 THERAPEUTIC ACTIVITIES: CPT

## 2024-07-09 PROCEDURE — 97116 GAIT TRAINING THERAPY: CPT

## 2024-07-09 PROCEDURE — 6360000002 HC RX W HCPCS: Performed by: PHYSICIAN ASSISTANT

## 2024-07-09 PROCEDURE — 83735 ASSAY OF MAGNESIUM: CPT

## 2024-07-09 PROCEDURE — 86140 C-REACTIVE PROTEIN: CPT

## 2024-07-09 PROCEDURE — 96372 THER/PROPH/DIAG INJ SC/IM: CPT

## 2024-07-09 RX ORDER — TRAMADOL HYDROCHLORIDE 50 MG/1
50 TABLET ORAL EVERY 6 HOURS PRN
Status: DISCONTINUED | OUTPATIENT
Start: 2024-07-09 | End: 2024-07-10 | Stop reason: HOSPADM

## 2024-07-09 RX ORDER — POTASSIUM CHLORIDE 20 MEQ/1
40 TABLET, EXTENDED RELEASE ORAL ONCE
Status: COMPLETED | OUTPATIENT
Start: 2024-07-09 | End: 2024-07-09

## 2024-07-09 RX ADMIN — TRAMADOL HYDROCHLORIDE 50 MG: 50 TABLET ORAL at 20:18

## 2024-07-09 RX ADMIN — KETOROLAC TROMETHAMINE 30 MG: 30 INJECTION, SOLUTION INTRAMUSCULAR at 12:53

## 2024-07-09 RX ADMIN — CLONAZEPAM 0.5 MG: 0.5 TABLET ORAL at 20:18

## 2024-07-09 RX ADMIN — BREXPIPRAZOLE 0.5 MG: 0.5 TABLET ORAL at 00:26

## 2024-07-09 RX ADMIN — ENOXAPARIN SODIUM 40 MG: 100 INJECTION SUBCUTANEOUS at 09:14

## 2024-07-09 RX ADMIN — ATORVASTATIN CALCIUM 80 MG: 80 TABLET, FILM COATED ORAL at 20:18

## 2024-07-09 RX ADMIN — SODIUM CHLORIDE, PRESERVATIVE FREE 10 ML: 5 INJECTION INTRAVENOUS at 09:15

## 2024-07-09 RX ADMIN — PANTOPRAZOLE SODIUM 40 MG: 40 TABLET, DELAYED RELEASE ORAL at 05:07

## 2024-07-09 RX ADMIN — CLOPIDOGREL BISULFATE 75 MG: 75 TABLET ORAL at 09:14

## 2024-07-09 RX ADMIN — SODIUM CHLORIDE, PRESERVATIVE FREE 10 ML: 5 INJECTION INTRAVENOUS at 20:19

## 2024-07-09 RX ADMIN — CLONAZEPAM 0.5 MG: 0.5 TABLET ORAL at 00:26

## 2024-07-09 RX ADMIN — DESVENLAFAXINE SUCCINATE 50 MG: 50 TABLET, EXTENDED RELEASE ORAL at 09:14

## 2024-07-09 RX ADMIN — POTASSIUM CHLORIDE 40 MEQ: 1500 TABLET, EXTENDED RELEASE ORAL at 09:14

## 2024-07-09 RX ADMIN — ASPIRIN 81 MG: 81 TABLET, COATED ORAL at 09:14

## 2024-07-09 RX ADMIN — ATORVASTATIN CALCIUM 80 MG: 80 TABLET, FILM COATED ORAL at 00:26

## 2024-07-09 RX ADMIN — SODIUM CHLORIDE 1 MG: 9 INJECTION INTRAMUSCULAR; INTRAVENOUS; SUBCUTANEOUS at 06:52

## 2024-07-09 RX ADMIN — BREXPIPRAZOLE 0.5 MG: 0.5 TABLET ORAL at 09:25

## 2024-07-09 RX ADMIN — BREXPIPRAZOLE 0.5 MG: 0.5 TABLET ORAL at 20:18

## 2024-07-09 ASSESSMENT — PAIN SCALES - GENERAL
PAINLEVEL_OUTOF10: 6
PAINLEVEL_OUTOF10: 8

## 2024-07-09 ASSESSMENT — PAIN DESCRIPTION - DESCRIPTORS
DESCRIPTORS: ACHING
DESCRIPTORS: ACHING

## 2024-07-09 ASSESSMENT — PAIN DESCRIPTION - LOCATION
LOCATION: GENERALIZED
LOCATION: GENERALIZED

## 2024-07-09 ASSESSMENT — PAIN - FUNCTIONAL ASSESSMENT: PAIN_FUNCTIONAL_ASSESSMENT: ACTIVITIES ARE NOT PREVENTED

## 2024-07-09 NOTE — PLAN OF CARE
Problem: Pain  Goal: Verbalizes/displays adequate comfort level or baseline comfort level  7/9/2024 0954 by Marianela Scott  Outcome: Progressing  7/9/2024 0105 by Jeromy Jama RN  Outcome: Progressing     Problem: Discharge Planning  Goal: Discharge to home or other facility with appropriate resources  7/9/2024 0954 by Marianela Scott  Outcome: Progressing  7/9/2024 0105 by Jeromy Jama, RN  Outcome: Progressing     Problem: ABCDS Injury Assessment  Goal: Absence of physical injury  Outcome: Progressing

## 2024-07-09 NOTE — PROGRESS NOTES
Pt c/o 9/10 throbbing, generalized pain. Pt reported this pain to both this RN and physical therapist. Pt reports this pain has occurred since her surgery. Pt refusing toradol for pain, pt stated it does not work. Notified hospitalist.

## 2024-07-09 NOTE — RT PROTOCOL NOTE
RT Inhaler-Nebulizer Bronchodilator Protocol Note    There is a bronchodilator order in the chart from a provider indicating to follow the RT Bronchodilator Protocol and there is an “Initiate RT Inhaler-Nebulizer Bronchodilator Protocol” order as well (see protocol at bottom of note).    CXR Findings:  XR CHEST PORTABLE    Result Date: 7/8/2024  No radiographic evidence of acute pulmonary disease.       The findings from the last RT Protocol Assessment were as follows:   History Pulmonary Disease: Smoker 15 pack years or more  Respiratory Pattern: Regular pattern and RR 12-20 bpm  Breath Sounds: Slightly diminished and/or crackles  Cough: Strong, spontaneous, non-productive  Indication for Bronchodilator Therapy:    Bronchodilator Assessment Score: 3    Aerosolized bronchodilator medication orders have been revised according to the RT Inhaler-Nebulizer Bronchodilator Protocol below.    Respiratory Therapist to perform RT Therapy Protocol Assessment initially then follow the protocol.  Repeat RT Therapy Protocol Assessment PRN for score 0-3 or on second treatment, BID, and PRN for scores above 3.    No Indications - adjust the frequency to every 6 hours PRN wheezing or bronchospasm, if no treatments needed after 48 hours then discontinue using Per Protocol order mode.     If indication present, adjust the RT bronchodilator orders based on the Bronchodilator Assessment Score as indicated below.  Use Inhaler orders unless patient has one or more of the following: on home nebulizer, not able to hold breath for 10 seconds, is not alert and oriented, cannot activate and use MDI correctly, or respiratory rate 25 breaths per minute or more, then use the equivalent nebulizer order(s) with same Frequency and PRN reasons based on the score.  If a patient is on this medication at home then do not decrease Frequency below that used at home.    0-3 - enter or revise RT bronchodilator order(s) to equivalent RT Bronchodilator order

## 2024-07-09 NOTE — ED NOTES
ED TO INPATIENT SBAR HANDOFF    Patient Name: Brittany Mccall   :  1975  48 y.o.   MRN:  [id number]  Preferred Name  Brittany  ED Room #:  ED-0012/12  Family/Caregiver Present no   Restraints no   Sitter no   Sepsis Risk Score      Situation  Code Status: Prior No additional code details.    Allergies: Amoxicillin-pot clavulanate, Bee venom, Hydrochlorothiazide, and Amoxicillin  Weight: No data found.  Arrived from: home  Chief Complaint:   Chief Complaint   Patient presents with    Loss of Vision     Patient arrives through triage with complaints of vision changes around 1400 today. Reports that she lost the bottom half of her vision in the left eye. Vision returned but then started having vision changes in the right eye around 1900. Patient reports pressure behind the right eye. Hx of a stroke with vision changes. Patient had ASD closure on Wednesday.      Hospital Problem/Diagnosis:  Active Problems:    * No active hospital problems. *  Resolved Problems:    * No resolved hospital problems. *    Imaging:   XR CHEST PORTABLE   Final Result   No radiographic evidence of acute pulmonary disease.         CTA HEAD NECK W CONTRAST   Final Result   1. No large vessel occlusion of the intracranial arteries.   2. Increased severe short segment stenosis of the left posterior cerebral   artery P2 segment.   3. No significant arterial stenosis in the neck.         CT HEAD WO CONTRAST   Final Result   Addendum (preliminary)    ADDENDUM:   Findings were conveyed to [facility] at 8:20 pm on 2024.         Final   No acute intracranial abnormality.      The findings were sent to the Radiology Results Communication Center at 8:11   pm on 2024 to be communicated to a licensed caregiver.           Abnormal labs:   Abnormal Labs Reviewed   CBC WITH AUTO DIFFERENTIAL - Abnormal; Notable for the following components:       Result Value    WBC 15.2 (*)     Neutrophils Absolute 10.1 (*)     All other components 
How does patient ambulate?   [x]Low Fall Risk (ambulates by themselves without support)  []Stand by assist   []Contact Guard   []Front wheel walker  []Wheelchair   []Steady  []Bed bound  []History of Lower Extremity Amputation  []Unknown, did not assess in the emergency department   How does patient take pills?  [x]Whole with Water  []Crushed in applesauce  []Crushed in pudding  []Other  []Unknown no oral medications were given in the ED  Is patient alert?   [x]Alert  []Drowsy but responds to voice  []Doesn't respond to voice but responds to painful stimuli  []Unresponsive  Is patient oriented?   [x]To person  [x]To place  [x]To time  [x]To situation  []Confused  []Agitated  []Follows commands  If patient is disoriented or from a Skill Nursing Facility has family been notified of admission?   []Yes   [x]No  Patient belongings?   []Cell phone  []Wallet   []Dentures  []Clothing  Any specific patient or family belongings/needs/dynamics?   -  Miscellaneous comments/pending orders?  ADMIT     If there are any additional questions please reach out to the Emergency Department.         
Patient oriented to room and ED throughput process.  Safety measures with ED bed locked in lowest position and call light in reach.  Patient educated on all orders, including any medications.  Patient educated on chief complaint/symptoms. Patient encouraged to ask questions regarding care, medications or treatment plan.  Patient aware of how to reach staff with questions/concerns.    
Pt brought over to room 12 from room 3  
Pt laying in bed eyes open, AAOx4, NAD, resp e/u on RA, bed locked lowest position, rails up x2, call light within reach, visitor at bedside, pt on BP cuff Pulse ox and Cardiac monitor, states feels generalized body aches and still seeing \"floaters\" in vision but overall denies any other complaints or needs at this time, results are back/reviewed and reportedly pt is planned for admission to hospital, states is on Plavix and baby aspirin daily last took yesterday since having a previous stroke back in November, had a PLO procedure for her heart this past Wednesday and since then states has been having this dizziness and pain and vision problems that got worse today PTA.  
Pt up ambulatory to the bathroom, leaning to the left side, stating that she feels like her head is spinning.  MD made aware and requested medicine for pain and dizziness.  
Report given to LUC Cordon on 3T all questions/concerns addressed states ready for pt.  
Report given to Malik CALLE, all questions answered during handoff report, VSS at this time and Pt awaiting disposition to be admitted to the hospital.  
Equal and normal pulses (carotid, femoral, dorsalis pedis)

## 2024-07-09 NOTE — CONSULTS
In patient Neurology consult        [facility] Neurology      MD Brittany Stewart  1975    Date of Service: 7/9/2024    Referring Physician: Mushtaq Cruz MD      Reason for the consult and CC: Acute visual disturbance    HPI:   The patient is a 48 y.o.  years old female with past medical history of hypertension, remote stroke, migraine headaches, tobacco abuse and other medical problems comes to the hospital with visual disturbance.  Degree severe duration minutes.  Description left visual loss affecting bottom half of visual field lasting approximately 10 minutes.  Patient also reported later in the day having a right visual loss with triangle shape and floaters lasting about 15 minutes.  Patient reports during this time she had a right-sided headache that was throbbing.  She denies having speech disturbance, extremity weakness, paresthesias during this time.  Patient does have history of remote left PCA territory stroke and had similar symptoms thus she came to the emergency room given concern for possible new stroke.  In the emergency room CT head showed no acute intracranial abnormality.  CTA head and neck showed left PCA stenosis.  Patient reports in the emergency room her symptoms had resolved and she was feeling much better.  Blood pressure in the emergency room was elevated 167/9080.  She was admitted to the hospital for further evaluation.  Today patient reports she is back to baseline.  She had MRI brain done this morning which did not show any acute stroke.  Off note patient did have PFO closure last Wednesday.  Patient is on aspirin and Plavix.  She does report smoking 1 pack a day currently.  Today she does report mild right-sided headache but denies dizziness, new vision changes, speech disturbance, extremity weakness, paresthesias.  Other review of systems unremarkable     Constitutional:   Vitals:    07/08/24 2330 07/08/24 2353 07/09/24 0500 07/09/24 0910   BP: (!) 174/105   movement    Impression:  Acute left visual disturbance of visual loss with a headache.  Possible differential could include TIA versus migraine aura.  Less likely optic neuritis  Remote left PCA stroke  Left PCA stenosis  Recent PFO closure  Hypertension, not controlled  Hyperlipidemia      Recommendation:  Continue aspirin and Plavix  Statin  Continue blood pressure monitor and control and resume home blood pressure medication  Nicotine patch  Ophthalmology consultation outpatient  Follow A1c and LDL  Smoking cessation  Stroke education provided  Pain control  Can be discharged from neurology once medically stable      Electronically signed by Edward Poon MD on 7/9/24 at 1:04 PM EDT       This dictation was generated by voice recognition computer software. Although all attempts are made to edit the dictation for accuracy, there may be errors in the  transcription that are not intended

## 2024-07-09 NOTE — H&P
[facility] Medicine History & Physical        Name:  Brittany Mccall /Age/Sex: 1975  (48 y.o. female)   MRN & CSN:  [id number] & [id number] Encounter Date/Time: 2024 11:46 PM EDT   Location:  [facility]3370/3370-01 PCP: Kamran Saleh, [facility]         CHIEF COMPLAINT:   Chief Complaint   Patient presents with    Loss of Vision     Patient arrives through triage with complaints of vision changes around 1400 today. Reports that she lost the bottom half of her vision in the left eye. Vision returned but then started having vision changes in the right eye around 1900. Patient reports pressure behind the right eye. Hx of a stroke with vision changes. Patient had ASD closure on Wednesday.          HISTORY OF PRESENT ILLNESS:      History from: patient  Limitations to history : None     Brittany Mccall is a 48 y.o. female who presented to the ED for evaluation with visual disturbances.  She reports she began having difficulty seeing with her left eye around 2 PM today and then started having difficulty seeing with her right eye around 7 PM.  She reports her vision has improved at this time and she believes it is back to baseline.  She reports she had similar symptoms with her previous stroke in November of last year.  She reports she had a PFO closure on Wednesday.     Patient reports she has developed an occipital headache since she arrived to the ED.  She reports she was given Tylenol with brief relief but headache started to come back about an hour after receiving the Tylenol.  She denies any recent fall or head trauma, neck pain/stiffness, difficulty swallowing, numbness/tingling in extremities, focal weakness.  She denies fever, dizziness, chest pain, shortness of breath, cough, edema, abdominal pain, nausea, vomiting, diarrhea, constipation, urinary symptoms.  She denies any other complaints or concerns at this time.      REVIEW OF SYSTEMS:   Pertinent positives as noted in the HPI. All other

## 2024-07-09 NOTE — CARE COORDINATION
Case Management Note:    Patient admitted as Observation with an anticipated short hospitalization length of stay. Chart reviewed and it appears that patient has minimal needs for discharge at this time. Medical staff to inform case management team if discharge needs are identified.     PT/OT evals still pending. Will await evals prior to discussing discharge plans with pt. Will follow up once therapy evals are complete and recommendations are rendered.      Case management will continue to follow progress and update discharge plan as needed.           Electronically signed by SHARIFA JONES on 7/9/2024 at 8:33 AM

## 2024-07-09 NOTE — PROGRESS NOTES
07/08/24 8167   RT Protocol   History Pulmonary Disease 1   Respiratory pattern 0   Breath sounds 2   Cough 0   Bronchodilator Assessment Score 3

## 2024-07-09 NOTE — ED PROVIDER NOTES
[facility] EMERGENCY DEPARTMENT  EMERGENCY DEPARTMENTENCOUNTER      Pt Name: Brittany Mccall  MRN: [id number]  Birthdate 1975  Date ofevaluation: 7/8/2024  Provider: Jono Pearce MD    CHIEF COMPLAINT       Chief Complaint   Patient presents with    Loss of Vision     Patient arrives through triage with complaints of vision changes around 1400 today. Reports that she lost the bottom half of her vision in the left eye. Vision returned but then started having vision changes in the right eye around 1900. Patient reports pressure behind the right eye. Hx of a stroke with vision changes. Patient had ASD closure on Wednesday.        HPI    HISTORY OF PRESENT ILLNESS   (Location/Symptom, Timing/Onset,Context/Setting, Quality, Duration, Modifying Factors, Severity)  Note limiting factors.   Brittany Mccall is a 48 y.o. female who presents to the emergency department with visual disturbances.  This is a 48-year-old female with a history of a CVA who presents with difficulty seeing out of her left eye and then her right eye.  The patient states that there was her left eye first around 2:00 PM today and around 7:00 PM she started having difficulty seeing with the right eye.  She states her vision is better currently.  She states this is similar to her previous stroke.  The patient also had an ASD closure on Wednesday.        NursingNotes were reviewed.    Review of Systems    REVIEW OF SYSTEMS    (2-9 systems for level 4, 10 or more for level 5)     Review of Systems   Constitutional: Negative for fever.   HENT: Negative for rhinorrhea and sore throat.    Eyes: Negative for redness.   Respiratory: Negative for shortness of breath.    Cardiovascular: Negative for chest pain.   Gastrointestinal: Negative for abdominal pain.   Genitourinary: Negative for flank pain.   Neurological: Negative for headaches.   Hematological: Negative for adenopathy.   Psychiatric/Behavioral: Negative for  confusion.              Except as noted above the remainder of the review of systems was reviewed and negative.       PAST MEDICAL HISTORY     Past Medical History:   Diagnosis Date    Anxiety     DVT (deep vein thrombosis) in pregnancy     left leg in small vessel    Hypertension     Low blood potassium     5/2015 patient states had recent critically low kt         SURGICALHISTORY       Past Surgical History:   Procedure Laterality Date    ABDOMINAL EXPLORATION SURGERY  8/12/15    with repair of vagina     DILATION AND CURETTAGE OF UTERUS      EP DEVICE PROCEDURE N/A 5/14/2024    ABLATION PVC performed by Jann De La Garza MD at [facility] CARDIAC CATH LAB    HYSTERECTOMY (CERVIX STATUS UNKNOWN)  5/28/15    total laparoscopic with LEFT S&O    OVARY REMOVAL      right    TUBAL LIGATION           CURRENT MEDICATIONS       Previous Medications    ACETAMINOPHEN (TYLENOL) 325 MG TABLET    Take 2 tablets by mouth every 6 hours as needed for Pain    ASPIRIN 81 MG EC TABLET    Take 1 tablet by mouth daily    ATORVASTATIN (LIPITOR) 80 MG TABLET    Take 1 tablet by mouth at bedtime    CETIRIZINE (EQ ALLERGY RELIEF, CETIRIZINE,) 10 MG TABLET    Take 1 tablet by mouth once daily    CLONAZEPAM (KLONOPIN) 0.5 MG TABLET    Take 1 tablet by mouth 2 times daily as needed for Anxiety for up to 7 days. Max Daily Amount: 1 mg    CLOPIDOGREL (PLAVIX) 75 MG TABLET    Take 1 tablet by mouth daily    COLCHICINE (COLCRYS) 0.6 MG TABLET    Take 1 tablet by mouth 2 times daily    DESVENLAFAXINE SUCCINATE (PRISTIQ) 50 MG TB24 EXTENDED RELEASE TABLET    Take 1 tablet by mouth daily    ESOMEPRAZOLE (NEXIUM) 40 MG DELAYED RELEASE CAPSULE    TAKE 1 CAPSULE BY MOUTH ONCE DAILY IN THE MORNING BEFORE BREAKFAST    IPRATROPIUM-ALBUTEROL (DUONEB) 0.5-2.5 (3) MG/3ML SOLN NEBULIZER SOLUTION    INHALE 3 MLS INTO THE LUNGS EVERY 4 HOURS    METOPROLOL SUCCINATE (TOPROL XL) 100 MG EXTENDED RELEASE TABLET    Take 1.5 tablets by mouth daily    NICOTINE (NICODERM CQ)

## 2024-07-09 NOTE — PROGRESS NOTES
Occupational Therapy  Brittany Mccall    OT orders received and chart reviewed. Patient currently off the floor for testing.   Will attempt evaluation when patient returns.     Thank you,   Khadijah Silverio, [facility] OTR/L [id number]    1420: Attempted to see patient for OT evaluation. Patient declining evaluation due to already completing PT evaluation. Per chart review and discussion with PT and nurse, patient independent with no needs.   OT orders discontinued. No charge.   Please re-order if change in status.     Thank you,   Khadijah Silverio, [facility] OTR/L [id number]

## 2024-07-09 NOTE — PROGRESS NOTES
[facility] - Inpatient Rehabilitation Department   Phone: [phone]    Physical Therapy    [x] Initial Evaluation            [] Daily Treatment Note         [x] Discharge Summary      Patient: Brittany Mccall   : 1975   MRN: [id number]   Date of Service:  2024  Admitting Diagnosis: Blurred vision  Current Admission Summary: Brittany Mccall is a 48 y.o. female who presents to the emergency department with visual disturbances.  This is a 48-year-old female with a history of a CVA who presents with difficulty seeing out of her left eye and then her right eye.  The patient states that there was her left eye first around 2:00 PM today and around 7:00 PM she started having difficulty seeing with the right eye.  She states her vision is better currently.  She states this is similar to her previous stroke.  The patient also had an ASD closure on Wednesday.   Past Medical History:  has a past medical history of Anxiety, DVT (deep vein thrombosis) in pregnancy, Hypertension, and Low blood potassium.  Past Surgical History:  has a past surgical history that includes Dilation and curettage of uterus; Ovary removal; Tubal ligation; Hysterectomy (5/28/15); Abdominal exploration surgery (8/12/15); ep device procedure (N/A, 2024); and Cardiac procedure (N/A, 7/3/2024).    Discharge Recommendations: Brittany Mccall scored a 24/24 on the -PAC short mobility form.  At this time, no further PT is recommended upon discharge due to pt being independent with mobility.  Recommend patient returns to prior setting with prior services.      DME Required For Discharge: no DME required at discharge  Precautions/Restrictions: low fall risk  Weight Bearing Restrictions: no restrictions  [] Right Upper Extremity  [] Left Upper Extremity [] Right Lower Extremity  [] Left Lower Extremity     Required Braces/Orthotics: no braces required   [] Right  [] Left  Positional Restrictions:no positional

## 2024-07-09 NOTE — PROGRESS NOTES
Date of Admission: 7/8/2024. Hospital Day: 2       Assessment/Plan:   48 y.o.  years old female with past medical history of hypertension, remote stroke, migraine headaches, tobacco abuse and other medical problems comes to the hospital with visual disturbance and headache, occipital.  Had PFO closure about a week ago    Active Hospital Problems    Diagnosis     TIA involving left internal carotid artery [G45.1]     Variants of migraine [G43.809]     Blurry vision [H53.8]     Remote history of stroke [Z86.73]     HTN (hypertension), benign [I10]    Body ache  Report generalized body ache, soreness   Around markers mildly elevated but otherwise normal  Source of infection  Suspect some kind of viral syndrome  Flu COVID and flu  Supportive management with pain control  PT/OT          Headache and visual disturbance /history of CVA  Report headache visual disturbance similar to the prior stroke .   MRI brain negative for acute finding but shows remote left PCA stroke  Symptoms could be atypical migraine versus TIA  Recent PFO closure due to history of stroke  Consult cardiology    Hypertension  Blood pressure mildly elevated, continue home medications              DVT Prophylaxis:    Diet: ADULT DIET; Regular  Diet NPO Exceptions are: Sips of Water with Meds  Code Status: Full Code      Dispo -Home    All  follow up labs and imaging personally reviewed      Chief Complaint:   Chief Complaint   Patient presents with    Loss of Vision     Patient arrives through triage with complaints of vision changes around 1400 today. Reports that she lost the bottom half of her vision in the left eye. Vision returned but then started having vision changes in the right eye around 1900. Patient reports pressure behind the right eye. Hx of a stroke with vision changes. Patient had ASD closure on Wednesday.          Interval  History:   Reports persistent generalized achiness/soreness all over the body and feels weak and tired  because of that.  Patient disturbance improving almost back to normal, headache improving as well        Medications:  Reviewed    Infusion Medications    sodium chloride       Scheduled Medications    aspirin  81 mg Oral Daily    atorvastatin  80 mg Oral Nightly    clopidogrel  75 mg Oral Daily    desvenlafaxine succinate  50 mg Oral Daily with breakfast    pantoprazole  40 mg Oral QAM AC    brexpiprazole  0.5 mg Oral BID    sodium chloride flush  10 mL IntraVENous 2 times per day    enoxaparin  40 mg SubCUTAneous Daily     PRN Meds: traMADol, ketorolac, clonazePAM, ipratropium 0.5 mg-albuterol 2.5 mg, sodium chloride flush, sodium chloride, acetaminophen **OR** acetaminophen, labetalol, senna, ondansetron    No intake or output data in the 24 hours ending 07/09/24 1653    Physical Exam Performed:    /63   Pulse 79   Temp 98.4 °F (36.9 °C) (Axillary)   Resp 18   Ht 1.753 m (5' 9\")   Wt 102.5 kg (226 lb)   LMP 04/11/2015 (Exact Date)   SpO2 95%   BMI 33.37 kg/m²     General appearance: No apparent distress, appears stated age and cooperative.  Respiratory:  Normal respiratory effort. Clear to auscultation, bilaterally without Rales/Wheezes/Rhonchi.  Cardiovascular: Regular rate and rhythm with normal S1/S2 without murmurs, rubs or gallops.  Abdomen: Soft, non-tender, non-distended with normal bowel sounds.  Musculoskeletal: No clubbing, cyanosis or edema bilaterally.  Full range of motion without deformity.  Skin: Skin color, texture, turgor normal.  No rashes or lesions.  Neurologic:  Neurovascularly intact without any focal sensory/motor deficits. Cranial nerves: II-XII intact, grossly non-focal.  Psychiatric: Alert and oriented, thought content appropriate, normal insight      Labs:   Recent Labs     07/08/24 1958 07/09/24  0527   WBC 15.2* 10.9   HGB 13.2 12.2   HCT 37.3 34.8*    326     Recent Labs     07/08/24 1958 07/09/24 0527    141   K 3.4* 3.3*   CL 98* 105   CO2 20* 22   BUN

## 2024-07-09 NOTE — CONSULTS
[facility]  Cardiology Consult    Brittany Mccall  1975    July 9, 2024      Reason for Referral: CVA    CC: visual disturbance       Subjective:     History of Present Illness:    Brittany Mccall is a 48 y.o. patient with a PMH significant for stroke A SD presented with complaints of visual disturbances.    She has had left visual loss affecting the bottom half of the visual field lasting 10 minutes.  Also reports later in the day having right visual loss.  Having floaters.  She also had right-sided headaches.  MRI did not show any new stroke  CT head did not show any acute intracranial pathology  Blood pressure elevated.           Past Medical History:   has a past medical history of Anxiety, DVT (deep vein thrombosis) in pregnancy, Hypertension, and Low blood potassium.    Surgical History:   has a past surgical history that includes Dilation and curettage of uterus; Ovary removal; Tubal ligation; Hysterectomy (5/28/15); Abdominal exploration surgery (8/12/15); ep device procedure (N/A, 5/14/2024); and Cardiac procedure (N/A, 7/3/2024).     Social History:   reports that she has quit smoking. Her smoking use included cigarettes. She has a 3.0 pack-year smoking history. She uses smokeless tobacco. She reports current alcohol use. She reports that she does not use drugs.     Family History:  family history includes Diabetes in her father; Heart Disease in her father; High Blood Pressure in her father.    Home Medications:  Were reviewed and are listed in nursing record and/or below  Prior to Admission medications    Medication Sig Start Date End Date Taking? Authorizing Provider   clonazePAM (KLONOPIN) 0.5 MG tablet Take 1 tablet by mouth 2 times daily as needed for Anxiety for up to 7 days. Max Daily Amount: 1 mg 7/1/24 7/8/24  Kamran Saleh APRN - CNP   colchicine (COLCRYS) 0.6 MG tablet Take 1 tablet by mouth 2 times daily 5/30/24 6/29/24  Jann De La Garza MD   atorvastatin (LIPITOR) 80 MG  (KLONOPIN) tablet 0.5 mg, BID PRN  clopidogrel (PLAVIX) tablet 75 mg, Daily  desvenlafaxine succinate (PRISTIQ) extended release tablet 50 mg, Daily with breakfast  pantoprazole (PROTONIX) tablet 40 mg, QAM AC  ipratropium 0.5 mg-albuterol 2.5 mg (DUONEB) nebulizer solution 1 Dose, Q6H PRN  brexpiprazole (REXULTI) tablet 0.5 mg, BID  sodium chloride flush 0.9 % injection 10 mL, 2 times per day  sodium chloride flush 0.9 % injection 10 mL, PRN  0.9 % sodium chloride infusion, PRN  acetaminophen (TYLENOL) tablet 650 mg, Q4H PRN   Or  acetaminophen (TYLENOL) suppository 650 mg, Q4H PRN  enoxaparin (LOVENOX) injection 40 mg, Daily  labetalol (NORMODYNE;TRANDATE) injection 10 mg, Q10 Min PRN  senna (SENOKOT) tablet 8.6 mg, Daily PRN  ondansetron (ZOFRAN) injection 4 mg, Q6H PRN        Allergies:  Amoxicillin-pot clavulanate, Bee venom, Hydrochlorothiazide, and Amoxicillin           Review of Systems: SEE HPI   Constitutional: no unanticipated weight loss. There's been no change in energy level, sleep pattern, or activity level. No fevers, chills.   Eyes: No visual changes or diplopia. No scleral icterus.  ENT: No Headaches, hearing loss or vertigo. No mouth sores or sore throat.  Cardiovascular: No Chest pain, tightness or discomfort.   No Shortness of breath.   No Dyspnea on exertion, Orthopnea, Paroxysmal nocturnal dyspnea or breathlessness at rest.  No Palpitations.  No Syncope ('blackouts', 'faints', 'collapse') or dizziness.   Respiratory: No cough or wheezing, no sputum production. No hematemesis.    Gastrointestinal: No abdominal pain, appetite loss, blood in stools. No change in bowel or bladder habits.  Genitourinary: No dysuria, trouble voiding, or hematuria.  Musculoskeletal:  No gait disturbance, no joint complaints.  Integumentary: No rash or pruritis.  Neurological: No headache, diplopia, change in muscle strength, numbness or tingling.   Psychiatric: No anxiety or depression.  Endocrine: No temperature

## 2024-07-10 ENCOUNTER — HOSPITAL ENCOUNTER (OUTPATIENT)
Age: 49
Setting detail: OBSERVATION
Discharge: HOME | End: 2024-07-12
Attending: INTERNAL MEDICINE
Payer: MEDICARE

## 2024-07-10 VITALS
WEIGHT: 228.3 LBS | TEMPERATURE: 98.3 F | HEIGHT: 69 IN | OXYGEN SATURATION: 95 % | SYSTOLIC BLOOD PRESSURE: 162 MMHG | DIASTOLIC BLOOD PRESSURE: 83 MMHG | BODY MASS INDEX: 33.82 KG/M2 | RESPIRATION RATE: 18 BRPM | HEART RATE: 69 BPM

## 2024-07-10 VITALS
SYSTOLIC BLOOD PRESSURE: 159 MMHG | HEART RATE: 77 BPM | OXYGEN SATURATION: 97 % | DIASTOLIC BLOOD PRESSURE: 90 MMHG | RESPIRATION RATE: 15 BRPM

## 2024-07-10 PROBLEM — Q21.12 PFO (PATENT FORAMEN OVALE): Status: ACTIVE | Noted: 2024-07-10

## 2024-07-10 LAB — ECHO BSA: 2.23 M2

## 2024-07-10 PROCEDURE — 99152 MOD SED SAME PHYS/QHP 5/>YRS: CPT | Performed by: INTERNAL MEDICINE

## 2024-07-10 PROCEDURE — 2580000003 HC RX 258: Performed by: PHYSICIAN ASSISTANT

## 2024-07-10 PROCEDURE — 93325 DOPPLER ECHO COLOR FLOW MAPG: CPT

## 2024-07-10 PROCEDURE — 6370000000 HC RX 637 (ALT 250 FOR IP): Performed by: INTERNAL MEDICINE

## 2024-07-10 PROCEDURE — G0378 HOSPITAL OBSERVATION PER HR: HCPCS

## 2024-07-10 PROCEDURE — 7100000010 HC PHASE II RECOVERY - FIRST 15 MIN: Performed by: INTERNAL MEDICINE

## 2024-07-10 PROCEDURE — 93325 DOPPLER ECHO COLOR FLOW MAPG: CPT | Performed by: INTERNAL MEDICINE

## 2024-07-10 PROCEDURE — 6360000002 HC RX W HCPCS: Performed by: INTERNAL MEDICINE

## 2024-07-10 PROCEDURE — 7100000011 HC PHASE II RECOVERY - ADDTL 15 MIN: Performed by: INTERNAL MEDICINE

## 2024-07-10 PROCEDURE — 6370000000 HC RX 637 (ALT 250 FOR IP): Performed by: PHYSICIAN ASSISTANT

## 2024-07-10 PROCEDURE — 93312 ECHO TRANSESOPHAGEAL: CPT | Performed by: INTERNAL MEDICINE

## 2024-07-10 PROCEDURE — 99233 SBSQ HOSP IP/OBS HIGH 50: CPT | Performed by: INTERNAL MEDICINE

## 2024-07-10 RX ORDER — LIDOCAINE HYDROCHLORIDE 20 MG/ML
SOLUTION OROPHARYNGEAL PRN
Status: COMPLETED | OUTPATIENT
Start: 2024-07-10 | End: 2024-07-10

## 2024-07-10 RX ORDER — ACETAMINOPHEN 650 MG/1
650 SUPPOSITORY RECTAL EVERY 4 HOURS PRN
Status: DISCONTINUED | OUTPATIENT
Start: 2024-07-10 | End: 2024-07-10 | Stop reason: HOSPADM

## 2024-07-10 RX ORDER — MIDAZOLAM HYDROCHLORIDE 1 MG/ML
INJECTION INTRAMUSCULAR; INTRAVENOUS PRN
Status: COMPLETED | OUTPATIENT
Start: 2024-07-10 | End: 2024-07-10

## 2024-07-10 RX ORDER — ACETAMINOPHEN 325 MG/1
650 TABLET ORAL EVERY 4 HOURS PRN
Status: DISCONTINUED | OUTPATIENT
Start: 2024-07-10 | End: 2024-07-10 | Stop reason: HOSPADM

## 2024-07-10 RX ORDER — FENTANYL CITRATE 50 UG/ML
INJECTION, SOLUTION INTRAMUSCULAR; INTRAVENOUS PRN
Status: COMPLETED | OUTPATIENT
Start: 2024-07-10 | End: 2024-07-10

## 2024-07-10 RX ADMIN — FENTANYL CITRATE 50 MCG: 50 INJECTION, SOLUTION INTRAMUSCULAR; INTRAVENOUS at 13:56

## 2024-07-10 RX ADMIN — BREXPIPRAZOLE 0.5 MG: 0.5 TABLET ORAL at 09:06

## 2024-07-10 RX ADMIN — FENTANYL CITRATE 50 MCG: 50 INJECTION, SOLUTION INTRAMUSCULAR; INTRAVENOUS at 13:53

## 2024-07-10 RX ADMIN — MIDAZOLAM 1 MG: 1 INJECTION INTRAMUSCULAR; INTRAVENOUS at 13:58

## 2024-07-10 RX ADMIN — APIXABAN 5 MG: 5 TABLET, FILM COATED ORAL at 14:32

## 2024-07-10 RX ADMIN — SODIUM CHLORIDE, PRESERVATIVE FREE 10 ML: 5 INJECTION INTRAVENOUS at 09:06

## 2024-07-10 RX ADMIN — LIDOCAINE HYDROCHLORIDE 10 ML: 20 SOLUTION ORAL; TOPICAL at 13:44

## 2024-07-10 RX ADMIN — PANTOPRAZOLE SODIUM 40 MG: 40 TABLET, DELAYED RELEASE ORAL at 05:02

## 2024-07-10 RX ADMIN — DESVENLAFAXINE SUCCINATE 50 MG: 50 TABLET, EXTENDED RELEASE ORAL at 09:06

## 2024-07-10 RX ADMIN — CLONAZEPAM 0.5 MG: 0.5 TABLET ORAL at 09:06

## 2024-07-10 RX ADMIN — MIDAZOLAM 1 MG: 1 INJECTION INTRAMUSCULAR; INTRAVENOUS at 13:54

## 2024-07-10 RX ADMIN — MIDAZOLAM 1 MG: 1 INJECTION INTRAMUSCULAR; INTRAVENOUS at 13:55

## 2024-07-10 RX ADMIN — ASPIRIN 81 MG: 81 TABLET, COATED ORAL at 09:06

## 2024-07-10 RX ADMIN — CLOPIDOGREL BISULFATE 75 MG: 75 TABLET ORAL at 09:06

## 2024-07-10 ASSESSMENT — PAIN SCALES - GENERAL: PAINLEVEL_OUTOF10: 0

## 2024-07-10 NOTE — PROGRESS NOTES
[facility]     Daily Progress Note      Admit Date:  7/8/2024    Chief Complaint   Patient presents with    Loss of Vision     Patient arrives through triage with complaints of vision changes around 1400 today. Reports that she lost the bottom half of her vision in the left eye. Vision returned but then started having vision changes in the right eye around 1900. Patient reports pressure behind the right eye. Hx of a stroke with vision changes. Patient had ASD closure on Wednesday.         ASSESSMENT AND PLAN:  Blurry vision - ? Complex migraine versus TIA  Prior ischemic stroke  PFO s/p PFO closure about 1 week ago    Plan  NOEMI done today  PFO device is well-placed with no evidence of erosion  There is a small amount of agitated saline crossing around the inferior edge of device - this is because the device hasn't had adequate time to fully endotheliaze (typically takes 6 months) rather than a device failure    No KENDRA clot  Normal LVEF    Given recurrent neuro events will change aspirin + Plavix to aspirin + Eliquis, especially since she is heterozygous for factor V leiden    Can consider weaning her off Eliquis after 6 months if repeat NOEMI can demonstrate complete seal of PFO    Ok to discharge home later today when recovered from sedation     Subjective:  Ms. Mccall feels poorly today, a lot of nausea.  No chest pain.  No dyspnea.    Objective:   BP (!) 146/81   Pulse 70   Temp 98.3 °F (36.8 °C) (Oral)   Resp 18   Ht 1.753 m (5' 9\")   Wt 103.6 kg (228 lb 4.8 oz)   LMP 04/11/2015 (Exact Date)   SpO2 96%   BMI 33.71 kg/m²     Intake/Output Summary (Last 24 hours) at 7/10/2024 1404  Last data filed at 7/10/2024 0912  Gross per 24 hour   Intake 50 ml   Output --   Net 50 ml       TELEMETRY: Sinus     Physical Exam:  General:  Awake, alert, oriented x 3, NAD  Skin:  Warm and dry  Neck:  JVD none  Chest:  normal air entry  Cardiovascular:  RRR S1S2, no S3, no murmur  Abdomen:  Soft, ND, NT, No

## 2024-07-10 NOTE — PLAN OF CARE
Problem: Pain  Goal: Verbalizes/displays adequate comfort level or baseline comfort level  7/9/2024 2245 by Jeromy Jama RN  Outcome: Progressing  7/9/2024 0954 by Marianela Scott  Outcome: Progressing     Problem: Discharge Planning  Goal: Discharge to home or other facility with appropriate resources  7/9/2024 2245 by Jeromy Jama, RN  Outcome: Progressing  7/9/2024 0954 by Marianela Scott  Outcome: Progressing      1

## 2024-07-10 NOTE — PROGRESS NOTES
..RN discharge summary from [facility] to home.     This patient has had a discharge order placed. They are returning home and being picked up in the lobby. Discharge paperwork has been printed, highlighted, and gone over with the patient by this RN. Patient understands teaching and has no further questions at this time. IV has been removed with no complications. Telemetry has been removed. Pt has all belongings present.  Himanshu picked up at the pharmacy.

## 2024-07-10 NOTE — PROGRESS NOTES
CLINICAL PHARMACY NOTE: MEDS TO BEDS    Total # of Prescriptions Filled: 1   The following medications were delivered to the patient:  ELIQUIS 5MG    Additional Documentation:  Patient picked up in outpatient pharmacy - signed  Yolanda William- Pharmacy Tech.

## 2024-07-10 NOTE — PROGRESS NOTES
Shift assessment completed. Routine vitals stable. Scheduled medications given. Patient is awake, alert and oriented. Respirations are easy and unlabored. Patient does not appear to be in distress, resting comfortably at this time. Call light within reach. Patient nervous about NOEMI today, reassurance given.

## 2024-07-10 NOTE — SEDATION DOCUMENTATION
Brief Pre-Op Note/Sedation Assessment      Brittany Mccall  1975  [id number]      [id number]  12:34 PM    Planned Procedure: Transesophageal Echocardiogram     Post Procedure Plan: Return to same level of care    Consent: I have discussed with the patient and/or the patient representative the indication, alternatives, and the possible risks and/or complications of the planned procedure and the anesthesia methods. The patient and/or patient representative appear to understand and agree to proceed.    Chief Complaint: blurry vision, concern for TIA after recent PFO closure    Vital Signs:  BP (!) 146/81   Pulse 70   Temp 98.3 °F (36.8 °C) (Oral)   Resp 18   Ht 1.753 m (5' 9\")   Wt 103.6 kg (228 lb 4.8 oz)   LMP 04/11/2015 (Exact Date)   SpO2 96%   BMI 33.71 kg/m²     Allergies:  Allergies   Allergen Reactions    Amoxicillin-Pot Clavulanate     Bee Venom     Hydrochlorothiazide     Amoxicillin Nausea And Vomiting     Past Medical History:  Past Medical History:   Diagnosis Date    Anxiety     DVT (deep vein thrombosis) in pregnancy     left leg in small vessel    Hypertension     Low blood potassium     5/2015 patient states had recent critically low kt     Surgical History:  Past Surgical History:   Procedure Laterality Date    ABDOMINAL EXPLORATION SURGERY  8/12/15    with repair of vagina     CARDIAC PROCEDURE N/A 7/3/2024    Patent foramen ovale closure performed by Iam Olivares MD at [facility] CARDIAC CATH LAB    DILATION AND CURETTAGE OF UTERUS      EP DEVICE PROCEDURE N/A 5/14/2024    ABLATION PVC performed by Jann De La Garza MD at [facility] CARDIAC CATH LAB    HYSTERECTOMY (CERVIX STATUS UNKNOWN)  5/28/15    total laparoscopic with LEFT S&O    OVARY REMOVAL      right    TUBAL LIGATION       Medications:  Current Facility-Administered Medications   Medication Dose Route Frequency Provider Last Rate Last Admin    acetaminophen (TYLENOL) tablet 650 mg  650 mg Oral Q4H PRN Mushtaq Cruz MD          Pre-Sedation:    Pre-Sedation Documentation and Exam:  I have assessed the patient and agree with the H&P present on the chart.    Prior History of Anesthesia Complications:   none    Modified Mallampati:  I (soft palate, uvula, fauces, tonsillar pillars visible)    ASA Classification:  Class 2 - A normal healthy patient with mild systemic disease      Guzman Scale:  Activity:  2 - Able to move 4 extremities voluntarily on command  Respiration:  2 - Able to breathe deeply and cough freely  Circulation:  2 - BP+/- 20mmHg of normal  Consciousness:  2 - Fully awake  Oxygen Saturation (color):  2 - Able to maintain oxygen saturation >92% on room air    Sedation/Anesthesia Plan:  Guard the patient's safety and welfare.  Minimize physical discomfort and pain.  Minimize negative psychological responses to treatment by providing sedation and analgesia and maximize the potential amnesia.  Patient to meet pre-procedure discharge plan.    Medication Planned:  midazolam intravenously and fentanyl intravenously      Patient is an appropriate candidate for plan of sedation: yes    SHUKRI Ewing MD  [facility]  Noninvasive and Adult Congenital Cardiologist  [phone]  [email or web address]

## 2024-07-10 NOTE — PLAN OF CARE
Problem: Pain  Goal: Verbalizes/displays adequate comfort level or baseline comfort level  7/10/2024 0911 by Barbara Baron RN  Outcome: Progressing  Flowsheets (Taken 7/10/2024 0900)  Verbalizes/displays adequate comfort level or baseline comfort level: Encourage patient to monitor pain and request assistance  7/9/2024 2245 by Jeromy Jama, RN  Outcome: Progressing     Problem: Discharge Planning  Goal: Discharge to home or other facility with appropriate resources  7/10/2024 0911 by Barbara Baron RN  Outcome: Progressing  7/9/2024 2245 by Jeromy Jama, RN  Outcome: Progressing     Problem: ABCDS Injury Assessment  Goal: Absence of physical injury  Outcome: Progressing

## 2024-07-10 NOTE — PROGRESS NOTES
Brief immediate postprocedure note       NOEMI performed w/o complications      Anesthesia: 1% viscous lidocaine, cetacaine spray   Sedation: 3 mg IV versed   Analgesia: 100  mcg IV fentanyl     Probe inserted easily and removed easily   Findings: PFO device well-seated, slight bubble passing around (not through) device around inferior edge, no KENDRA clot      Complications: none   EBL: none   Specimens: N/A       CONSCIOUS SEDATION: Conscious sedation was given. The medication documented above was administered during the study.There was a trained observer present throughout the   entire time during which sedation was administered. Moderate sedation time   was 15 minutes.

## 2024-07-10 NOTE — PROGRESS NOTES
Patient getting upset cause she has not had her NOEMI done yet, explained to patient why there is a wait but she said she is about to walk out of here. Family at bedside and stated to this nurse not to listen to the patient she is just overacting. Call placed to cath lab and stated it will be 30 mins then they will be ready for patient.

## 2024-07-10 NOTE — PLAN OF CARE
Problem: Pain  Goal: Verbalizes/displays adequate comfort level or baseline comfort level  7/10/2024 1431 by Barbara Baron RN  Outcome: Completed  Flowsheets (Taken 7/10/2024 1415)  Verbalizes/displays adequate comfort level or baseline comfort level: Encourage patient to monitor pain and request assistance  7/10/2024 0911 by Barbara Baron, RN  Outcome: Progressing  Flowsheets (Taken 7/10/2024 0900)  Verbalizes/displays adequate comfort level or baseline comfort level: Encourage patient to monitor pain and request assistance     Problem: Discharge Planning  Goal: Discharge to home or other facility with appropriate resources  7/10/2024 1431 by Barbara Baron RN  Outcome: Completed  7/10/2024 0911 by Barbara Baron RN  Outcome: Progressing     Problem: ABCDS Injury Assessment  Goal: Absence of physical injury  7/10/2024 1431 by Barbara Baron RN  Outcome: Completed  7/10/2024 0911 by Barbara Baron RN  Outcome: Progressing

## 2024-07-11 NOTE — DISCHARGE SUMMARY
Pt states he is having a major anxiety attack and the med he was given for it is not working.  Pt states he wants to be admitted to station 12. Pt states he was recently hospitalized for mental health and then was sent to treatment in [place].  Pt states he was discharge today and got back in town today and is homeless and that is when the anxiety started.    no focal consolidations or pleural effusions. There is no appreciable pneumothorax. BONES/SOFT TISSUE: No acute abnormality.     No radiographic evidence of acute pulmonary disease.     CTA HEAD NECK W CONTRAST    Result Date: 7/8/2024  EXAMINATION: CTA OF THE HEAD AND NECK WITH CONTRAST 7/8/2024 7:51 pm: TECHNIQUE: CTA of the head and neck was performed with the administration of intravenous contrast. Multiplanar reformatted images are provided for review.  MIP images are provided for review. Stenosis of the internal carotid arteries measured using NASCET criteria. Automated exposure control, iterative reconstruction, and/or weight based adjustment of the mA/kV was utilized to reduce the radiation dose to as low as reasonably achievable. This scan was analyzed using [employer] contact LVO. Identification of suspected findings is not for diagnostic use beyond notification. Viz LVO is limited to analysis of imaging data and should not be used in-lieu of full patient evaluation or relied upon to make or confirm diagnosis. COMPARISON: None. HISTORY: ORDERING SYSTEM PROVIDED HISTORY: cva? TECHNOLOGIST PROVIDED HISTORY: Reason for exam:->cva? Has a \"code stroke\" or \"stroke alert\" been called?->No Decision Support Exception - unselect if not a suspected or confirmed emergency medical condition->Emergency Medical Condition (MA) FINDINGS: CTA NECK: AORTIC ARCH/ARCH VESSELS: No dissection or arterial injury.  No significant stenosis of the brachiocephalic or subclavian arteries. CAROTID ARTERIES: Stable mild noncalcified plaque involving the proximal left internal carotid artery.  No dissection, arterial injury, or hemodynamically significant stenosis by NASCET criteria. VERTEBRAL ARTERIES: No dissection, arterial injury, or significant stenosis. SOFT TISSUES: The lung apices are clear.  No cervical or superior mediastinal lymphadenopathy.  The larynx and pharynx are unremarkable.  No acute abnormality of the salivary and  sheaths.  Next, a J wire was inserted through a 5F MP catheter in the 6 Fr sheath. The J wire was placed in a left pulmonary vein. The MP catheter was advanced into the pulmonary vein. Heparin was administered to maintain an ACT over 250 seconds. The J wire was then exchanged for an exchange-length Amplatz wire. The catheter and sheath were removed. A 9 Fr Torqvue delivery sheath was then inserted. When the sheath was across the atrial septal defect, the dilator and Amplatz wire were removed.  The sheath was flushed. Under ICE and fluoroscopic guidance, an Amplatzer device was inserted through the Torqvue sheath into the left atrium.  The left atrial disk was deployed.  The sheath/device combination was then withdrawn such that the left atrial disk approximated the atrial septum.  The right atrial disk was then deployed. We then interrogated the device and defect with ICE. Post-deployment, a \"wiggle test\" was done under ICE and fluoroscopic monitoring to confirm device stability. The delivery cable was detached and removed.  Further assessment with ICE was done, including a bubble study as necessary. The ICE catheter was then removed. Sheaths were flushed and removed.Short sheaths were exchanged in for the longer/delivery sheaths. Hemostasis was obtained by manual pressure once ACT dropped to appropriate level.  Sizing Balloon Was utilized for procedure.  Sizing balloon advanced across defect and inflated to \"stop flow.\"  Balloon waist mesaurement = 9mm. Imaging An intracardiac echocardiogram (ICE) was performed evaluating the atria, valvular structures, the aortic valve, and the atrial septum (done via the long 9 Fr sheath).  ICE Findings Post-device closure, the device appeared well seated, and the defect closed. Monitoring Continuous pulse-oximetry and ECG monitoring as well as frequent blood pressure assessments were performed. Anesthesia Independent trained observer pushed medications at my direction. We

## 2024-07-12 ENCOUNTER — TELEPHONE (OUTPATIENT)
Dept: FAMILY MEDICINE CLINIC | Age: 49
End: 2024-07-12

## 2024-07-15 NOTE — TELEPHONE ENCOUNTER
Care Transitions Initial Follow Up Call    Outreach made within 2 business days of discharge: Yes    Patient: Brittany Mccall Patient : 1975   MRN: [id number]  Reason for Admission: There are no discharge diagnoses documented for the most recent discharge.  Discharge Date: 7/10/24       Spoke with: PATIENT    Discharge department/facility: [facility]    TCM Interactive Patient Contact:  Was patient able to fill all prescriptions: Yes  Was patient instructed to bring all medications to the follow-up visit: Yes  Is patient taking all medications as directed in the discharge summary? Yes  Does patient understand their discharge instructions: Yes  Does patient have questions or concerns that need addressed prior to 7-14 day follow up office visit: no    Scheduled appointment with PCP within 7-14 days    Follow Up  Future Appointments   Date Time Provider Department Center   2024  2:00 PM [facility]   2024  9:00 AM Aidee Nicole APRN - CNP  [facility]   2024  3:00 PM Jann De La Garza MD  [facility]   2024  1:40 PM Kamran Saleh [facility]   10/3/2024  1:30 PM Quintin Ewing MD [place] [facility]   2024  1:00 PM WST ECHO 2 [facility]   2024  2:15 PM Thee Liu MD [facility]     SPOKE TO PT AND SCHEDULED HOSPITAL FOLLOW UP WITH ELSA GALEAS CNP FOR 2024. Kim Apodaca MA

## 2024-07-18 ENCOUNTER — TELEPHONE (OUTPATIENT)
Dept: FAMILY MEDICINE CLINIC | Age: 49
End: 2024-07-18

## 2024-07-18 NOTE — TELEPHONE ENCOUNTER
CALLED AND SPOKE TO PATIENT AND SHE SAID SHE WILL JUST WAIT UNTIL MONDAY TO TALK TO POLLO AND HOPE IT GETS BETTER BY THEN. SC

## 2024-07-18 NOTE — TELEPHONE ENCOUNTER
Patient is calling because she has a cluster of blisters on her lips. She would like for Daniel to call something into the pharmacy.     Symptoms - started 2 days ago - blisters on lips, next day got diarrhea and body aches, no fever. She did not test for covid. She was in the hospital last week and they tested for covid than, but it was negative.     [facility] - [address] - phone no. [phone]

## 2024-07-18 NOTE — TELEPHONE ENCOUNTER
I would highly recommend that she test for COVID-19 since she was just in the hospital, and they likely tested her early in the stay.  We could even do a labs only appointment if she would like.  She is within the window of treatment for COVID-19 if she were to be positive.

## 2024-07-22 ENCOUNTER — OFFICE VISIT (OUTPATIENT)
Dept: FAMILY MEDICINE CLINIC | Age: 49
End: 2024-07-22

## 2024-07-22 VITALS
SYSTOLIC BLOOD PRESSURE: 144 MMHG | OXYGEN SATURATION: 99 % | BODY MASS INDEX: 33.41 KG/M2 | DIASTOLIC BLOOD PRESSURE: 80 MMHG | HEIGHT: 69 IN | HEART RATE: 62 BPM | WEIGHT: 225.6 LBS

## 2024-07-22 DIAGNOSIS — H69.92 EUSTACHIAN TUBE DYSFUNCTION, LEFT: Primary | ICD-10-CM

## 2024-07-22 DIAGNOSIS — M79.10 MYALGIA: ICD-10-CM

## 2024-07-22 DIAGNOSIS — G45.9 TIA (TRANSIENT ISCHEMIC ATTACK): ICD-10-CM

## 2024-07-22 DIAGNOSIS — G43.109 MIGRAINE WITH VISUAL AURA: ICD-10-CM

## 2024-07-22 DIAGNOSIS — Z09 HOSPITAL DISCHARGE FOLLOW-UP: ICD-10-CM

## 2024-07-22 RX ORDER — PREDNISONE 10 MG/1
TABLET ORAL
Qty: 20 TABLET | Refills: 0 | Status: SHIPPED | OUTPATIENT
Start: 2024-07-22

## 2024-07-22 NOTE — PROGRESS NOTES
Post-Discharge Transitional Care  Follow Up      Brittany Mccall   YOB: 1975    Date of Office Visit:  7/22/2024  Date of Hospital Admission: 7/8/24  Date of Hospital Discharge: 7/10/24  Risk of hospital readmission (high >=14%. Medium >=10%) :Readmission Risk Score: 9.1      Care management risk score Rising risk (score 2-5) and Complex Care (Scores >=6): No Risk Score On File     Non face to face  following discharge, date last encounter closed (first attempt may have been earlier): 07/12/2024    Call initiated 2 business days of discharge: Yes    ASSESSMENT/PLAN:   SEE BELOW         Subjective:   HPI:  Follow up of Hospital problems/diagnosis(es):   Brittany was seen today for follow-up from hospital.    Diagnoses and all orders for this visit:  Inpatient stay reviewed including provider notes, imaging, procedures, blood work, medications, vitals, and discharge note    Eustachian tube dysfunction, left  predniSONE (DELTASONE) 10 MG tablet; 4 tabs x 2 d 3 tabs x 2 d 2 tabs x 2 d 1  tab x 2 d in am with food  DO NOT TAKE NSAIDs while on this medication  Instructions for Respiratory Infections (SAVE THIS SHEET)   Gargle: Gargle in the back of the throat with the head tilted back and to the sides with a strong mouthwash ( Listerine or Scope) after meals and at bedtime at least 4 -5 times a day. This helps kill bacteria and viruses in the back of the throat and will shorten the duration and decrease the severity of your symptoms: sore throat, cough, ear popping,/ear pain, and possibly dizziness.      Myalgia  -     predniSONE (DELTASONE) 10 MG tablet; 4 tabs x 2 d 3 tabs x 2 d 2 tabs x 2 d 1  tab x 2 d in am with food  DO NOT TAKE NSAIDs while on this medication    Hospital discharge follow-up  -     RI DISCHARGE MEDS RECONCILED W/ CURRENT OUTPATIENT MED LIST    TIA (transient ischemic attack)  CONTINUE ELIQUIS - ASA   FOLLOW UP WITH CARDIOLOGY  Migraine with visual aura  NEUROLOGY RESULTS REVIEWED

## 2024-07-22 NOTE — PROGRESS NOTES
[facility]     Outpatient Follow Up Note    CHIEF COMPLAINT / HPI: Hospital Follow Up secondary to PFO closure     Hospital record has been reviewed  Hospital Course progressed as follows per discharge summary:     Hospital Course:   48 F w h/o cva/stroke  admitted with   b/l visual blurring and post headache, similar to one she had during stroke.  MRI brain show remote infarct but no acute findings.  Symptoms could be from tia va atypical migraine. Slowly resolving  Pt had PFO closure a week ago ,underwent NOEMI  which didn't show obvious failure of closure but  cardio rec starting eliquis in addition to aspirin for 6-12 months until it matures  Reported bodyache after the procedure(PFO closure). Suspect  reaction to meds vs some viral syndrome .     Brittany Mccall is 48 y.o. female who presents today for a routine follow up after a recent hospitalization related to the above mentioned issues.  Subjective:     C/o chest discomfort ever since PFO closure. Describes as squeezing that feels worse with deep breath in. No worsening with exertion. Also feeling body aches all over. Tylenol not helping. No fever or chills. No N/v/d. Feeling terrible since procedure. Denies further visual disturbances.   Pt went to PCP and was told she has an inner ear infection and was prescribed steroids for her ear and for her body aches. She doesn't plan to take them d/t historically having SE to steroids, makes her \"mean\".   For last two days pt reports having dark / tarry stools. This is new.   There is no SOB/JUAREZ. The patient denies orthopnea/PND. Has ankle swelling in evenings intermittently. Her weight is stable. The patient is experiencing dizziness position changes.   Has persistent palpitations that occurs daily. About 5-10 min at a time. States she was talking 200mg daily of Toprol and felt dizzy so went down to 150mg daily.     With regard to medication therapy the patient has been compliant with prescribed

## 2024-07-22 NOTE — PATIENT INSTRUCTIONS
at time of . Instead of the fee, you can choose to have the paperwork filled out during a separate office visit that is for filling out the paperwork only.  Medication Samples:  This office does not carry medication samples.  If you need assistance in getting your medications, then please let the medical assistant know so they can help you sign up for a drug assistance program that can help get medications at a reduced cost or even free (if you qualify).  Workman's Comp Claims: We do not handle workman's comp cases or claims. You will need to go to an urgent care to be seen or to whomever your employer uses.  General - Any abusive/rude behavior toward staff/providers may be cause for dismissal.      WE NOW OFFER [employer] SELF-SCHEDULING   IN 3 EASY STEPS    SCHEDULE AN APPT AT YOUR CONVENIENCE WITH NO HOLD/WAIT TIME  IN THE [employer] CON[employer]ELO SELECT 'SCHEDULE AN APPOINTMENT' FROM THE MENU  CHOOSE DATE/TIME THAT WORKS FOR YOU    If you don't find an appointment time that works for your schedule, you can also submit an appointment request thru [employer].    CONVENIENT QUALITY CARE AT YOUR FINGERTIPS    NOT ON [employer]?  PLEASE ASK ANY STAFF MEMBER     You may receive a survey regarding the care you received during your visit.  Your input is valuable to us.  We encourage you to complete and return your survey.  We hope you will choose us in the future for your healthcare needs.

## 2024-07-25 ENCOUNTER — OFFICE VISIT (OUTPATIENT)
Dept: CARDIOLOGY CLINIC | Age: 49
End: 2024-07-25
Payer: MEDICARE

## 2024-07-25 VITALS
DIASTOLIC BLOOD PRESSURE: 100 MMHG | HEIGHT: 69 IN | SYSTOLIC BLOOD PRESSURE: 134 MMHG | HEART RATE: 73 BPM | OXYGEN SATURATION: 98 % | BODY MASS INDEX: 33.18 KG/M2 | WEIGHT: 224 LBS

## 2024-07-25 DIAGNOSIS — R00.2 PALPITATION: ICD-10-CM

## 2024-07-25 DIAGNOSIS — Z09 HOSPITAL DISCHARGE FOLLOW-UP: ICD-10-CM

## 2024-07-25 DIAGNOSIS — R19.5 DARK STOOLS: ICD-10-CM

## 2024-07-25 DIAGNOSIS — Q21.12 PATENT FORAMEN OVALE: Primary | ICD-10-CM

## 2024-07-25 DIAGNOSIS — I49.3 PVC (PREMATURE VENTRICULAR CONTRACTION): ICD-10-CM

## 2024-07-25 DIAGNOSIS — I10 ESSENTIAL HYPERTENSION: ICD-10-CM

## 2024-07-25 PROCEDURE — 99214 OFFICE O/P EST MOD 30 MIN: CPT | Performed by: NURSE PRACTITIONER

## 2024-07-25 PROCEDURE — G8427 DOCREV CUR MEDS BY ELIG CLIN: HCPCS | Performed by: NURSE PRACTITIONER

## 2024-07-25 PROCEDURE — 3080F DIAST BP >= 90 MM HG: CPT | Performed by: NURSE PRACTITIONER

## 2024-07-25 PROCEDURE — 4004F PT TOBACCO SCREEN RCVD TLK: CPT | Performed by: NURSE PRACTITIONER

## 2024-07-25 PROCEDURE — 3075F SYST BP GE 130 - 139MM HG: CPT | Performed by: NURSE PRACTITIONER

## 2024-07-25 PROCEDURE — 1111F DSCHRG MED/CURRENT MED MERGE: CPT | Performed by: NURSE PRACTITIONER

## 2024-07-25 PROCEDURE — G8417 CALC BMI ABV UP PARAM F/U: HCPCS | Performed by: NURSE PRACTITIONER

## 2024-07-25 NOTE — PATIENT INSTRUCTIONS
Labs and stool sample     Increase metoprolol to 100mg twice daily.   If feeling dizzy can go back down to 150mg daily.     Follow ups as planned

## 2024-07-26 NOTE — PROGRESS NOTES
[facility]   Electrophysiology Follow Up  Date: 8/1/2024    Chief Complaint   Patient presents with    Other     PVC    No cardiac symptoms at this time    3 Month Follow-Up       CC: PVC  HPI: Brittany Mccall is a 48 y.o. female with past medical history of HTN, Factor V Leiden, hypokalemia, DVT during pregnancy and ischemic stroke 11/2023 in [place] involving vision in her right eye. She was unhappy with her care in [place] so her family brought her to [facility] .     While in the hospital she had ~ 20 sec run of VT associated with Chest pain. 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 She was started on a beta blocker.      Seen in clinic with Dr. Liu 5/6/24 for PFO. cMRI ordered and noted bidirectional shunt.     Underwent PFO closure 07/03/24    S/p Successful ablation of PVC from posterior RVOT and anterior LVOT 5/14/24 7/8/24 Presented to [facility] ED with complaints of vision loss with floaters and right sided headaches. MRI and CT head unremarkable. NOEMI noted PFO device well seated.     Interval History: Brittany presents today for follow up. ECG today shows sinus rhythm. Reports she is doing better since recent hospitalization for headaches. Tolerating current medications well.     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 Doing well following ablation. Denies any known recurrence of PVC's. Will continue to monitor for this.     PAT   - 13 short episodes noted on monitor     Interatrial Defect    - cMRI with bidirectional shunt 5/19/24   - s/p PFO closure 7/3/24   - well seated per recent NOEMI 7/10/24   - on Eliquis and ASA    HTN  - Elevated: 134/96  - BP goal <130/80  - Home BP monitoring encouraged, printed information provided on how to accurately measure BP at

## 2024-07-30 DIAGNOSIS — E78.00 PURE HYPERCHOLESTEROLEMIA: ICD-10-CM

## 2024-07-30 DIAGNOSIS — R00.2 PALPITATION: ICD-10-CM

## 2024-07-30 DIAGNOSIS — R19.5 DARK STOOLS: ICD-10-CM

## 2024-07-30 LAB
DEPRECATED RDW RBC AUTO: 14.5 % (ref 12.4–15.4)
HCT VFR BLD AUTO: 36.5 % (ref 36–48)
HGB BLD-MCNC: 12.4 G/DL (ref 12–16)
MCH RBC QN AUTO: 31.2 PG (ref 26–34)
MCHC RBC AUTO-ENTMCNC: 33.8 G/DL (ref 31–36)
MCV RBC AUTO: 92.2 FL (ref 80–100)
PLATELET # BLD AUTO: 310 K/UL (ref 135–450)
PMV BLD AUTO: 8.2 FL (ref 5–10.5)
RBC # BLD AUTO: 3.96 M/UL (ref 4–5.2)
WBC # BLD AUTO: 9.7 K/UL (ref 4–11)

## 2024-07-30 RX ORDER — ATORVASTATIN CALCIUM 80 MG/1
80 TABLET, FILM COATED ORAL NIGHTLY
Qty: 30 TABLET | Refills: 0 | Status: SHIPPED | OUTPATIENT
Start: 2024-07-30

## 2024-07-31 LAB
ANION GAP SERPL CALCULATED.3IONS-SCNC: 15 MMOL/L (ref 3–16)
BUN SERPL-MCNC: 14 MG/DL (ref 7–20)
CALCIUM SERPL-MCNC: 9.9 MG/DL (ref 8.3–10.6)
CHLORIDE SERPL-SCNC: 103 MMOL/L (ref 99–110)
CO2 SERPL-SCNC: 22 MMOL/L (ref 21–32)
CREAT SERPL-MCNC: 1 MG/DL (ref 0.6–1.1)
GFR SERPLBLD CREATININE-BSD FMLA CKD-EPI: 69 ML/MIN/{1.73_M2}
GLUCOSE SERPL-MCNC: 101 MG/DL (ref 70–99)
MAGNESIUM SERPL-MCNC: 2 MG/DL (ref 1.8–2.4)
POTASSIUM SERPL-SCNC: 3.7 MMOL/L (ref 3.5–5.1)
SODIUM SERPL-SCNC: 140 MMOL/L (ref 136–145)

## 2024-08-01 ENCOUNTER — TELEPHONE (OUTPATIENT)
Dept: CARDIOLOGY CLINIC | Age: 49
End: 2024-08-01

## 2024-08-01 ENCOUNTER — OFFICE VISIT (OUTPATIENT)
Dept: CARDIOLOGY CLINIC | Age: 49
End: 2024-08-01
Payer: MEDICARE

## 2024-08-01 VITALS
BODY MASS INDEX: 33.44 KG/M2 | SYSTOLIC BLOOD PRESSURE: 134 MMHG | HEIGHT: 69 IN | HEART RATE: 62 BPM | DIASTOLIC BLOOD PRESSURE: 96 MMHG | WEIGHT: 225.8 LBS | OXYGEN SATURATION: 96 %

## 2024-08-01 DIAGNOSIS — I10 ESSENTIAL HYPERTENSION: ICD-10-CM

## 2024-08-01 DIAGNOSIS — I49.3 PVC (PREMATURE VENTRICULAR CONTRACTION): Primary | ICD-10-CM

## 2024-08-01 DIAGNOSIS — I47.19 PAT (PAROXYSMAL ATRIAL TACHYCARDIA) (HCC): ICD-10-CM

## 2024-08-01 PROCEDURE — G8427 DOCREV CUR MEDS BY ELIG CLIN: HCPCS | Performed by: INTERNAL MEDICINE

## 2024-08-01 PROCEDURE — G8417 CALC BMI ABV UP PARAM F/U: HCPCS | Performed by: INTERNAL MEDICINE

## 2024-08-01 PROCEDURE — 3080F DIAST BP >= 90 MM HG: CPT | Performed by: INTERNAL MEDICINE

## 2024-08-01 PROCEDURE — 99214 OFFICE O/P EST MOD 30 MIN: CPT | Performed by: INTERNAL MEDICINE

## 2024-08-01 PROCEDURE — 93000 ELECTROCARDIOGRAM COMPLETE: CPT | Performed by: INTERNAL MEDICINE

## 2024-08-01 PROCEDURE — 4004F PT TOBACCO SCREEN RCVD TLK: CPT | Performed by: INTERNAL MEDICINE

## 2024-08-01 PROCEDURE — 3075F SYST BP GE 130 - 139MM HG: CPT | Performed by: INTERNAL MEDICINE

## 2024-08-01 RX ORDER — VALSARTAN 320 MG/1
320 TABLET ORAL DAILY
Qty: 90 TABLET | Refills: 1 | Status: SHIPPED | OUTPATIENT
Start: 2024-08-01

## 2024-08-05 ENCOUNTER — TELEPHONE (OUTPATIENT)
Dept: CARDIOLOGY CLINIC | Age: 49
End: 2024-08-05

## 2024-08-05 NOTE — TELEPHONE ENCOUNTER
Pt called to get clarification on how to take her meds   MXA has prescribed valsartan 320 and WAK prescribed metoprolol 100mg 1.5 in am and pm.  Please call to give direction on her meds.  Thank you

## 2024-08-08 ENCOUNTER — OFFICE VISIT (OUTPATIENT)
Dept: FAMILY MEDICINE CLINIC | Age: 49
End: 2024-08-08
Payer: MEDICARE

## 2024-08-08 VITALS
SYSTOLIC BLOOD PRESSURE: 138 MMHG | DIASTOLIC BLOOD PRESSURE: 86 MMHG | HEIGHT: 69 IN | BODY MASS INDEX: 33.62 KG/M2 | HEART RATE: 72 BPM | WEIGHT: 227 LBS | OXYGEN SATURATION: 98 %

## 2024-08-08 DIAGNOSIS — I47.29 RVOT VENTRICULAR TACHYCARDIA (HCC): ICD-10-CM

## 2024-08-08 DIAGNOSIS — Z86.73 HISTORY OF CVA (CEREBROVASCULAR ACCIDENT): ICD-10-CM

## 2024-08-08 DIAGNOSIS — E78.00 PURE HYPERCHOLESTEROLEMIA: ICD-10-CM

## 2024-08-08 DIAGNOSIS — Z12.31 ENCOUNTER FOR SCREENING MAMMOGRAM FOR MALIGNANT NEOPLASM OF BREAST: ICD-10-CM

## 2024-08-08 DIAGNOSIS — I49.3 PVC (PREMATURE VENTRICULAR CONTRACTION): ICD-10-CM

## 2024-08-08 DIAGNOSIS — F41.1 GENERALIZED ANXIETY DISORDER: ICD-10-CM

## 2024-08-08 DIAGNOSIS — I47.19 PAT (PAROXYSMAL ATRIAL TACHYCARDIA) (HCC): ICD-10-CM

## 2024-08-08 DIAGNOSIS — J30.2 SEASONAL ALLERGIC RHINITIS, UNSPECIFIED TRIGGER: ICD-10-CM

## 2024-08-08 DIAGNOSIS — K21.9 GASTROESOPHAGEAL REFLUX DISEASE WITHOUT ESOPHAGITIS: ICD-10-CM

## 2024-08-08 DIAGNOSIS — Z00.00 WELCOME TO MEDICARE PREVENTIVE VISIT: Primary | ICD-10-CM

## 2024-08-08 DIAGNOSIS — I10 ESSENTIAL HYPERTENSION: ICD-10-CM

## 2024-08-08 DIAGNOSIS — H69.92 EUSTACHIAN TUBE DISORDER, LEFT: ICD-10-CM

## 2024-08-08 DIAGNOSIS — Z86.73 H/O ISCHEMIC LEFT PCA STROKE: ICD-10-CM

## 2024-08-08 PROBLEM — I20.89 ANGINA AT REST (HCC): Status: RESOLVED | Noted: 2023-12-02 | Resolved: 2024-08-08

## 2024-08-08 PROCEDURE — 3075F SYST BP GE 130 - 139MM HG: CPT | Performed by: NURSE PRACTITIONER

## 2024-08-08 PROCEDURE — 3079F DIAST BP 80-89 MM HG: CPT | Performed by: NURSE PRACTITIONER

## 2024-08-08 PROCEDURE — G0402 INITIAL PREVENTIVE EXAM: HCPCS | Performed by: NURSE PRACTITIONER

## 2024-08-08 PROCEDURE — G8427 DOCREV CUR MEDS BY ELIG CLIN: HCPCS | Performed by: NURSE PRACTITIONER

## 2024-08-08 PROCEDURE — G8417 CALC BMI ABV UP PARAM F/U: HCPCS | Performed by: NURSE PRACTITIONER

## 2024-08-08 PROCEDURE — 4004F PT TOBACCO SCREEN RCVD TLK: CPT | Performed by: NURSE PRACTITIONER

## 2024-08-08 PROCEDURE — 99214 OFFICE O/P EST MOD 30 MIN: CPT | Performed by: NURSE PRACTITIONER

## 2024-08-08 RX ORDER — ROSUVASTATIN CALCIUM 20 MG/1
20 TABLET, COATED ORAL DAILY
Qty: 90 TABLET | Refills: 1 | Status: SHIPPED | OUTPATIENT
Start: 2024-08-08

## 2024-08-08 RX ORDER — CLONAZEPAM 0.5 MG/1
0.5 TABLET ORAL 2 TIMES DAILY PRN
Qty: 14 TABLET | Refills: 0 | Status: CANCELLED | OUTPATIENT
Start: 2024-08-08 | End: 2024-08-15

## 2024-08-08 RX ORDER — ATORVASTATIN CALCIUM 80 MG/1
80 TABLET, FILM COATED ORAL NIGHTLY
Qty: 90 TABLET | Refills: 2 | Status: CANCELLED | OUTPATIENT
Start: 2024-08-08

## 2024-08-08 RX ORDER — CETIRIZINE HYDROCHLORIDE 10 MG/1
TABLET ORAL
Qty: 90 TABLET | Refills: 2 | Status: SHIPPED | OUTPATIENT
Start: 2024-08-08

## 2024-08-08 RX ORDER — METOPROLOL SUCCINATE 100 MG/1
TABLET, EXTENDED RELEASE ORAL
Qty: 180 TABLET | Refills: 2 | Status: SHIPPED | OUTPATIENT
Start: 2024-08-08

## 2024-08-08 RX ORDER — VALSARTAN 320 MG/1
320 TABLET ORAL DAILY
Qty: 90 TABLET | Refills: 2 | Status: SHIPPED | OUTPATIENT
Start: 2024-08-08

## 2024-08-08 RX ORDER — FLUTICASONE PROPIONATE 50 MCG
2 SPRAY, SUSPENSION (ML) NASAL DAILY
Qty: 16 G | Refills: 0 | Status: SHIPPED | OUTPATIENT
Start: 2024-08-08

## 2024-08-08 RX ORDER — ASPIRIN 81 MG/1
81 TABLET ORAL DAILY
Qty: 90 TABLET | Refills: 3 | Status: SHIPPED | OUTPATIENT
Start: 2024-08-08

## 2024-08-08 RX ORDER — ESOMEPRAZOLE MAGNESIUM 40 MG/1
CAPSULE, DELAYED RELEASE ORAL
Qty: 90 CAPSULE | Refills: 2 | Status: SHIPPED | OUTPATIENT
Start: 2024-08-08

## 2024-08-08 ASSESSMENT — PATIENT HEALTH QUESTIONNAIRE - PHQ9
2. FEELING DOWN, DEPRESSED OR HOPELESS: SEVERAL DAYS
1. LITTLE INTEREST OR PLEASURE IN DOING THINGS: SEVERAL DAYS
4. FEELING TIRED OR HAVING LITTLE ENERGY: SEVERAL DAYS
9. THOUGHTS THAT YOU WOULD BE BETTER OFF DEAD, OR OF HURTING YOURSELF: NOT AT ALL
SUM OF ALL RESPONSES TO PHQ QUESTIONS 1-9: 3
10. IF YOU CHECKED OFF ANY PROBLEMS, HOW DIFFICULT HAVE THESE PROBLEMS MADE IT FOR YOU TO DO YOUR WORK, TAKE CARE OF THINGS AT HOME, OR GET ALONG WITH OTHER PEOPLE: NOT DIFFICULT AT ALL
7. TROUBLE CONCENTRATING ON THINGS, SUCH AS READING THE NEWSPAPER OR WATCHING TELEVISION: NOT AT ALL
3. TROUBLE FALLING OR STAYING ASLEEP: NOT AT ALL
5. POOR APPETITE OR OVEREATING: NOT AT ALL
SUM OF ALL RESPONSES TO PHQ QUESTIONS 1-9: 3
8. MOVING OR SPEAKING SO SLOWLY THAT OTHER PEOPLE COULD HAVE NOTICED. OR THE OPPOSITE, BEING SO FIGETY OR RESTLESS THAT YOU HAVE BEEN MOVING AROUND A LOT MORE THAN USUAL: NOT AT ALL
6. FEELING BAD ABOUT YOURSELF - OR THAT YOU ARE A FAILURE OR HAVE LET YOURSELF OR YOUR FAMILY DOWN: NOT AT ALL
SUM OF ALL RESPONSES TO PHQ QUESTIONS 1-9: 3
SUM OF ALL RESPONSES TO PHQ QUESTIONS 1-9: 3
SUM OF ALL RESPONSES TO PHQ9 QUESTIONS 1 & 2: 2

## 2024-08-08 ASSESSMENT — LIFESTYLE VARIABLES
HOW MANY STANDARD DRINKS CONTAINING ALCOHOL DO YOU HAVE ON A TYPICAL DAY: PATIENT DOES NOT DRINK
HOW OFTEN DO YOU HAVE A DRINK CONTAINING ALCOHOL: NEVER

## 2024-08-08 NOTE — PROGRESS NOTES
been reviewed and are found in Flowsheets. The following problems were reviewed today and where indicated follow up appointments were made and/or referrals ordered.    Positive Risk Factor Screenings with Interventions:                Inactivity:  On average, how many days per week do you engage in moderate to strenuous exercise (like a brisk walk)?: 2 days (!) Abnormal  On average, how many minutes do you engage in exercise at this level?: 10 min  Interventions:  Not ready to increase exercise at this time due to recovery from cardiac surgery last month.  Encouraged to try to increase to goal of 30 minutes of exercise every other day as tolerated     Abnormal BMI (obese):  Body mass index is 33.52 kg/m². (!) Abnormal  Interventions:  Recommended increasing exercise once able as above.  Encouraged low-carb diet as well as lowering of portion sizes.        Dentist Screen:  Have you seen the dentist within the past year?: (!) No    Intervention:  Advised to schedule with their dentist      Safety:  Do you have non-slip mats or non-slip surfaces or shower bars or grab bars in your shower or bathtub?: (!) No  Interventions:  Home safety tips provided     Advanced Directives:  Do you have a Living Will?: (!) No    Intervention:  has NO advanced directive - information provided        Tobacco Use:    Tobacco Use      Smoking status: Some Days        Packs/day: 1.00        Years: 1 pack/day for 10.6 years (10.6 ttl pk-yrs)        Types: Cigarettes        Start date: 2014        Passive exposure: Current      Smokeless tobacco: Never     Interventions:  Patient declined any further intervention or treatment                      Objective   Vitals:    08/08/24 1335   BP: 138/86   Site: Right Upper Arm   Position: Sitting   Cuff Size: Large Adult   Pulse: 72   SpO2: 98%   Weight: 103 kg (227 lb)   Height: 1.753 m (5' 9\")      Body mass index is 33.52 kg/m².        General Appearance: Obese.  Alert and oriented to person,

## 2024-08-08 NOTE — PATIENT INSTRUCTIONS
[email or web address] and enter F075 to learn more about \"A Healthy Heart: Care Instructions.\"  Current as of: June 24, 2023  Content Version: 14.1  © 0996-5808 [employer].   Care instructions adapted under license by [employer]. If you have questions about a medical condition or this instruction, always ask your healthcare professional. [employer] disclaims any warranty or liability for your use of this information.      Personalized Preventive Plan for Brittany Mccall - 8/8/2024  Medicare offers a range of preventive health benefits. Some of the tests and screenings are paid in full while other may be subject to a deductible, co-insurance, and/or copay.    Some of these benefits include a comprehensive review of your medical history including lifestyle, illnesses that may run in your family, and various assessments and screenings as appropriate.    After reviewing your medical record and screening and assessments performed today your provider may have ordered immunizations, labs, imaging, and/or referrals for you.  A list of these orders (if applicable) as well as your Preventive Care list are included within your After Visit Summary for your review.    Other Preventive Recommendations:    A preventive eye exam performed by an eye specialist is recommended every 1-2 years to screen for glaucoma; cataracts, macular degeneration, and other eye disorders.  A preventive dental visit is recommended every 6 months.  Try to get at least 150 minutes of exercise per week or 10,000 steps per day on a pedometer .  Order or download the FREE \"Exercise & Physical Activity: Your Everyday Guide\" from [employer] [place]. Call 1-365.736.4145 or search [employer] [place] [employer] online.  You need 8015-4349 mg of calcium and 4317-7750 IU of vitamin D per day. It is possible to meet your calcium requirement with diet alone, but a vitamin D supplement is usually necessary

## 2024-08-22 DIAGNOSIS — I10 ESSENTIAL HYPERTENSION: ICD-10-CM

## 2024-08-22 LAB
ANION GAP SERPL CALCULATED.3IONS-SCNC: 12 MMOL/L (ref 3–16)
BUN SERPL-MCNC: 10 MG/DL (ref 7–20)
CALCIUM SERPL-MCNC: 9.7 MG/DL (ref 8.3–10.6)
CHLORIDE SERPL-SCNC: 104 MMOL/L (ref 99–110)
CO2 SERPL-SCNC: 25 MMOL/L (ref 21–32)
CREAT SERPL-MCNC: 1 MG/DL (ref 0.6–1.1)
GFR SERPLBLD CREATININE-BSD FMLA CKD-EPI: 69 ML/MIN/{1.73_M2}
GLUCOSE SERPL-MCNC: 79 MG/DL (ref 70–99)
POTASSIUM SERPL-SCNC: 3.9 MMOL/L (ref 3.5–5.1)
SODIUM SERPL-SCNC: 141 MMOL/L (ref 136–145)

## 2024-09-19 NOTE — PROGRESS NOTES
aortic dissection.    Mild subsegmental atelectasis in the dependent portion of the lungs.  Mild  gastroesophageal reflux    CTA HEAD NECK W CONTRAST 12/02/2023 10:52 AM (Final)  COMPARISON:  12/01/2023    Reason for exam:->acute posterior neck pain, rule out vertebral dissection    Impression  1. No vertebral artery dissection identified.  2. Short segment severe stenosis of the P2 segment of the left posterior  cerebral artery, unchanged.  3. Otherwise, unremarkable CT angiogram of the head and neck.    Signed by: Lux Silva MD on 12/2/2023 10:52 AM    11/28/23: MRI brain  CONCLUSION:   1. Acute infarct within the left PCA distribution with involvement of the occipital lobe.  Results called to Dr. Solis on the current date at 9:30 p.m. Eastern Standard Time.   2. No hemorrhage.        Outside/Care everywhere records Reviewed  Labs Reviewed  Prior Imaging, ekg, cath, echo reviewed when available  Medications reviewed  Old Notes reviewed  ASSESSMENT AND PLAN     Encounter Diagnoses   Name Primary?    PVC (premature ventricular contraction) Yes    RVOT ventricular tachycardia (HCC)     H/O ischemic left PCA stroke     Essential hypertension     Pure hypercholesterolemia     Smoking     Cryptogenic stroke (HCC)     Atrial septal defect     S/P device closure of atrial septal defect     Complication of cardiac device, unspecified complication, subsequent encounter     Abnormal findings on diagnostic imaging of heart and coronary circulation      PVC/VT - s/p ablation.  Likely no longer needs beta blocker for this.  Will change metoprolol to carvedilol for better BP control.  Chest pain - resolved.  Prior episode likely due to her known VT.  Minimal CAD risk factors.  Cryptogenic stroke - s/p PFO closure with recurrent event < 2 weeks after PFO closure, with evidence that PFO device may not have fully sealed. Continue Eliquis, repeat NOEMI in January 2025 (6 months after prior NOEMI).  If PFO sealed can then

## 2024-10-03 ENCOUNTER — TELEPHONE (OUTPATIENT)
Dept: CARDIOLOGY CLINIC | Age: 49
End: 2024-10-03

## 2024-10-03 ENCOUNTER — OFFICE VISIT (OUTPATIENT)
Dept: CARDIOLOGY CLINIC | Age: 49
End: 2024-10-03
Payer: MEDICARE

## 2024-10-03 VITALS
OXYGEN SATURATION: 97 % | HEIGHT: 69 IN | DIASTOLIC BLOOD PRESSURE: 90 MMHG | SYSTOLIC BLOOD PRESSURE: 184 MMHG | BODY MASS INDEX: 32.58 KG/M2 | WEIGHT: 220 LBS | HEART RATE: 59 BPM

## 2024-10-03 DIAGNOSIS — Z87.74 S/P DEVICE CLOSURE OF ATRIAL SEPTAL DEFECT: ICD-10-CM

## 2024-10-03 DIAGNOSIS — F17.200 SMOKING: ICD-10-CM

## 2024-10-03 DIAGNOSIS — Z86.73 H/O ISCHEMIC LEFT PCA STROKE: ICD-10-CM

## 2024-10-03 DIAGNOSIS — I63.9 CRYPTOGENIC STROKE (HCC): ICD-10-CM

## 2024-10-03 DIAGNOSIS — Q21.10 ATRIAL SEPTAL DEFECT: ICD-10-CM

## 2024-10-03 DIAGNOSIS — R93.1 ABNORMAL FINDINGS ON DIAGNOSTIC IMAGING OF HEART AND CORONARY CIRCULATION: ICD-10-CM

## 2024-10-03 DIAGNOSIS — I47.29 RVOT VENTRICULAR TACHYCARDIA (HCC): ICD-10-CM

## 2024-10-03 DIAGNOSIS — I10 ESSENTIAL HYPERTENSION: ICD-10-CM

## 2024-10-03 DIAGNOSIS — E78.00 PURE HYPERCHOLESTEROLEMIA: ICD-10-CM

## 2024-10-03 DIAGNOSIS — I49.3 PVC (PREMATURE VENTRICULAR CONTRACTION): Primary | ICD-10-CM

## 2024-10-03 DIAGNOSIS — T82.9XXD: ICD-10-CM

## 2024-10-03 PROCEDURE — 3080F DIAST BP >= 90 MM HG: CPT | Performed by: INTERNAL MEDICINE

## 2024-10-03 PROCEDURE — 4004F PT TOBACCO SCREEN RCVD TLK: CPT | Performed by: INTERNAL MEDICINE

## 2024-10-03 PROCEDURE — 99214 OFFICE O/P EST MOD 30 MIN: CPT | Performed by: INTERNAL MEDICINE

## 2024-10-03 PROCEDURE — 3077F SYST BP >= 140 MM HG: CPT | Performed by: INTERNAL MEDICINE

## 2024-10-03 PROCEDURE — G8427 DOCREV CUR MEDS BY ELIG CLIN: HCPCS | Performed by: INTERNAL MEDICINE

## 2024-10-03 PROCEDURE — G8484 FLU IMMUNIZE NO ADMIN: HCPCS | Performed by: INTERNAL MEDICINE

## 2024-10-03 PROCEDURE — G8417 CALC BMI ABV UP PARAM F/U: HCPCS | Performed by: INTERNAL MEDICINE

## 2024-10-03 RX ORDER — CARVEDILOL 12.5 MG/1
12.5 TABLET ORAL 2 TIMES DAILY
Qty: 180 TABLET | Refills: 3 | Status: SHIPPED | OUTPATIENT
Start: 2024-10-03

## 2024-10-03 NOTE — TELEPHONE ENCOUNTER
----- Message from LUC CRUZ RN sent at 10/3/2024  1:53 PM EDT -----  Regarding: NOEMI García,     Can you set her up for a NOEMI with RAYMOND in mid January.     Thank you!

## 2024-10-03 NOTE — PATIENT INSTRUCTIONS
Follow up with Dr Ewing in end of January     NOEMI mid January - I will send a message to the  and she will call you to set up.     Discontinue Metoprolol     Start: Carvedilol 12.5mg twice daily     Continue to work on smoking cessation.     Call for any questions or concerns.

## 2024-10-10 NOTE — TELEPHONE ENCOUNTER
Date of Procedure: Thursday 1/9/25 @ [facility] with Dr. Ewing     Time of arrival: 7:00 am     Procedure time: 8:00 am     Called and spoke to Brittany and she is agreeable to date and time. Reviewed instructions and she verbalized understanding. Encouraged to call with any questions or concerns.     Published on [employer], scheduled in Epic and e-mailed to cath lab.

## 2024-10-17 DIAGNOSIS — H69.92 EUSTACHIAN TUBE DISORDER, LEFT: ICD-10-CM

## 2024-10-17 RX ORDER — FLUTICASONE PROPIONATE 50 MCG
SPRAY, SUSPENSION (ML) NASAL
Qty: 16 G | Refills: 0 | Status: SHIPPED | OUTPATIENT
Start: 2024-10-17

## 2024-10-31 ENCOUNTER — TELEPHONE (OUTPATIENT)
Dept: CARDIOLOGY CLINIC | Age: 49
End: 2024-10-31

## 2024-10-31 ENCOUNTER — APPOINTMENT (OUTPATIENT)
Dept: GENERAL RADIOLOGY | Age: 49
End: 2024-10-31
Payer: MEDICARE

## 2024-10-31 ENCOUNTER — APPOINTMENT (OUTPATIENT)
Dept: MRI IMAGING | Age: 49
End: 2024-10-31
Payer: MEDICARE

## 2024-10-31 ENCOUNTER — HOSPITAL ENCOUNTER (EMERGENCY)
Age: 49
Discharge: HOME | End: 2024-10-31
Attending: EMERGENCY MEDICINE
Payer: MEDICARE

## 2024-10-31 VITALS
TEMPERATURE: 97.8 F | DIASTOLIC BLOOD PRESSURE: 76 MMHG | WEIGHT: 217 LBS | OXYGEN SATURATION: 96 % | BODY MASS INDEX: 32.14 KG/M2 | SYSTOLIC BLOOD PRESSURE: 141 MMHG | HEART RATE: 67 BPM | RESPIRATION RATE: 20 BRPM | HEIGHT: 69 IN

## 2024-10-31 DIAGNOSIS — S39.012A STRAIN OF LUMBAR REGION, INITIAL ENCOUNTER: Primary | ICD-10-CM

## 2024-10-31 DIAGNOSIS — R19.7 DIARRHEA, UNSPECIFIED TYPE: ICD-10-CM

## 2024-10-31 LAB
ALBUMIN SERPL-MCNC: 4.4 G/DL (ref 3.4–5)
ALBUMIN/GLOB SERPL: 1.3 {RATIO} (ref 1.1–2.2)
ALP SERPL-CCNC: 96 U/L (ref 40–129)
ALT SERPL-CCNC: 8 U/L (ref 10–40)
ANION GAP SERPL CALCULATED.3IONS-SCNC: 14 MMOL/L (ref 3–16)
AST SERPL-CCNC: 16 U/L (ref 15–37)
BACTERIA URNS QL MICRO: ABNORMAL /HPF
BASOPHILS # BLD: 0.2 K/UL (ref 0–0.2)
BASOPHILS NFR BLD: 1.3 %
BILIRUB SERPL-MCNC: 0.3 MG/DL (ref 0–1)
BILIRUB UR QL STRIP.AUTO: NEGATIVE
BUN SERPL-MCNC: 10 MG/DL (ref 7–20)
CALCIUM SERPL-MCNC: 10.4 MG/DL (ref 8.3–10.6)
CHLORIDE SERPL-SCNC: 104 MMOL/L (ref 99–110)
CLARITY UR: ABNORMAL
CO2 SERPL-SCNC: 21 MMOL/L (ref 21–32)
COLOR UR: YELLOW
CREAT SERPL-MCNC: 0.9 MG/DL (ref 0.6–1.1)
DEPRECATED RDW RBC AUTO: 14.6 % (ref 12.4–15.4)
EOSINOPHIL # BLD: 0.3 K/UL (ref 0–0.6)
EOSINOPHIL NFR BLD: 2.7 %
EPI CELLS #/AREA URNS AUTO: 16 /HPF (ref 0–5)
FLUAV RNA RESP QL NAA+PROBE: NOT DETECTED
FLUBV RNA RESP QL NAA+PROBE: NOT DETECTED
GFR SERPLBLD CREATININE-BSD FMLA CKD-EPI: 78 ML/MIN/{1.73_M2}
GLUCOSE SERPL-MCNC: 112 MG/DL (ref 70–99)
GLUCOSE UR STRIP.AUTO-MCNC: NEGATIVE MG/DL
HCT VFR BLD AUTO: 37.2 % (ref 36–48)
HGB BLD-MCNC: 13 G/DL (ref 12–16)
HGB UR QL STRIP.AUTO: NEGATIVE
HYALINE CASTS #/AREA URNS AUTO: 0 /LPF (ref 0–8)
KETONES UR STRIP.AUTO-MCNC: ABNORMAL MG/DL
LEUKOCYTE ESTERASE UR QL STRIP.AUTO: NEGATIVE
LYMPHOCYTES # BLD: 3.9 K/UL (ref 1–5.1)
LYMPHOCYTES NFR BLD: 34.3 %
MAGNESIUM SERPL-MCNC: 1.96 MG/DL (ref 1.8–2.4)
MCH RBC QN AUTO: 31.2 PG (ref 26–34)
MCHC RBC AUTO-ENTMCNC: 34.8 G/DL (ref 31–36)
MCV RBC AUTO: 89.8 FL (ref 80–100)
MONOCYTES # BLD: 0.8 K/UL (ref 0–1.3)
MONOCYTES NFR BLD: 7 %
NEUTROPHILS # BLD: 6.2 K/UL (ref 1.7–7.7)
NEUTROPHILS NFR BLD: 54.7 %
NITRITE UR QL STRIP.AUTO: NEGATIVE
PH UR STRIP.AUTO: 6 [PH] (ref 5–8)
PLATELET # BLD AUTO: 358 K/UL (ref 135–450)
PMV BLD AUTO: 7.4 FL (ref 5–10.5)
POTASSIUM SERPL-SCNC: 3.3 MMOL/L (ref 3.5–5.1)
PROT SERPL-MCNC: 7.8 G/DL (ref 6.4–8.2)
PROT UR STRIP.AUTO-MCNC: ABNORMAL MG/DL
RBC # BLD AUTO: 4.15 M/UL (ref 4–5.2)
RBC CLUMPS #/AREA URNS AUTO: 4 /HPF (ref 0–4)
SARS-COV-2 RNA RESP QL NAA+PROBE: NOT DETECTED
SODIUM SERPL-SCNC: 139 MMOL/L (ref 136–145)
SP GR UR STRIP.AUTO: 1.01 (ref 1–1.03)
UA COMPLETE W REFLEX CULTURE PNL UR: ABNORMAL
UA DIPSTICK W REFLEX MICRO PNL UR: YES
URN SPEC COLLECT METH UR: ABNORMAL
UROBILINOGEN UR STRIP-ACNC: 1 E.U./DL
WBC # BLD AUTO: 11.3 K/UL (ref 4–11)
WBC #/AREA URNS AUTO: 1 /HPF (ref 0–5)

## 2024-10-31 PROCEDURE — 96375 TX/PRO/DX INJ NEW DRUG ADDON: CPT

## 2024-10-31 PROCEDURE — 96374 THER/PROPH/DIAG INJ IV PUSH: CPT

## 2024-10-31 PROCEDURE — 83735 ASSAY OF MAGNESIUM: CPT

## 2024-10-31 PROCEDURE — 72158 MRI LUMBAR SPINE W/O & W/DYE: CPT

## 2024-10-31 PROCEDURE — 6360000002 HC RX W HCPCS: Performed by: PHYSICIAN ASSISTANT

## 2024-10-31 PROCEDURE — 71046 X-RAY EXAM CHEST 2 VIEWS: CPT

## 2024-10-31 PROCEDURE — 6360000004 HC RX CONTRAST MEDICATION: Performed by: PHYSICIAN ASSISTANT

## 2024-10-31 PROCEDURE — 6370000000 HC RX 637 (ALT 250 FOR IP): Performed by: PHYSICIAN ASSISTANT

## 2024-10-31 PROCEDURE — 85025 COMPLETE CBC W/AUTO DIFF WBC: CPT

## 2024-10-31 PROCEDURE — A9577 INJ MULTIHANCE: HCPCS | Performed by: PHYSICIAN ASSISTANT

## 2024-10-31 PROCEDURE — 81001 URINALYSIS AUTO W/SCOPE: CPT

## 2024-10-31 PROCEDURE — 93005 ELECTROCARDIOGRAM TRACING: CPT | Performed by: PHYSICIAN ASSISTANT

## 2024-10-31 PROCEDURE — 87636 SARSCOV2 & INF A&B AMP PRB: CPT

## 2024-10-31 PROCEDURE — 80053 COMPREHEN METABOLIC PANEL: CPT

## 2024-10-31 PROCEDURE — 99285 EMERGENCY DEPT VISIT HI MDM: CPT

## 2024-10-31 RX ORDER — ONDANSETRON 2 MG/ML
4 INJECTION INTRAMUSCULAR; INTRAVENOUS EVERY 6 HOURS PRN
Status: DISCONTINUED | OUTPATIENT
Start: 2024-10-31 | End: 2024-10-31 | Stop reason: HOSPADM

## 2024-10-31 RX ORDER — LIDOCAINE 50 MG/G
1 PATCH TOPICAL DAILY
Qty: 10 PATCH | Refills: 0 | Status: SHIPPED | OUTPATIENT
Start: 2024-10-31 | End: 2024-11-10

## 2024-10-31 RX ORDER — LIDOCAINE 4 G/G
1 PATCH TOPICAL DAILY
Status: DISCONTINUED | OUTPATIENT
Start: 2024-10-31 | End: 2024-10-31 | Stop reason: HOSPADM

## 2024-10-31 RX ORDER — LORAZEPAM 1 MG/1
1 TABLET ORAL EVERY 4 HOURS PRN
Status: DISCONTINUED | OUTPATIENT
Start: 2024-10-31 | End: 2024-10-31 | Stop reason: HOSPADM

## 2024-10-31 RX ORDER — KETOROLAC TROMETHAMINE 10 MG/1
10 TABLET, FILM COATED ORAL EVERY 6 HOURS PRN
Qty: 20 TABLET | Refills: 0 | Status: SHIPPED | OUTPATIENT
Start: 2024-10-31

## 2024-10-31 RX ORDER — CYCLOBENZAPRINE HCL 5 MG
5 TABLET ORAL 2 TIMES DAILY PRN
Qty: 10 TABLET | Refills: 0 | Status: SHIPPED | OUTPATIENT
Start: 2024-10-31 | End: 2024-11-10

## 2024-10-31 RX ORDER — KETOROLAC TROMETHAMINE 30 MG/ML
30 INJECTION, SOLUTION INTRAMUSCULAR; INTRAVENOUS ONCE
Status: COMPLETED | OUTPATIENT
Start: 2024-10-31 | End: 2024-10-31

## 2024-10-31 RX ORDER — LOPERAMIDE HYDROCHLORIDE 2 MG/1
2 CAPSULE ORAL 4 TIMES DAILY PRN
Qty: 12 CAPSULE | Refills: 0 | Status: SHIPPED | OUTPATIENT
Start: 2024-10-31 | End: 2024-11-03

## 2024-10-31 RX ADMIN — GADOBENATE DIMEGLUMINE 20 ML: 529 INJECTION, SOLUTION INTRAVENOUS at 14:53

## 2024-10-31 RX ADMIN — LORAZEPAM 1 MG: 1 TABLET ORAL at 14:20

## 2024-10-31 RX ADMIN — ONDANSETRON 4 MG: 2 INJECTION, SOLUTION INTRAMUSCULAR; INTRAVENOUS at 15:07

## 2024-10-31 RX ADMIN — KETOROLAC TROMETHAMINE 30 MG: 30 INJECTION, SOLUTION INTRAMUSCULAR at 15:00

## 2024-10-31 ASSESSMENT — ENCOUNTER SYMPTOMS
VOMITING: 0
BACK PAIN: 1
COUGH: 0
SHORTNESS OF BREATH: 0
CONSTIPATION: 0
NAUSEA: 0
SORE THROAT: 0
EYE PAIN: 0
DIARRHEA: 0
RHINORRHEA: 0
ABDOMINAL PAIN: 0

## 2024-10-31 ASSESSMENT — PAIN DESCRIPTION - ORIENTATION: ORIENTATION: LOWER

## 2024-10-31 ASSESSMENT — PAIN SCALES - GENERAL
PAINLEVEL_OUTOF10: 7
PAINLEVEL_OUTOF10: 4
PAINLEVEL_OUTOF10: 8

## 2024-10-31 ASSESSMENT — PAIN DESCRIPTION - LOCATION: LOCATION: BACK

## 2024-10-31 ASSESSMENT — PAIN - FUNCTIONAL ASSESSMENT: PAIN_FUNCTIONAL_ASSESSMENT: 0-10

## 2024-10-31 NOTE — TELEPHONE ENCOUNTER
Pt called in stating her lower back has been hurting her for the last 2-3 weeks, pt was concerned as her and RAYMOND had discussed that she call If she had lower back pain   Please call pt and advise further steps  Thank you

## 2024-10-31 NOTE — ED PROVIDER NOTES
No cauda equina compression.      No acute lumbar spine abnormality.         XR CHEST (2 VW)   Final Result      Lungs are clear           No results found.    No results found.    PROCEDURES   Unless otherwise noted below, none     Procedures    CRITICAL CARE TIME (.cctime)       PAST MEDICAL HISTORY      has a past medical history of Anxiety, Cerebral artery occlusion with cerebral infarction (HCC) (11/28/23), DVT (deep vein thrombosis) in pregnancy, Hypertension, and Low blood potassium.     Chronic Conditions affecting Care:     EMERGENCY DEPARTMENT COURSE and DIFFERENTIAL DIAGNOSIS/MDM:   Vitals:    Vitals:    10/31/24 1300 10/31/24 1505 10/31/24 1514   BP: (!) 211/128 (!) 146/81 (!) 141/76   Pulse: 86 67    Resp: 20     Temp: 97.8 °F (36.6 °C)     TempSrc: Oral     SpO2: 97% 95% 96%   Weight: 98.4 kg (217 lb)     Height: 1.753 m (5' 9\")         Patient was given the following medications:  Medications   lidocaine 4 % external patch 1 patch (1 patch TransDERmal Patch Applied 10/31/24 1321)   LORazepam (ATIVAN) tablet 1 mg (1 mg Oral Given 10/31/24 1420)   ondansetron (ZOFRAN) injection 4 mg (4 mg IntraVENous Given 10/31/24 1507)   gadobenate dimeglumine (MULTIHANCE) injection 20 mL (20 mLs IntraVENous Given 10/31/24 1453)   ketorolac (TORADOL) injection 30 mg (30 mg IntraVENous Given 10/31/24 1500)             Is this patient to be included in the SEP-1 Core Measure due to severe sepsis or septic shock?   No   Exclusion criteria - the patient is NOT to be included for SEP-1 Core Measure due to:  Infection is not suspected    CONSULTS: (Who and What was discussed)  None  Discussion with Other Profesionals : None    Social Determinants : None    Records Reviewed : None    CC/HPI Summary, DDx, ED Course, and Reassessment: Briefly this is a 49-year-old female who presents emergency room due to lower back pain has been going on for the last 2 weeks.  She also noticed diarrhea.  She denies abdominal pain nausea or  MEDICATIONS:  New Prescriptions    CYCLOBENZAPRINE (FLEXERIL) 5 MG TABLET    Take 1 tablet by mouth 2 times daily as needed for Muscle spasms    KETOROLAC (TORADOL) 10 MG TABLET    Take 1 tablet by mouth every 6 hours as needed for Pain    LIDOCAINE (LIDODERM) 5 %    Place 1 patch onto the skin daily for 10 days 12 hours on, 12 hours off.    LOPERAMIDE (IMODIUM) 2 MG CAPSULE    Take 1 capsule by mouth 4 times daily as needed for Diarrhea       DISCONTINUED MEDICATIONS:  Discontinued Medications    No medications on file              (Please note that portions of this note were completed with a voice recognition program.  Efforts were made to edit the dictations but occasionally words are mis-transcribed.)    TRINA Bethea (electronically signed)           Otto Moeller PA  10/31/24 7450

## 2024-10-31 NOTE — DISCHARGE INSTRUCTIONS
Follow up with your primary care provider in one week for a recheck    Take medications as prescribed. Be advised that the muscle relaxants combined improved to make you drowsy    Return to the ED if you have any worsening symptoms.

## 2024-10-31 NOTE — ED PROVIDER NOTES
This patient was seen by the Mid-Level Provider. I have seen and examined the patient, agree with the workup, evaluation, management and diagnosis. Care plan has been discussed. My assessment reveals a 49-year-old female who presents with multiple complaints.  This is a 49-year-old female who presents with lower back pain she has had for the last several weeks.  She also states has been having some diarrhea recently as well.  The patient states that she coughs really hard she will soil her pants.  She states that over-the-counter diarrhea medications do help with this but when they wear off she has many episodes of diarrhea.  She denies any perineal numbness or paresthesias.  She denies any trauma.  She also states has been coughing a lot and is concerned about pneumonia.          Radiology results:    MRI LUMBAR SPINE W WO CONTRAST   Final Result   No cauda equina compression.      No acute lumbar spine abnormality.         XR CHEST (2 VW)   Final Result      Lungs are clear               LABS:    Labs Reviewed   CBC WITH AUTO DIFFERENTIAL - Abnormal; Notable for the following components:       Result Value    WBC 11.3 (*)     All other components within normal limits   COMPREHENSIVE METABOLIC PANEL W/ REFLEX TO MG FOR LOW K - Abnormal; Notable for the following components:    Potassium reflex Magnesium 3.3 (*)     Glucose 112 (*)     ALT 8 (*)     All other components within normal limits   URINALYSIS WITH REFLEX TO CULTURE - Abnormal; Notable for the following components:    Clarity, UA CLOUDY (*)     Ketones, Urine TRACE (*)     Protein, UA TRACE (*)     All other components within normal limits   MICROSCOPIC URINALYSIS - Abnormal; Notable for the following components:    Epithelial Cells, UA 16 (*)     All other components within normal limits   COVID-19 & INFLUENZA COMBO   MAGNESIUM           EKG:    Sinus rhythm at a rate of 72 beats a minute with no acute ST elevations or depressions or pathologic U waves.   Normal axis.    Exam:    Well-nourished female no acute distress.  She ambulated from the triage area to her examining room without difficulty.  She had good reflexes bilaterally.  She had no focal motor or sensory deficits throughout.      Medical decision makin-year-old female presents with multiple complaints.  It sounds like her main complaint is that of some lower back pain.  In addition, she also feels bad all over and has been having diarrhea.  We did do an MRI of the patient's back that shows no significant findings.  She is not dehydrated.  Her workup was otherwise unremarkable.  The patient be discharged with instructions to take over-the-counter antidiarrheals and was referred back to her primary care physician with instructions to drink plenty of fluids for hydration.  She is to return if worse.    Is this patient to be included in the SEP-1 Core Measure due to severe sepsis or septic shock?   No     Exclusion criteria - the patient is NOT to be included for SEP-1 Core Measure due to:  Infection is not suspected      FINAL IMPRESSION:    1. Strain of lumbar region, initial encounter    2. Diarrhea, unspecified type           Jono Pearce MD  10/31/24 2275

## 2024-11-02 LAB
EKG ATRIAL RATE: 72 BPM
EKG DIAGNOSIS: NORMAL
EKG P AXIS: 91 DEGREES
EKG P-R INTERVAL: 148 MS
EKG Q-T INTERVAL: 414 MS
EKG QRS DURATION: 80 MS
EKG QTC CALCULATION (BAZETT): 453 MS
EKG R AXIS: 59 DEGREES
EKG T AXIS: 57 DEGREES
EKG VENTRICULAR RATE: 72 BPM

## 2024-11-02 PROCEDURE — 93010 ELECTROCARDIOGRAM REPORT: CPT | Performed by: INTERNAL MEDICINE

## 2024-11-18 ENCOUNTER — APPOINTMENT (OUTPATIENT)
Dept: GENERAL RADIOLOGY | Age: 49
End: 2024-11-18
Payer: MEDICARE

## 2024-11-18 ENCOUNTER — HOSPITAL ENCOUNTER (EMERGENCY)
Age: 49
Discharge: HOME | End: 2024-11-18
Attending: EMERGENCY MEDICINE
Payer: MEDICARE

## 2024-11-18 ENCOUNTER — APPOINTMENT (OUTPATIENT)
Dept: CT IMAGING | Age: 49
End: 2024-11-18
Payer: MEDICARE

## 2024-11-18 ENCOUNTER — TELEPHONE (OUTPATIENT)
Dept: FAMILY MEDICINE CLINIC | Age: 49
End: 2024-11-18

## 2024-11-18 VITALS
HEIGHT: 69 IN | OXYGEN SATURATION: 100 % | SYSTOLIC BLOOD PRESSURE: 146 MMHG | WEIGHT: 227 LBS | TEMPERATURE: 98.2 F | DIASTOLIC BLOOD PRESSURE: 69 MMHG | BODY MASS INDEX: 33.62 KG/M2 | RESPIRATION RATE: 15 BRPM | HEART RATE: 75 BPM

## 2024-11-18 DIAGNOSIS — I31.39 PERICARDIAL EFFUSION: ICD-10-CM

## 2024-11-18 DIAGNOSIS — R07.9 LEFT-SIDED CHEST PAIN: Primary | ICD-10-CM

## 2024-11-18 LAB
ALBUMIN SERPL-MCNC: 4.4 G/DL (ref 3.4–5)
ALBUMIN/GLOB SERPL: 1.3 {RATIO} (ref 1.1–2.2)
ALP SERPL-CCNC: 101 U/L (ref 40–129)
ALT SERPL-CCNC: 9 U/L (ref 10–40)
ANION GAP SERPL CALCULATED.3IONS-SCNC: 15 MMOL/L (ref 3–16)
APTT BLD: 27.2 SEC (ref 22.1–36.4)
AST SERPL-CCNC: 20 U/L (ref 15–37)
BASOPHILS # BLD: 0.2 K/UL (ref 0–0.2)
BASOPHILS NFR BLD: 1.2 %
BILIRUB SERPL-MCNC: 0.3 MG/DL (ref 0–1)
BILIRUB UR QL STRIP.AUTO: NEGATIVE
BUN SERPL-MCNC: 9 MG/DL (ref 7–20)
CALCIUM SERPL-MCNC: 10.4 MG/DL (ref 8.3–10.6)
CHLORIDE SERPL-SCNC: 103 MMOL/L (ref 99–110)
CLARITY UR: CLEAR
CO2 SERPL-SCNC: 24 MMOL/L (ref 21–32)
COLOR UR: YELLOW
CREAT SERPL-MCNC: 0.9 MG/DL (ref 0.6–1.1)
CRP SERPL-MCNC: 16.5 MG/L (ref 0–5.1)
DEPRECATED RDW RBC AUTO: 14.8 % (ref 12.4–15.4)
EOSINOPHIL # BLD: 0.3 K/UL (ref 0–0.6)
EOSINOPHIL NFR BLD: 2.1 %
ERYTHROCYTE [SEDIMENTATION RATE] IN BLOOD BY WESTERGREN METHOD: 50 MM/HR (ref 0–30)
FLUAV RNA RESP QL NAA+PROBE: NOT DETECTED
FLUBV RNA RESP QL NAA+PROBE: NOT DETECTED
GFR SERPLBLD CREATININE-BSD FMLA CKD-EPI: 78 ML/MIN/{1.73_M2}
GLUCOSE SERPL-MCNC: 96 MG/DL (ref 70–99)
GLUCOSE UR STRIP.AUTO-MCNC: NEGATIVE MG/DL
HCG SERPL QL: NEGATIVE
HCT VFR BLD AUTO: 38.4 % (ref 36–48)
HGB BLD-MCNC: 13.2 G/DL (ref 12–16)
HGB UR QL STRIP.AUTO: NEGATIVE
INR PPP: 0.9 (ref 0.85–1.15)
KETONES UR STRIP.AUTO-MCNC: NEGATIVE MG/DL
LEUKOCYTE ESTERASE UR QL STRIP.AUTO: NEGATIVE
LIPASE SERPL-CCNC: 35 U/L (ref 13–60)
LYMPHOCYTES # BLD: 4.3 K/UL (ref 1–5.1)
LYMPHOCYTES NFR BLD: 31.9 %
MAGNESIUM SERPL-MCNC: 2.02 MG/DL (ref 1.8–2.4)
MCH RBC QN AUTO: 31 PG (ref 26–34)
MCHC RBC AUTO-ENTMCNC: 34.3 G/DL (ref 31–36)
MCV RBC AUTO: 90.2 FL (ref 80–100)
MONOCYTES # BLD: 1 K/UL (ref 0–1.3)
MONOCYTES NFR BLD: 7.3 %
NEUTROPHILS # BLD: 7.8 K/UL (ref 1.7–7.7)
NEUTROPHILS NFR BLD: 57.5 %
NITRITE UR QL STRIP.AUTO: NEGATIVE
NT-PROBNP SERPL-MCNC: 432 PG/ML (ref 0–124)
PH UR STRIP.AUTO: 6.5 [PH] (ref 5–8)
PLATELET # BLD AUTO: 374 K/UL (ref 135–450)
PLATELET BLD QL SMEAR: ADEQUATE
PMV BLD AUTO: 7.3 FL (ref 5–10.5)
POTASSIUM SERPL-SCNC: 3.3 MMOL/L (ref 3.5–5.1)
PROCALCITONIN SERPL IA-MCNC: 0.04 NG/ML (ref 0–0.15)
PROT SERPL-MCNC: 7.9 G/DL (ref 6.4–8.2)
PROT UR STRIP.AUTO-MCNC: NEGATIVE MG/DL
PROTHROMBIN TIME: 12.4 SEC (ref 11.9–14.9)
RBC # BLD AUTO: 4.26 M/UL (ref 4–5.2)
SARS-COV-2 RNA RESP QL NAA+PROBE: NOT DETECTED
SLIDE REVIEW: ABNORMAL
SODIUM SERPL-SCNC: 142 MMOL/L (ref 136–145)
SP GR UR STRIP.AUTO: <=1.005 (ref 1–1.03)
TROPONIN, HIGH SENSITIVITY: <6 NG/L (ref 0–14)
TROPONIN, HIGH SENSITIVITY: <6 NG/L (ref 0–14)
UA COMPLETE W REFLEX CULTURE PNL UR: NORMAL
UA DIPSTICK W REFLEX MICRO PNL UR: NORMAL
URN SPEC COLLECT METH UR: NORMAL
UROBILINOGEN UR STRIP-ACNC: 0.2 E.U./DL
WBC # BLD AUTO: 13.6 K/UL (ref 4–11)

## 2024-11-18 PROCEDURE — 6360000004 HC RX CONTRAST MEDICATION: Performed by: PHYSICIAN ASSISTANT

## 2024-11-18 PROCEDURE — 96375 TX/PRO/DX INJ NEW DRUG ADDON: CPT

## 2024-11-18 PROCEDURE — 71260 CT THORAX DX C+: CPT

## 2024-11-18 PROCEDURE — 71045 X-RAY EXAM CHEST 1 VIEW: CPT

## 2024-11-18 PROCEDURE — 85025 COMPLETE CBC W/AUTO DIFF WBC: CPT

## 2024-11-18 PROCEDURE — 83690 ASSAY OF LIPASE: CPT

## 2024-11-18 PROCEDURE — 84145 PROCALCITONIN (PCT): CPT

## 2024-11-18 PROCEDURE — 83735 ASSAY OF MAGNESIUM: CPT

## 2024-11-18 PROCEDURE — 99285 EMERGENCY DEPT VISIT HI MDM: CPT

## 2024-11-18 PROCEDURE — 83880 ASSAY OF NATRIURETIC PEPTIDE: CPT

## 2024-11-18 PROCEDURE — 80053 COMPREHEN METABOLIC PANEL: CPT

## 2024-11-18 PROCEDURE — 85652 RBC SED RATE AUTOMATED: CPT

## 2024-11-18 PROCEDURE — 87636 SARSCOV2 & INF A&B AMP PRB: CPT

## 2024-11-18 PROCEDURE — 84484 ASSAY OF TROPONIN QUANT: CPT

## 2024-11-18 PROCEDURE — 85610 PROTHROMBIN TIME: CPT

## 2024-11-18 PROCEDURE — 6370000000 HC RX 637 (ALT 250 FOR IP): Performed by: EMERGENCY MEDICINE

## 2024-11-18 PROCEDURE — 93005 ELECTROCARDIOGRAM TRACING: CPT | Performed by: EMERGENCY MEDICINE

## 2024-11-18 PROCEDURE — 74177 CT ABD & PELVIS W/CONTRAST: CPT

## 2024-11-18 PROCEDURE — 81003 URINALYSIS AUTO W/O SCOPE: CPT

## 2024-11-18 PROCEDURE — 6360000002 HC RX W HCPCS: Performed by: PHYSICIAN ASSISTANT

## 2024-11-18 PROCEDURE — 84703 CHORIONIC GONADOTROPIN ASSAY: CPT

## 2024-11-18 PROCEDURE — 86140 C-REACTIVE PROTEIN: CPT

## 2024-11-18 PROCEDURE — 96374 THER/PROPH/DIAG INJ IV PUSH: CPT

## 2024-11-18 PROCEDURE — 85730 THROMBOPLASTIN TIME PARTIAL: CPT

## 2024-11-18 RX ORDER — POTASSIUM CHLORIDE 1500 MG/1
20 TABLET, EXTENDED RELEASE ORAL ONCE
Status: COMPLETED | OUTPATIENT
Start: 2024-11-18 | End: 2024-11-18

## 2024-11-18 RX ORDER — NITROGLYCERIN 20 MG/100ML
5-200 INJECTION INTRAVENOUS CONTINUOUS
Status: DISCONTINUED | OUTPATIENT
Start: 2024-11-18 | End: 2024-11-18

## 2024-11-18 RX ORDER — ONDANSETRON 2 MG/ML
4 INJECTION INTRAMUSCULAR; INTRAVENOUS ONCE
Status: COMPLETED | OUTPATIENT
Start: 2024-11-18 | End: 2024-11-18

## 2024-11-18 RX ORDER — IOPAMIDOL 755 MG/ML
75 INJECTION, SOLUTION INTRAVASCULAR
Status: COMPLETED | OUTPATIENT
Start: 2024-11-18 | End: 2024-11-18

## 2024-11-18 RX ORDER — COLCHICINE 0.6 MG/1
0.6 TABLET ORAL ONCE
Status: COMPLETED | OUTPATIENT
Start: 2024-11-18 | End: 2024-11-18

## 2024-11-18 RX ORDER — PREDNISONE 20 MG/1
20 TABLET ORAL ONCE
Status: DISCONTINUED | OUTPATIENT
Start: 2024-11-18 | End: 2024-11-18

## 2024-11-18 RX ORDER — NAPROXEN 250 MG/1
250 TABLET ORAL ONCE
Status: DISCONTINUED | OUTPATIENT
Start: 2024-11-18 | End: 2024-11-18

## 2024-11-18 RX ORDER — COLCHICINE 0.6 MG/1
0.6 TABLET ORAL 2 TIMES DAILY
Qty: 28 TABLET | Refills: 0 | Status: SHIPPED | OUTPATIENT
Start: 2024-11-18 | End: 2024-11-18 | Stop reason: ALTCHOICE

## 2024-11-18 RX ORDER — MORPHINE SULFATE 4 MG/ML
4 INJECTION, SOLUTION INTRAMUSCULAR; INTRAVENOUS
Status: COMPLETED | OUTPATIENT
Start: 2024-11-18 | End: 2024-11-18

## 2024-11-18 RX ORDER — DESVENLAFAXINE 100 MG/1
100 TABLET, EXTENDED RELEASE ORAL DAILY
COMMUNITY

## 2024-11-18 RX ORDER — PREDNISONE 20 MG/1
20 TABLET ORAL DAILY
Qty: 14 TABLET | Refills: 0 | Status: SHIPPED | OUTPATIENT
Start: 2024-11-18 | End: 2024-11-18 | Stop reason: SINTOL

## 2024-11-18 RX ADMIN — MORPHINE SULFATE 4 MG: 4 INJECTION, SOLUTION INTRAMUSCULAR; INTRAVENOUS at 14:36

## 2024-11-18 RX ADMIN — ONDANSETRON 4 MG: 2 INJECTION INTRAMUSCULAR; INTRAVENOUS at 14:21

## 2024-11-18 RX ADMIN — IOPAMIDOL 75 ML: 755 INJECTION, SOLUTION INTRAVENOUS at 15:09

## 2024-11-18 RX ADMIN — COLCHICINE 0.6 MG: 0.6 TABLET, FILM COATED ORAL at 17:39

## 2024-11-18 RX ADMIN — POTASSIUM CHLORIDE 20 MEQ: 1500 TABLET, EXTENDED RELEASE ORAL at 16:57

## 2024-11-18 SDOH — ECONOMIC STABILITY: FOOD INSECURITY: WITHIN THE PAST 12 MONTHS, YOU WORRIED THAT YOUR FOOD WOULD RUN OUT BEFORE YOU GOT MONEY TO BUY MORE.: NEVER TRUE

## 2024-11-18 SDOH — ECONOMIC STABILITY: INCOME INSECURITY: IN THE LAST 12 MONTHS, WAS THERE A TIME WHEN YOU WERE NOT ABLE TO PAY THE MORTGAGE OR RENT ON TIME?: NO

## 2024-11-18 SDOH — ECONOMIC STABILITY: FOOD INSECURITY: WITHIN THE PAST 12 MONTHS, THE FOOD YOU BOUGHT JUST DIDN'T LAST AND YOU DIDN'T HAVE MONEY TO GET MORE.: NEVER TRUE

## 2024-11-18 SDOH — ECONOMIC STABILITY: INCOME INSECURITY: HOW HARD IS IT FOR YOU TO PAY FOR THE VERY BASICS LIKE FOOD, HOUSING, MEDICAL CARE, AND HEATING?: NOT HARD AT ALL

## 2024-11-18 ASSESSMENT — PAIN SCALES - GENERAL
PAINLEVEL_OUTOF10: 6
PAINLEVEL_OUTOF10: 7

## 2024-11-18 ASSESSMENT — ENCOUNTER SYMPTOMS
VOMITING: 0
EYE REDNESS: 0
DIARRHEA: 1
RHINORRHEA: 0
COUGH: 0
BACK PAIN: 0
SHORTNESS OF BREATH: 1
NAUSEA: 0
SORE THROAT: 0
ABDOMINAL PAIN: 1
STRIDOR: 0
EYE ITCHING: 0
EYE DISCHARGE: 0

## 2024-11-18 ASSESSMENT — HEART SCORE: ECG: NORMAL

## 2024-11-18 ASSESSMENT — PAIN DESCRIPTION - LOCATION
LOCATION: CHEST
LOCATION: CHEST

## 2024-11-18 ASSESSMENT — PAIN - FUNCTIONAL ASSESSMENT: PAIN_FUNCTIONAL_ASSESSMENT: 0-10

## 2024-11-18 NOTE — TELEPHONE ENCOUNTER
CALLED AND SPOKE TO PATIENT. SHE IS HAVING CHEST PAINS AND SOB OFF AND ON AND ALSO INCONTINENCE BOTH BOWEL AND URINE X 3 WEEKS. PER POLLO PATIENT NEEDS TO GO TO THE ER. PT VOICED UNDERSTANDING. SC     Yes

## 2024-11-18 NOTE — PROGRESS NOTES
Pharmacy Home Medication Reconciliation Note    A medication reconciliation has been completed for Brittany Mccall 1975    Pharmacy: [address]  Information provided by: patient    The patient's home medication list is as follows:  No current facility-administered medications on file prior to encounter.     Current Outpatient Medications on File Prior to Encounter   Medication Sig Dispense Refill    desvenlafaxine succinate (PRISTIQ) 100 MG TB24 extended release tablet Take 1 tablet by mouth daily      fluticasone (FLONASE) 50 MCG/ACT nasal spray Use 2 spray(s) in each nostril once daily (Patient taking differently: 2 sprays by Nasal route daily) 16 g 0    carvedilol (COREG) 12.5 MG tablet Take 1 tablet by mouth 2 times daily 180 tablet 3    apixaban (ELIQUIS) 5 MG TABS tablet Take 1 tablet by mouth 2 times daily 180 tablet 1    aspirin 81 MG EC tablet Take 1 tablet by mouth daily 90 tablet 3    cetirizine (EQ ALLERGY RELIEF, CETIRIZINE,) 10 MG tablet Take 1 tablet by mouth once daily (Patient taking differently: Take 1 tablet by mouth daily Take 1 tablet by mouth once daily) 90 tablet 2    esomeprazole (NEXIUM) 40 MG delayed release capsule TAKE 1 CAPSULE BY MOUTH ONCE DAILY IN THE MORNING BEFORE BREAKFAST (Patient taking differently: 1 capsule every morning (before breakfast) TAKE 1 CAPSULE BY MOUTH ONCE DAILY IN THE MORNING BEFORE BREAKFAST) 90 capsule 2    valsartan (DIOVAN) 320 MG tablet Take 1 tablet by mouth daily 90 tablet 2    rosuvastatin (CRESTOR) 20 MG tablet Take 1 tablet by mouth daily 90 tablet 1    clonazePAM (KLONOPIN) 0.5 MG tablet Take 1 tablet by mouth 2 times daily as needed for Anxiety for up to 7 days. Max Daily Amount: 1 mg 14 tablet 0    acetaminophen (TYLENOL) 325 MG tablet Take 2 tablets by mouth every 6 hours as needed for Pain 120 tablet 2    brexpiprazole (REXULTI) 0.5 MG TABS tablet Take 1 tablet by mouth in the morning and at bedtime

## 2024-11-18 NOTE — DISCHARGE INSTRUCTIONS
Your prescription has been sent to [facility].    Be sure to contact your cardiologist's office to arrange for a follow up visit.    If you have any new or worsening issues after going home don't hesitate to return here for reevaluation at any time 24/7!

## 2024-11-18 NOTE — ED PROVIDER NOTES
[facility] EMERGENCY DEPARTMENT  EMERGENCY DEPARTMENT ENCOUNTER        Pt Name: Brittany Mccall  MRN: [id number]  Birthdate 1975  Date of evaluation: 11/18/2024  Provider: TRINA Farfan  PCP: Kamran Saleh APRN - CNP  Note Started: 4:43 PM EST 11/18/24       I have seen and evaluated this patient with my supervising physician Jasen Tinsley MD.      CHIEF COMPLAINT       Chief Complaint   Patient presents with    Chest Pain     Patient states intermittent chest pain under L breast that started 3 days ago, states was told to come to ER by PCP, endorses cardiac hx. Patient has multiple complaints.        HISTORY OF PRESENT ILLNESS: 1 or more Elements     History From: patient  Limitations to history : None    Brittany Mccall is a 49 y.o. female who presents to the emergency department with 3-day history of intermittent chest pain under her left breast.  She has history of PFO status post closure.  No history of ACS or cardiac stenting.  No clear aggravating or alleviating factors.  Patient is also reporting some shortness of breath, diarrhea, general fatigue and increased urinary urgency.  She denies fever or back pain.  No known sick contacts.    Nursing Notes were all reviewed and agreed with or any disagreements were addressed in the HPI.    REVIEW OF SYSTEMS :      Review of Systems   Constitutional:  Positive for fatigue. Negative for chills, diaphoresis and fever.   HENT:  Negative for congestion, rhinorrhea and sore throat.    Eyes:  Negative for discharge, redness and itching.   Respiratory:  Positive for shortness of breath. Negative for cough and stridor.    Cardiovascular:  Positive for chest pain. Negative for palpitations.   Gastrointestinal:  Positive for abdominal pain and diarrhea. Negative for nausea and vomiting.   Genitourinary:  Positive for urgency. Negative for dysuria and hematuria.   Musculoskeletal:  Negative for back pain and joint swelling.   Skin:  Negative

## 2024-11-18 NOTE — TELEPHONE ENCOUNTER
Pt went to the hospital about a week and a  half ago.  It was for incontinence both ways x 3 weeks now, chest palpitations.  Throwing up and no solid stools for the three weeks.  Constant Diarrhea.    Pt wants to speak to Anu HERNANDEZ about this.

## 2024-11-19 ENCOUNTER — TELEPHONE (OUTPATIENT)
Dept: CARDIOLOGY CLINIC | Age: 49
End: 2024-11-19

## 2024-11-19 LAB
EKG ATRIAL RATE: 78 BPM
EKG DIAGNOSIS: NORMAL
EKG P AXIS: 67 DEGREES
EKG P-R INTERVAL: 148 MS
EKG Q-T INTERVAL: 416 MS
EKG QRS DURATION: 86 MS
EKG QTC CALCULATION (BAZETT): 474 MS
EKG R AXIS: 59 DEGREES
EKG T AXIS: 44 DEGREES
EKG VENTRICULAR RATE: 78 BPM

## 2024-11-19 PROCEDURE — 93010 ELECTROCARDIOGRAM REPORT: CPT | Performed by: INTERNAL MEDICINE

## 2024-11-19 RX ORDER — COLCHICINE 0.6 MG/1
0.3 TABLET ORAL DAILY
Qty: 30 TABLET | Refills: 3 | Status: SHIPPED | OUTPATIENT
Start: 2024-11-19 | End: 2024-11-19

## 2024-11-19 RX ORDER — COLCHICINE 0.6 MG/1
0.3 TABLET ORAL DAILY
Qty: 7 TABLET | Refills: 0
Start: 2024-11-19 | End: 2024-11-20 | Stop reason: ALTCHOICE

## 2024-11-19 NOTE — TELEPHONE ENCOUNTER
Discussed with RAYMOND - decrease Colchicine to 0.3 mg daily and to take the Coreg.      Prescription updated.     I called and updated Brittany.  She states that she has not even picked up the Colchicine prescription yet because it is going to cost her $277 out of pocket.  I spoke to [facility] to advise that a new prescription was sent and that RAYMOND has advised for her to continue taking the Carvedilol. There is not going to be an out of pocket cost for the new prescription.  Brittany advised.  She verbalized understanding and denies any other questions and/or concerns and is aware of her appointment tomorrow morning.

## 2024-11-19 NOTE — TELEPHONE ENCOUNTER
[facility] called stating that there is a drug interaction with the carvedilol and the RX prescribed by the ER the colchiceine. The colchiceine does not get metabolized by the carvedilol it is recommended that the pt hold the carvedilol for 2 weeks.    Pls advise the pharmacy

## 2024-11-19 NOTE — ED PROVIDER NOTES
Did not see this patient face-to-face.  Received a call from pharmacy just now stating concern for Coreg in conjunction with colchicine.  In review of the chart they were treating for pericarditis.  I did advise holding Coreg for 2 weeks while she is on colchicine and then can resume her Coreg when this is completed.     Steven Vargas MD  11/19/24 5291

## 2024-11-19 NOTE — TELEPHONE ENCOUNTER
There is a note in her chart from earlier today by Dr. Vargas with notation that he advised to hold Coreg for 2 weeks while she is on Colchicine and then resume Coreg when it is completed.      I called and spoke to Brittany.  She has not taken any Coreg for 2 days and is concerned about her BP being elevated.  She was able to check it while on the phone with me and the first time it was 180/107 and a few minutes later 151/78, pulse 71.  She is wanting to know what she should do since her BP is so high and having to hold her Coreg for 2 weeks.  She does have an appointment scheduled with RAYMOND tomorrow morning.  I advised that I would discuss with RAYMOND and call her back with an update.

## 2024-11-20 ENCOUNTER — OFFICE VISIT (OUTPATIENT)
Dept: CARDIOLOGY CLINIC | Age: 49
End: 2024-11-20

## 2024-11-20 VITALS
SYSTOLIC BLOOD PRESSURE: 128 MMHG | HEIGHT: 69 IN | WEIGHT: 217 LBS | BODY MASS INDEX: 32.14 KG/M2 | DIASTOLIC BLOOD PRESSURE: 84 MMHG | OXYGEN SATURATION: 97 % | HEART RATE: 70 BPM

## 2024-11-20 DIAGNOSIS — I49.3 PVC (PREMATURE VENTRICULAR CONTRACTION): ICD-10-CM

## 2024-11-20 DIAGNOSIS — Q21.10 ATRIAL SEPTAL DEFECT: ICD-10-CM

## 2024-11-20 DIAGNOSIS — Z72.0 TOBACCO ABUSE: ICD-10-CM

## 2024-11-20 DIAGNOSIS — I47.29 RVOT VENTRICULAR TACHYCARDIA (HCC): ICD-10-CM

## 2024-11-20 DIAGNOSIS — Z87.74 S/P DEVICE CLOSURE OF ATRIAL SEPTAL DEFECT: ICD-10-CM

## 2024-11-20 DIAGNOSIS — R07.9 CHEST PAIN, UNSPECIFIED TYPE: Primary | ICD-10-CM

## 2024-11-20 DIAGNOSIS — I10 ESSENTIAL HYPERTENSION: ICD-10-CM

## 2024-11-20 DIAGNOSIS — Z86.73 H/O ISCHEMIC LEFT PCA STROKE: ICD-10-CM

## 2024-11-20 DIAGNOSIS — I63.9 CRYPTOGENIC STROKE (HCC): ICD-10-CM

## 2024-11-20 DIAGNOSIS — I31.39 PERICARDIAL EFFUSION: ICD-10-CM

## 2024-11-20 DIAGNOSIS — E78.00 PURE HYPERCHOLESTEROLEMIA: ICD-10-CM

## 2024-11-20 DIAGNOSIS — F17.200 SMOKING: ICD-10-CM

## 2024-11-20 DIAGNOSIS — F41.1 GENERALIZED ANXIETY DISORDER: ICD-10-CM

## 2024-11-20 RX ORDER — NITROGLYCERIN 0.4 MG/1
0.8 TABLET SUBLINGUAL
Status: CANCELLED | OUTPATIENT
Start: 2024-11-20 | End: 2024-11-21

## 2024-11-20 RX ORDER — METOPROLOL TARTRATE 50 MG
TABLET ORAL
Qty: 3 TABLET | Refills: 0 | Status: SHIPPED | OUTPATIENT
Start: 2024-11-20

## 2024-11-20 RX ORDER — SODIUM CHLORIDE 0.9 % (FLUSH) 0.9 %
5-40 SYRINGE (ML) INJECTION PRN
Status: CANCELLED | OUTPATIENT
Start: 2024-11-20

## 2024-11-20 RX ORDER — SODIUM CHLORIDE 0.9 % (FLUSH) 0.9 %
5-40 SYRINGE (ML) INJECTION EVERY 12 HOURS SCHEDULED
Status: CANCELLED | OUTPATIENT
Start: 2024-11-20

## 2024-11-20 RX ORDER — NITROGLYCERIN 0.4 MG/1
0.4 TABLET SUBLINGUAL
Status: CANCELLED | OUTPATIENT
Start: 2024-11-20 | End: 2024-11-21

## 2024-11-20 RX ORDER — SODIUM CHLORIDE 9 MG/ML
INJECTION, SOLUTION INTRAVENOUS PRN
Status: CANCELLED | OUTPATIENT
Start: 2024-11-20

## 2024-11-20 RX ORDER — METOPROLOL TARTRATE 1 MG/ML
5 INJECTION, SOLUTION INTRAVENOUS EVERY 5 MIN PRN
Status: CANCELLED | OUTPATIENT
Start: 2024-11-20

## 2024-11-20 RX ORDER — CLONAZEPAM 0.5 MG/1
0.5 TABLET ORAL 2 TIMES DAILY PRN
Qty: 14 TABLET | Refills: 0 | Status: SHIPPED | OUTPATIENT
Start: 2024-11-20 | End: 2024-11-27

## 2024-11-20 NOTE — ED PROVIDER NOTES
EMERGENCY DEPARTMENT SUPERVISING PHYSICIAN NOTE    I have seen this patient & have reviewed history and findings with the PA, NP, or resident physician and provided direct supervision. I saw the patient and performed a substantive portion of the visit. I was present for key portions of any procedures performed. I've participated in medical management, monitoring, and treatment of this patient with the provider. I have reviewed currently available documentation, test results, and laboratory results in the interim. Care plan has been developed collaboratively. I take responsibility for the patient's management from when I was asked to get involved in this patient's care. Diandra and JESS are the primary clinicians of record.    Brief HPI:  49F tells me that she fairly recently had a cold-like or flu-like illness; this seems to have subsided over time  Also had COVID several weeks ago  Recently she has had intermittent, sometimes vague, sometimes sharp pain to her lower left chest    Pertinent Exam Findings:  /69   Pulse 75   Temp 98.2 °F (36.8 °C) (Oral)   Resp 15   SpO2 100%  Awake, alert, not acutely ill-appearing, not distressed  CTAB, RRR, 2+ radial pulses  RR and WOB normal   Abdomen soft, NT, ND  No TTP to lower legs on either side    CT ABDOMEN PELVIS W IV CONTRAST Additional Contrast? None   Final Result   No acute findings in the abdomen or pelvis.         CT CHEST PULMONARY EMBOLISM W CONTRAST   Final Result   1. No pulmonary embolism.   2. Trace pericardial effusion.  No additional acute findings are noted   elsewhere in the chest.         XR CHEST PORTABLE   Final Result   No radiographic evidence of acute cardiopulmonary pathology.           Plan:  She does not appear acutely ill clinically  Exam findings are non-focal and reassuring  EKG does not show highly suspicious acute ischemic changes compared to her most recent prior EKG  Anterior T wave inversions were also present on EKG

## 2024-11-20 NOTE — PROGRESS NOTES
[facility]      CONSULTATION  [phone]  11/20/24  Referring: \ Kamran Saleh APRN - CNP (PCP)    REASON FOR CONSULT/CHIEF COMPLAINT/HPI     Reason for visit/ Chief complaint  Chest pain, ER visit follow-up       HPI Brittany Mccall is a 49 y.o. patient here for hospital follow up follow chest pain x 5 days.     On 11/18/24 she had a CT in ED that showed trace pericardial effusion with mildly elevated inflammatory markers. She was empirically treated with Colchicine for pericarditis.     She has a history of HTN hypokalemia, Factor V Leiden, hypokalemia, DVT during pregnancy, and a recent ischemc stroke (11/2023 in [place]).    While in the hospital, she had a very long but technically nonsustained (~20 sec) run of VT at 300 bpm.  This was determined to be RVOT pathology and has since been successfully ablatied.    After review of her symptoms, I was initially concerned about ischemia given her VT and smoking history, and I attempted to get a coronary CTA.  We were unable to accomplish this due to elevated heart rate.  As an outpatient, she had a GXT that was nondiagnostic.  I then ordered an imaging stress test, which was denied by insurance.    Upon further review with Dr. 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  Her main risk factor for CAD is smoking.    She established care with  neurology, who did a thromobophilia workup.  Her only significant abnormality was being heterozygous for Factor V Leiden, so they stopped DAPT and changed her to Eliquis.    S/p Successful ablation of PVC from posterior RVOT and anterior LVOT 5/14/24     Underwent PFO closure 07/03/24 7/8/24 Presented to [facility] ED with complaints of vision loss with floaters and right sided headaches. MRI and CT head unremarkable. NOEMI noted PFO device with slight leak (but likely nonsignificant as the device hadn't had enough time to fully

## 2024-11-20 NOTE — PATIENT INSTRUCTIONS
Follow up with Dr Ewing in January     CTA test soon - Take your metoprolol 50mg 2 hrs prior to your test.   Echo soon     Stop Colchicine     Call for any questions or concerns.

## 2024-11-20 NOTE — TELEPHONE ENCOUNTER
Patient was calling to get a refill     Clonazepam 0.5 MG, 2x's daily, 30 days with refills     [facility] pharmacy     Phone number

## 2024-11-21 ENCOUNTER — HOSPITAL ENCOUNTER (OUTPATIENT)
Dept: CT IMAGING | Age: 49
Discharge: HOME | End: 2024-11-21
Payer: MEDICARE

## 2024-11-21 ENCOUNTER — TELEPHONE (OUTPATIENT)
Dept: CARDIOLOGY CLINIC | Age: 49
End: 2024-11-21

## 2024-11-21 VITALS
RESPIRATION RATE: 20 BRPM | OXYGEN SATURATION: 100 % | DIASTOLIC BLOOD PRESSURE: 81 MMHG | SYSTOLIC BLOOD PRESSURE: 145 MMHG | HEART RATE: 62 BPM

## 2024-11-21 DIAGNOSIS — R06.02 SHORTNESS OF BREATH: ICD-10-CM

## 2024-11-21 DIAGNOSIS — R93.1 ABNORMAL COMPUTED TOMOGRAPHY ANGIOGRAPHY OF HEART: Primary | ICD-10-CM

## 2024-11-21 DIAGNOSIS — I31.39 PERICARDIAL EFFUSION: ICD-10-CM

## 2024-11-21 DIAGNOSIS — R07.9 CHEST PAIN, UNSPECIFIED TYPE: ICD-10-CM

## 2024-11-21 DIAGNOSIS — I20.0 UNSTABLE ANGINA (HCC): ICD-10-CM

## 2024-11-21 PROCEDURE — 6360000004 HC RX CONTRAST MEDICATION: Performed by: INTERNAL MEDICINE

## 2024-11-21 PROCEDURE — 75574 CT ANGIO HRT W/3D IMAGE: CPT

## 2024-11-21 PROCEDURE — 6370000000 HC RX 637 (ALT 250 FOR IP): Performed by: INTERNAL MEDICINE

## 2024-11-21 RX ORDER — NITROGLYCERIN 0.4 MG/1
0.4 TABLET SUBLINGUAL
Status: COMPLETED | OUTPATIENT
Start: 2024-11-21 | End: 2024-11-21

## 2024-11-21 RX ORDER — METOPROLOL TARTRATE 1 MG/ML
5 INJECTION, SOLUTION INTRAVENOUS EVERY 5 MIN PRN
Status: DISCONTINUED | OUTPATIENT
Start: 2024-11-21 | End: 2024-11-22 | Stop reason: HOSPADM

## 2024-11-21 RX ORDER — SODIUM CHLORIDE 0.9 % (FLUSH) 0.9 %
5-40 SYRINGE (ML) INJECTION PRN
Status: DISCONTINUED | OUTPATIENT
Start: 2024-11-21 | End: 2024-11-22 | Stop reason: HOSPADM

## 2024-11-21 RX ORDER — SODIUM CHLORIDE 0.9 % (FLUSH) 0.9 %
5-40 SYRINGE (ML) INJECTION EVERY 12 HOURS SCHEDULED
Status: DISCONTINUED | OUTPATIENT
Start: 2024-11-21 | End: 2024-11-22 | Stop reason: HOSPADM

## 2024-11-21 RX ORDER — NITROGLYCERIN 0.4 MG/1
0.8 TABLET SUBLINGUAL
Status: COMPLETED | OUTPATIENT
Start: 2024-11-21 | End: 2024-11-21

## 2024-11-21 RX ORDER — SODIUM CHLORIDE 9 MG/ML
INJECTION, SOLUTION INTRAVENOUS PRN
Status: DISCONTINUED | OUTPATIENT
Start: 2024-11-21 | End: 2024-11-22 | Stop reason: HOSPADM

## 2024-11-21 RX ORDER — IOPAMIDOL 755 MG/ML
75 INJECTION, SOLUTION INTRAVASCULAR
Status: COMPLETED | OUTPATIENT
Start: 2024-11-21 | End: 2024-11-21

## 2024-11-21 RX ADMIN — IOPAMIDOL 75 ML: 755 INJECTION, SOLUTION INTRAVENOUS at 13:42

## 2024-11-21 RX ADMIN — NITROGLYCERIN 0.8 MG: 0.4 TABLET SUBLINGUAL at 13:56

## 2024-11-21 NOTE — DISCHARGE INSTRUCTIONS
INTERVENTIONAL RADIOLOGY DEPARTMENT  [facility]  [address], [place] [address]  Telephone: [phone]      PATIENT NAME: Brittany Mccall  MEDICAL RECORD NUMBER:  [id number]  TODAY'S DATE: @ED@      POST CORONARY  CTA  INSTRUCTIONS:    Today you had an imaging study with contrast injected to visualize your coronary/heart arteries.    Any questions about your procedure can be directed to your doctor or the radiology department at #[phone].  Below are your discharge instructions.        1.  Drink 4-6 glasses of fluid the rest of today to help flush the contrast from your body.  2.  You should contact Quintin Christian MD for the results of your study.  3.  If you take an oral diabetic medication, you may resume it 11-24-24 .  4.  If you received a medication called Metoprolol today, you may experience:                  * anxiety, sweating, dizziness or a slowed heart rate.                   * If these persist after tomorrow or the severity increases, contact Quintin Christian MD.                   * If it is after hours you may go to the nearest Emergency Room.  5.  If you take Viagra, or a medication similar to this - you must stay OFF this medication for 96 hours.

## 2024-11-21 NOTE — TELEPHONE ENCOUNTER
Discussed with RAYMOND - Set up for [facility] with Dr. Cortes.      Case request placed.      Left Heart Cath Patient Pre-procedure Instructions    Do NOT eat or drink anything after midnight morning of procedure    MEDICATIONS:  Your medications have been reviewed. Please follow medication instructions below  It is okay to drink a sip of water with meds morning of procedure  Eliquis-do not take for 48 hours (2 days) prior to procedure    Transportation: Bring someone to drive you home.     Scheduling: Raquel WATT is our full time procedure . She will call you to schedule your procedure.    Allergies: Do you have an allergy to dye or contrast? No.      I called and spoke to Brittany and advised of the above.  [facility] instructions reviewed with her and that Raquel will be reaching out to her to schedule.  She verbalized understanding and denied any further questions and/or concerns.

## 2024-11-25 PROBLEM — R06.02 SHORTNESS OF BREATH: Status: ACTIVE | Noted: 2024-11-21

## 2024-11-25 PROBLEM — R93.1 ABNORMAL COMPUTED TOMOGRAPHY ANGIOGRAPHY OF HEART: Status: ACTIVE | Noted: 2024-11-21

## 2024-11-25 PROBLEM — I20.0 UNSTABLE ANGINA (HCC): Status: ACTIVE | Noted: 2024-11-21

## 2024-11-25 NOTE — TELEPHONE ENCOUNTER
Date of Procedure: Monday 12/9/24 @ [facility] with Dr. Cortes     Time of arrival: 6:45 am     Procedure time: 8:00 am     Called and spoke to Brittany and she is agreeable to date and time. Reviewed and emailed Brittany her procedure instructions and she verbalized understanding. Encouraged to call with any questions or concerns.     Published on [facility], scheduled in [facility] and e-mailed to cath lab.

## 2024-11-29 ENCOUNTER — TELEPHONE (OUTPATIENT)
Dept: CARDIOLOGY CLINIC | Age: 49
End: 2024-11-29

## 2024-11-29 NOTE — TELEPHONE ENCOUNTER
I spoke with Brittany, she reports that hr BP is \"always high\", reviewed BP in chart, 140's/80-90's  She then reports that she has \"not been checking it at home\"  I did instruct her how to check her BP at home.   She verbalizes understanding.  She reports that she will continue to monitor . I instructed her if her BP remains high as reported this encounter, she should proceed to ED. She verbalizes understanding.  Patient currently denies any weight gain, edema, palpitations, chest pain, shortness of breath, dizziness, and syncope.

## 2024-11-29 NOTE — TELEPHONE ENCOUNTER
Patient complains of CP on/off happens more with lying down at night time.   SOB with exertion.    Currently denies CP, SOB or dizziness.     /115  HR 72     BP  183/108  HR  75    She has taken her AM medications and she states her BP does run on the high side.

## 2024-11-29 NOTE — TELEPHONE ENCOUNTER
----- Message from Dr. Quintin Ewing MD sent at 11/27/2024  4:51 PM EST -----  Echo looks good, no significant fluid around the heart.  She will still need the NOEMI in January to look at her ASD device.

## 2024-12-02 ENCOUNTER — HOSPITAL ENCOUNTER (OUTPATIENT)
Dept: CT IMAGING | Age: 49
Discharge: HOME | End: 2024-12-02
Attending: INTERNAL MEDICINE
Payer: MEDICARE

## 2024-12-02 DIAGNOSIS — R07.9 CARDIAC CHEST PAIN: ICD-10-CM

## 2024-12-02 DIAGNOSIS — I31.39 EFFUSION, PERICARDIUM: ICD-10-CM

## 2024-12-02 PROCEDURE — 75580 N-INVAS EST C FFR SW ALY CTA: CPT

## 2024-12-02 NOTE — PROGRESS NOTES
atelectasis in the dependent portion of the lungs.  Mild  gastroesophageal reflux    CTA HEAD NECK W CONTRAST 12/02/2023 10:52 AM (Final)  COMPARISON:  12/01/2023    Reason for exam:->acute posterior neck pain, rule out vertebral dissection    Impression  1. No vertebral artery dissection identified.  2. Short segment severe stenosis of the P2 segment of the left posterior  cerebral artery, unchanged.  3. Otherwise, unremarkable CT angiogram of the head and neck.    Signed by: Lux Silva MD on 12/2/2023 10:52 AM    11/28/23: MRI brain  CONCLUSION:   1. Acute infarct within the left PCA distribution with involvement of the occipital lobe.  Results called to Dr. Solis on the current date at 9:30 p.m. Eastern Standard Time.   2. No hemorrhage.        Outside/Care everywhere records Reviewed  Labs Reviewed  Prior Imaging, ekg, cath, echo reviewed when available  Medications reviewed  Old Notes reviewed  ASSESSMENT AND PLAN     Encounter Diagnoses   Name Primary?    CAD in native artery     JUAREZ (dyspnea on exertion)     Tobacco abuse     Chest pain, unspecified type Yes    PVC (premature ventricular contraction)     RVOT ventricular tachycardia (HCC)     H/O ischemic left PCA stroke     Pure hypercholesterolemia     Smoking     Essential hypertension     Cryptogenic stroke (HCC)     Atrial septal defect     S/P device closure of atrial septal defect     Pericardial effusion      Chest pain - mixture of typical and atypical features of angina.  Cath overall reassuring.  Given moderate CAD and other risk factors will continue to lower lipids as much as possible.  LDL goal < 55.  Increase rosuvastatin from 20 to 40 today.    PVC/VT - s/p ablation.  Continue beta blocker  JUAREZ - refer to pulmonary  HTN - increase carvedilol from 12.5 to 25 BID  Pericardial effusion - resolved  Tobacco abuse - I provided 4 minutes of face-to-face counseling about quitting smoking, including discussion of pharmacologic aids to

## 2024-12-04 PROBLEM — I31.39 PERICARDIAL EFFUSION: Status: ACTIVE | Noted: 2024-12-04

## 2024-12-04 NOTE — PROGRESS NOTES
[facility]  Cardiology Consult    Brittany Mccall  1975    December 5, 2024      Reason for Referral: PFO/ASD    Referring physician:    CC: \"I am having an angiogram.\"      Subjective:     History of Present Illness:    Brittany Mccall is a 49 y.o. patient with a PMH significant for hypertension, DVT, PVC's and ischemic stroke. She had a stroke in [place] 11/2023, she was frustrated with her care she received in [place] and left AMA and family drover her to [facility] 12/1/2023 to be admitted. Her main problem with her stroke was vision in her right eye. She is s/p PFO closure 7/3/2024.     Today, she is here for follow up s/p PFO. She has been having chest pain and is scheduled for a [facility] next week. She had her echocardiogram today. Patient denies dyspnea at rest, JUAREZ, PND, orthopnea, palpitations, lightheadedness, weight changes, changes in LE edema, and syncope.     Past Medical History:   has a past medical history of Anxiety, Cerebral artery occlusion with cerebral infarction (HCC), DVT (deep vein thrombosis) in pregnancy, Hypertension, and Low blood potassium.    Surgical History:   has a past surgical history that includes Dilation and curettage of uterus; Ovary removal; Tubal ligation; Hysterectomy (5/28/15); Abdominal exploration surgery (8/12/15); ep device procedure (N/A, 5/14/2024); and Cardiac procedure (N/A, 7/3/2024).     Social History:   reports that she has been smoking cigarettes. She started smoking about 10 years ago. She has a 10.9 pack-year smoking history. She has been exposed to tobacco smoke. She has never used smokeless tobacco. She reports current alcohol use. She reports that she does not use drugs.     Family History:  family history includes Diabetes in her father; Heart Disease in her father; High Blood Pressure in her father; Hypertension in her sister.    Home Medications:  Were reviewed and are listed in nursing record and/or below  Prior to Admission

## 2024-12-05 ENCOUNTER — OFFICE VISIT (OUTPATIENT)
Dept: CARDIOLOGY CLINIC | Age: 49
End: 2024-12-05
Payer: MEDICARE

## 2024-12-05 ENCOUNTER — HOSPITAL ENCOUNTER (OUTPATIENT)
Age: 49
Discharge: HOME | End: 2024-12-07
Attending: INTERNAL MEDICINE
Payer: MEDICARE

## 2024-12-05 VITALS
HEIGHT: 69 IN | DIASTOLIC BLOOD PRESSURE: 96 MMHG | HEART RATE: 67 BPM | WEIGHT: 220 LBS | RESPIRATION RATE: 15 BRPM | SYSTOLIC BLOOD PRESSURE: 150 MMHG | BODY MASS INDEX: 32.58 KG/M2 | OXYGEN SATURATION: 99 %

## 2024-12-05 VITALS
WEIGHT: 217 LBS | BODY MASS INDEX: 32.14 KG/M2 | HEIGHT: 69 IN | SYSTOLIC BLOOD PRESSURE: 140 MMHG | DIASTOLIC BLOOD PRESSURE: 80 MMHG

## 2024-12-05 DIAGNOSIS — Q21.12 PATENT FORAMEN OVALE: ICD-10-CM

## 2024-12-05 DIAGNOSIS — Q21.12 PFO (PATENT FORAMEN OVALE): Primary | ICD-10-CM

## 2024-12-05 DIAGNOSIS — Z87.74 S/P PATENT FORAMEN OVALE CLOSURE: ICD-10-CM

## 2024-12-05 LAB
ECHO AO ROOT DIAM: 3.3 CM
ECHO AO ROOT INDEX: 1.54 CM/M2
ECHO AR MAX VEL PISA: 5 M/S
ECHO AV AREA PEAK VELOCITY: 1.6 CM2
ECHO AV AREA VTI: 2 CM2
ECHO AV AREA/BSA PEAK VELOCITY: 0.7 CM2/M2
ECHO AV AREA/BSA VTI: 0.9 CM2/M2
ECHO AV MEAN GRADIENT: 5 MMHG
ECHO AV MEAN VELOCITY: 1.1 M/S
ECHO AV PEAK GRADIENT: 12 MMHG
ECHO AV PEAK VELOCITY: 1.8 M/S
ECHO AV REGURGITANT PHT: 545 MS
ECHO AV VELOCITY RATIO: 0.5
ECHO AV VTI: 32.5 CM
ECHO BSA: 2.19 M2
ECHO EST RA PRESSURE: 3 MMHG
ECHO IVC PROX: 1.8 CM
ECHO LA AREA 2C: 20.8 CM2
ECHO LA AREA 4C: 18.8 CM2
ECHO LA MAJOR AXIS: 5.7 CM
ECHO LA MINOR AXIS: 5.9 CM
ECHO LA VOL BP: 54 ML (ref 22–52)
ECHO LA VOL MOD A2C: 60 ML (ref 22–52)
ECHO LA VOL MOD A4C: 48 ML (ref 22–52)
ECHO LA VOL/BSA BIPLANE: 25 ML/M2 (ref 16–34)
ECHO LA VOLUME INDEX MOD A2C: 28 ML/M2 (ref 16–34)
ECHO LA VOLUME INDEX MOD A4C: 22 ML/M2 (ref 16–34)
ECHO LV E' LATERAL VELOCITY: 8.92 CM/S
ECHO LV E' SEPTAL VELOCITY: 7.51 CM/S
ECHO LV EDV 3D: 154 ML
ECHO LV EDV A2C: 131 ML
ECHO LV EDV A4C: 142 ML
ECHO LV EDV INDEX 3D: 72 ML/M2
ECHO LV EDV INDEX A4C: 66 ML/M2
ECHO LV EDV NDEX A2C: 61 ML/M2
ECHO LV EF PHYSICIAN: 60 %
ECHO LV EJECTION FRACTION 3D: 56 %
ECHO LV EJECTION FRACTION A2C: 66 %
ECHO LV EJECTION FRACTION A4C: 63 %
ECHO LV EJECTION FRACTION BIPLANE: 65 % (ref 55–100)
ECHO LV ESV 3D: 68 ML
ECHO LV ESV A2C: 45 ML
ECHO LV ESV A4C: 52 ML
ECHO LV ESV INDEX 3D: 32 ML/M2
ECHO LV ESV INDEX A2C: 21 ML/M2
ECHO LV ESV INDEX A4C: 24 ML/M2
ECHO LV FRACTIONAL SHORTENING: 30 % (ref 28–44)
ECHO LV INTERNAL DIMENSION DIASTOLE INDEX: 2.34 CM/M2
ECHO LV INTERNAL DIMENSION DIASTOLIC: 5 CM (ref 3.9–5.3)
ECHO LV INTERNAL DIMENSION SYSTOLIC INDEX: 1.64 CM/M2
ECHO LV INTERNAL DIMENSION SYSTOLIC: 3.5 CM
ECHO LV IVSD: 0.8 CM (ref 0.6–0.9)
ECHO LV MASS 2D: 146.8 G (ref 67–162)
ECHO LV MASS 3D INDEX: 134.1 G/M2
ECHO LV MASS 3D: 287 G
ECHO LV MASS INDEX 2D: 68.6 G/M2 (ref 43–95)
ECHO LV POSTERIOR WALL DIASTOLIC: 0.9 CM (ref 0.6–0.9)
ECHO LV RELATIVE WALL THICKNESS RATIO: 0.36
ECHO LVOT AREA: 3.1 CM2
ECHO LVOT AV VTI INDEX: 0.63
ECHO LVOT DIAM: 2 CM
ECHO LVOT MEAN GRADIENT: 2 MMHG
ECHO LVOT PEAK GRADIENT: 3 MMHG
ECHO LVOT PEAK VELOCITY: 0.9 M/S
ECHO LVOT STROKE VOLUME INDEX: 29.9 ML/M2
ECHO LVOT SV: 64.1 ML
ECHO LVOT VTI: 20.4 CM
ECHO MV A VELOCITY: 0.83 M/S
ECHO MV AREA VTI: 2.1 CM2
ECHO MV E DECELERATION TIME (DT): 251 MS
ECHO MV E VELOCITY: 0.59 M/S
ECHO MV E/A RATIO: 0.71
ECHO MV E/E' LATERAL: 6.61
ECHO MV E/E' RATIO (AVERAGED): 7.24
ECHO MV E/E' SEPTAL: 7.86
ECHO MV LVOT VTI INDEX: 1.52
ECHO MV MAX VELOCITY: 0.9 M/S
ECHO MV MEAN GRADIENT: 2 MMHG
ECHO MV MEAN VELOCITY: 0.6 M/S
ECHO MV PEAK GRADIENT: 3 MMHG
ECHO MV VTI: 31 CM
ECHO PULMONARY ARTERY END DIASTOLIC PRESSURE: 3 MMHG
ECHO PV MAX VELOCITY: 0.8 M/S
ECHO RA AREA 4C: 14.8 CM2
ECHO RA END SYSTOLIC VOLUME APICAL 4 CHAMBER INDEX BSA: 15 ML/M2
ECHO RA VOLUME: 32 ML
ECHO RIGHT VENTRICULAR SYSTOLIC PRESSURE (RVSP): 13 MMHG
ECHO RV BASAL DIMENSION: 3.1 CM
ECHO RV FREE WALL PEAK S': 17.5 CM/S
ECHO RV GLOBAL SYSTOLIC STRAIN (GLS): -16.2 %
ECHO RV MID DIMENSION: 2.3 CM
ECHO RV TAPSE: 2.4 CM (ref 1.7–?)
ECHO TV REGURGITANT MAX VELOCITY: 1.62 M/S
ECHO TV REGURGITANT PEAK GRADIENT: 10 MMHG

## 2024-12-05 PROCEDURE — G8427 DOCREV CUR MEDS BY ELIG CLIN: HCPCS | Performed by: INTERNAL MEDICINE

## 2024-12-05 PROCEDURE — 3080F DIAST BP >= 90 MM HG: CPT | Performed by: INTERNAL MEDICINE

## 2024-12-05 PROCEDURE — 3017F COLORECTAL CA SCREEN DOC REV: CPT | Performed by: INTERNAL MEDICINE

## 2024-12-05 PROCEDURE — 93356 MYOCRD STRAIN IMG SPCKL TRCK: CPT

## 2024-12-05 PROCEDURE — 93000 ELECTROCARDIOGRAM COMPLETE: CPT | Performed by: INTERNAL MEDICINE

## 2024-12-05 PROCEDURE — 99214 OFFICE O/P EST MOD 30 MIN: CPT | Performed by: INTERNAL MEDICINE

## 2024-12-05 PROCEDURE — 3077F SYST BP >= 140 MM HG: CPT | Performed by: INTERNAL MEDICINE

## 2024-12-05 PROCEDURE — G8484 FLU IMMUNIZE NO ADMIN: HCPCS | Performed by: INTERNAL MEDICINE

## 2024-12-05 PROCEDURE — G8417 CALC BMI ABV UP PARAM F/U: HCPCS | Performed by: INTERNAL MEDICINE

## 2024-12-05 PROCEDURE — 4004F PT TOBACCO SCREEN RCVD TLK: CPT | Performed by: INTERNAL MEDICINE

## 2024-12-05 NOTE — NURSING NOTE
Any reason you can't walk to the procedure room?  no   Do you have a history of falls or dizziness?  no   Do you have any medical conditions that could cause you to need              assistance to the procedure room? no      IF YES TO ANY OF THE ABOVE QUESTIONS, THE PATIENT WAS TRANSPORTED TO THE PROCEDURE ROOM BY WHEELCHAIR ACCORDING TO THE HOSPITAL POLICY

## 2024-12-06 NOTE — PRE SEDATION
intravenously and fentanyl intravenously    Patient is an appropriate candidate for plan of sedation:   yes      Electronically signed by Dann Cortes MD on 12/9/2024 at 8:08 AM

## 2024-12-09 ENCOUNTER — HOSPITAL ENCOUNTER (OUTPATIENT)
Age: 49
Setting detail: OUTPATIENT SURGERY
Discharge: HOME | End: 2024-12-09
Attending: INTERNAL MEDICINE | Admitting: INTERNAL MEDICINE
Payer: MEDICARE

## 2024-12-09 VITALS
OXYGEN SATURATION: 98 % | TEMPERATURE: 98.2 F | RESPIRATION RATE: 32 BRPM | WEIGHT: 217 LBS | HEART RATE: 74 BPM | DIASTOLIC BLOOD PRESSURE: 82 MMHG | HEIGHT: 69 IN | BODY MASS INDEX: 32.14 KG/M2 | SYSTOLIC BLOOD PRESSURE: 150 MMHG

## 2024-12-09 DIAGNOSIS — I20.0 UNSTABLE ANGINA (HCC): ICD-10-CM

## 2024-12-09 DIAGNOSIS — R93.1 ABNORMAL COMPUTED TOMOGRAPHY ANGIOGRAPHY OF HEART: ICD-10-CM

## 2024-12-09 DIAGNOSIS — R06.02 SHORTNESS OF BREATH: ICD-10-CM

## 2024-12-09 LAB
ANION GAP SERPL CALCULATED.3IONS-SCNC: 12 MMOL/L (ref 3–16)
BUN SERPL-MCNC: 10 MG/DL (ref 7–20)
CALCIUM SERPL-MCNC: 10.1 MG/DL (ref 8.3–10.6)
CHLORIDE SERPL-SCNC: 102 MMOL/L (ref 99–110)
CO2 SERPL-SCNC: 25 MMOL/L (ref 21–32)
CREAT SERPL-MCNC: 1 MG/DL (ref 0.6–1.1)
DEPRECATED RDW RBC AUTO: 14.9 % (ref 12.4–15.4)
ECHO BSA: 2.19 M2
EKG ATRIAL RATE: 77 BPM
EKG DIAGNOSIS: NORMAL
EKG P AXIS: 51 DEGREES
EKG P-R INTERVAL: 156 MS
EKG Q-T INTERVAL: 424 MS
EKG QRS DURATION: 82 MS
EKG QTC CALCULATION (BAZETT): 479 MS
EKG R AXIS: 35 DEGREES
EKG T AXIS: -6 DEGREES
EKG VENTRICULAR RATE: 77 BPM
GFR SERPLBLD CREATININE-BSD FMLA CKD-EPI: 69 ML/MIN/{1.73_M2}
GLUCOSE SERPL-MCNC: 100 MG/DL (ref 70–99)
HCT VFR BLD AUTO: 36.7 % (ref 36–48)
HGB BLD-MCNC: 12.6 G/DL (ref 12–16)
MCH RBC QN AUTO: 30.1 PG (ref 26–34)
MCHC RBC AUTO-ENTMCNC: 34.2 G/DL (ref 31–36)
MCV RBC AUTO: 87.9 FL (ref 80–100)
PLATELET # BLD AUTO: 354 K/UL (ref 135–450)
PMV BLD AUTO: 7.6 FL (ref 5–10.5)
POTASSIUM SERPL-SCNC: 3.2 MMOL/L (ref 3.5–5.1)
RBC # BLD AUTO: 4.18 M/UL (ref 4–5.2)
SODIUM SERPL-SCNC: 139 MMOL/L (ref 136–145)
WBC # BLD AUTO: 11.8 K/UL (ref 4–11)

## 2024-12-09 PROCEDURE — 2500000003 HC RX 250 WO HCPCS: Performed by: INTERNAL MEDICINE

## 2024-12-09 PROCEDURE — 85027 COMPLETE CBC AUTOMATED: CPT

## 2024-12-09 PROCEDURE — 93005 ELECTROCARDIOGRAM TRACING: CPT

## 2024-12-09 PROCEDURE — 36415 COLL VENOUS BLD VENIPUNCTURE: CPT

## 2024-12-09 PROCEDURE — 2709999900 HC NON-CHARGEABLE SUPPLY: Performed by: INTERNAL MEDICINE

## 2024-12-09 PROCEDURE — 99153 MOD SED SAME PHYS/QHP EA: CPT | Performed by: INTERNAL MEDICINE

## 2024-12-09 PROCEDURE — 93458 L HRT ARTERY/VENTRICLE ANGIO: CPT | Performed by: INTERNAL MEDICINE

## 2024-12-09 PROCEDURE — 99152 MOD SED SAME PHYS/QHP 5/>YRS: CPT | Performed by: INTERNAL MEDICINE

## 2024-12-09 PROCEDURE — 80048 BASIC METABOLIC PNL TOTAL CA: CPT

## 2024-12-09 PROCEDURE — 6360000004 HC RX CONTRAST MEDICATION: Performed by: INTERNAL MEDICINE

## 2024-12-09 PROCEDURE — 93571 IV DOP VEL&/PRESS C FLO 1ST: CPT | Performed by: INTERNAL MEDICINE

## 2024-12-09 PROCEDURE — C1769 GUIDE WIRE: HCPCS | Performed by: INTERNAL MEDICINE

## 2024-12-09 PROCEDURE — 7100000011 HC PHASE II RECOVERY - ADDTL 15 MIN: Performed by: INTERNAL MEDICINE

## 2024-12-09 PROCEDURE — C1887 CATHETER, GUIDING: HCPCS | Performed by: INTERNAL MEDICINE

## 2024-12-09 PROCEDURE — 6360000002 HC RX W HCPCS: Performed by: INTERNAL MEDICINE

## 2024-12-09 PROCEDURE — C1894 INTRO/SHEATH, NON-LASER: HCPCS | Performed by: INTERNAL MEDICINE

## 2024-12-09 PROCEDURE — 7100000010 HC PHASE II RECOVERY - FIRST 15 MIN: Performed by: INTERNAL MEDICINE

## 2024-12-09 RX ORDER — FENTANYL CITRATE 50 UG/ML
INJECTION, SOLUTION INTRAMUSCULAR; INTRAVENOUS PRN
Status: DISCONTINUED | OUTPATIENT
Start: 2024-12-09 | End: 2024-12-09 | Stop reason: HOSPADM

## 2024-12-09 RX ORDER — SODIUM CHLORIDE 0.9 % (FLUSH) 0.9 %
5-40 SYRINGE (ML) INJECTION EVERY 12 HOURS SCHEDULED
Status: CANCELLED | OUTPATIENT
Start: 2024-12-09

## 2024-12-09 RX ORDER — HEPARIN SODIUM 1000 [USP'U]/ML
INJECTION, SOLUTION INTRAVENOUS; SUBCUTANEOUS PRN
Status: DISCONTINUED | OUTPATIENT
Start: 2024-12-09 | End: 2024-12-09 | Stop reason: HOSPADM

## 2024-12-09 RX ORDER — ACETAMINOPHEN 325 MG/1
650 TABLET ORAL EVERY 4 HOURS PRN
Status: CANCELLED | OUTPATIENT
Start: 2024-12-09

## 2024-12-09 RX ORDER — SODIUM CHLORIDE 9 MG/ML
INJECTION, SOLUTION INTRAVENOUS CONTINUOUS
Status: CANCELLED | OUTPATIENT
Start: 2024-12-09

## 2024-12-09 RX ORDER — MIDAZOLAM HYDROCHLORIDE 1 MG/ML
INJECTION, SOLUTION INTRAMUSCULAR; INTRAVENOUS PRN
Status: DISCONTINUED | OUTPATIENT
Start: 2024-12-09 | End: 2024-12-09 | Stop reason: HOSPADM

## 2024-12-09 RX ORDER — SODIUM CHLORIDE 0.9 % (FLUSH) 0.9 %
5-40 SYRINGE (ML) INJECTION PRN
Status: CANCELLED | OUTPATIENT
Start: 2024-12-09

## 2024-12-09 RX ORDER — IOPAMIDOL 755 MG/ML
INJECTION, SOLUTION INTRAVASCULAR PRN
Status: DISCONTINUED | OUTPATIENT
Start: 2024-12-09 | End: 2024-12-09 | Stop reason: HOSPADM

## 2024-12-09 RX ORDER — SODIUM CHLORIDE 9 MG/ML
INJECTION, SOLUTION INTRAVENOUS PRN
Status: CANCELLED | OUTPATIENT
Start: 2024-12-09

## 2024-12-09 RX ORDER — ADENOSINE 3 MG/ML
INJECTION, SOLUTION INTRAVENOUS CONTINUOUS PRN
Status: DISCONTINUED | OUTPATIENT
Start: 2024-12-09 | End: 2024-12-09 | Stop reason: HOSPADM

## 2024-12-09 NOTE — PROGRESS NOTES
Patient/family given discharge instructions. Patient/family verbalize understanding of discharge instructions, all questions addressed, copy given to patient/family. PIV removed. No complications noted. Pt transferred to vehicle via wheelchair to be discharged home with family.    
Pt. Received from procedure room in stable condition. Pt alert and oriented, RR easy and unlabored, vss. Physician at bedside updating pt. and family at plan of care. Pt. Provided with snack/drink. No further needs at this time.   
and TR     Cath:    Other imaging:   Coronary CTA 11/25/2024  CARDIAC     CT scan sent for heartflow analysis. Please consider results to be  preliminary. Report will be updated with CT-FFR data when available.     Moderate possibly obstructive proximal LAD stenosis     Mild non-obstructive appearing mid LAD stenosis     Moderate possibly obstructive mid RCA disease     Focal anterior pericardial thickening and trivial pericardial effusion which  may indicate acute pericarditis in the appropriate clinical setting     There is an atrial septal closure device present     Imaging is limited to a single 5% phase which limits specificity for  significant stenosis    Cardiac MRI 5/20/2024  Normal LV and RV size and systolic function.  Bidirectional shunt found at the level of the interatrial septum (defect not  well see due to artifact), likely represents small ASD.   Calculated Qp:Qs of 1     Normal left ventricular size and systolic function with a calculated ejection  fraction of 56% by Austin's method.  Normal right ventricular size and systolic function with a calculated ejection  fraction of 54% by Austin's method.  Interatrial septum: appears aneurysmal. There appears to be predominant left to  right shunting between the left and right atrium. Findings are suggestive of a  small ASD. There also appears to be intermittent right to left shunting.   No myocardial edema by T2w imaging/mapping. (Normal myocardium/skeletal ratio -  normal < 1.9).  No significant intracardiac shunt. Calculated Qp:Qs:1 (Phase contrast velocity  encoding Ao/Pa)  Normal myocardial resting perfusion imaging.  On delayed enhancement imaging, there is no abnormal hyperenhancement to  suggest myocardial scar/inflammation/infiltration.     The MRI sequences and imaging planes in this study were tailored for cardiac  imaging and are suboptimal for evaluation of non-cardiac structures.    NOEMI 4/29/2024  Normal LV size and function.  LVEF

## 2024-12-09 NOTE — DISCHARGE INSTRUCTIONS
Radial Angiogram      Care of your puncture site:  Remove clear bandage 24 hours after the procedure.  May shower in 24 hours  Inspect the site daily and gently clean using soap and water.  Dry thoroughly and apply a Band-Aid.    Normal Observations:  Soreness or tenderness which may last one week.  Mild oozing from the incision site.  Possible bruising that could last 2 weeks.    Activity:  You may resume driving 24 hours following the procedure.  You may resume normal activity in 3 days or after the wound heals.  Avoid lifting more than 10 pounds for 3 days with affected arm.    Nutrition:  Regular diet  Drink at least 8 to 10 glasses of decaffeinated, non-alcoholic fluid for the next 24 hours to flush the x-ray dye used for your angiogram out of your body.    Call your doctor immediately if your condition worsens, for any other concerns, for a follow-up appointment or if you experience any of the following:  Significant bleeding that does not stop after 10 minutes of applying firm pressure on the puncture site.  Increased swelling of the wrist.  Unusual pain, numbness, or tingling of the wrist/arm.   Any signs of infection such as: redness, yellow drainage at the site, swelling or pain.

## 2024-12-09 NOTE — H&P
1/4/2024          Assessment and Plan     Chest pain  Abnormal CTA  She is scheduled for LHC on 12/9.     PFO  S/p PFO closure 7/3/2024. Continue Eliquis and Asa. Needs to have antibiotics prior to any dental procedure.     Cryptogenic stoke  No recurrence. Continue Asa and Plavix.     Essential hypertension  Controlled. Goal BP <130/80. Continue medical therapy.    PVC's  Scheduled for PVC ablation 5/14/2024 with Dr De La Garza. Continue B-blocker.     Nicotine Addiction  Encouraged smoking cessation but patient is in the contemplative stage. 5 minutes spent in counseling.     Follow up in 6 months.       Thank you for allowing us to participate in the care of Brittany Mccall.  Please do not hesitate to contact me if you have any questions.    Thee Liu MD, MPH    [facility]  [address], [address]   [place] [address]  Ph: [phone]  Fax: [phone]    I have reviewed the history and physical and examined the patient and find no relevant changes in the history and physical exam, including heart and lung sounds  I have reviewed with the patient and/or family the risks, benefits, and alternatives to the procedure.    Based on these findings I recommend left heart cath for definitive evaluation of coronary arteries.  Risks, benefits, expectations, and alternative treatments were discussed.  Questions appropriately answered.  Brittanygamaliel Mccall agrees to proceed and verbalized understanding.       Dann Cortes MD 12/9/2024 9:53 AM

## 2024-12-20 ENCOUNTER — OFFICE VISIT (OUTPATIENT)
Dept: CARDIOLOGY CLINIC | Age: 49
End: 2024-12-20

## 2024-12-20 VITALS
WEIGHT: 222 LBS | HEIGHT: 69 IN | BODY MASS INDEX: 32.88 KG/M2 | OXYGEN SATURATION: 97 % | SYSTOLIC BLOOD PRESSURE: 156 MMHG | DIASTOLIC BLOOD PRESSURE: 80 MMHG | HEART RATE: 64 BPM

## 2024-12-20 DIAGNOSIS — I49.3 PVC (PREMATURE VENTRICULAR CONTRACTION): ICD-10-CM

## 2024-12-20 DIAGNOSIS — Z86.73 H/O ISCHEMIC LEFT PCA STROKE: ICD-10-CM

## 2024-12-20 DIAGNOSIS — I63.9 CRYPTOGENIC STROKE (HCC): ICD-10-CM

## 2024-12-20 DIAGNOSIS — Z72.0 TOBACCO ABUSE: ICD-10-CM

## 2024-12-20 DIAGNOSIS — I31.39 PERICARDIAL EFFUSION: ICD-10-CM

## 2024-12-20 DIAGNOSIS — I47.29 RVOT VENTRICULAR TACHYCARDIA (HCC): ICD-10-CM

## 2024-12-20 DIAGNOSIS — Q21.10 ATRIAL SEPTAL DEFECT: ICD-10-CM

## 2024-12-20 DIAGNOSIS — R07.9 CHEST PAIN, UNSPECIFIED TYPE: Primary | ICD-10-CM

## 2024-12-20 DIAGNOSIS — Z87.74 S/P DEVICE CLOSURE OF ATRIAL SEPTAL DEFECT: ICD-10-CM

## 2024-12-20 DIAGNOSIS — R06.09 DOE (DYSPNEA ON EXERTION): ICD-10-CM

## 2024-12-20 DIAGNOSIS — E78.00 PURE HYPERCHOLESTEROLEMIA: ICD-10-CM

## 2024-12-20 DIAGNOSIS — I10 ESSENTIAL HYPERTENSION: ICD-10-CM

## 2024-12-20 DIAGNOSIS — I25.10 CAD IN NATIVE ARTERY: ICD-10-CM

## 2024-12-20 DIAGNOSIS — F17.200 SMOKING: ICD-10-CM

## 2024-12-20 RX ORDER — ROSUVASTATIN CALCIUM 40 MG/1
40 TABLET, COATED ORAL DAILY
Qty: 90 TABLET | Refills: 3 | Status: SHIPPED | OUTPATIENT
Start: 2024-12-20

## 2024-12-20 RX ORDER — CARVEDILOL 25 MG/1
25 TABLET ORAL 2 TIMES DAILY
Qty: 180 TABLET | Refills: 3 | Status: SHIPPED | OUTPATIENT
Start: 2024-12-20

## 2024-12-20 NOTE — PATIENT INSTRUCTIONS
Follow up with Dr Ewing in 6 months     Recommend smoking cessation.     Increase Carvedilol to 25 mg twice daily   Increase Crestor to 40 mg daily     Referral to Pulmonary     PFT testing soon     Call for any questions or concerns.

## 2025-01-09 ENCOUNTER — HOSPITAL ENCOUNTER (OUTPATIENT)
Age: 50
Discharge: HOME | End: 2025-01-11
Attending: INTERNAL MEDICINE
Payer: MEDICARE

## 2025-01-09 VITALS
SYSTOLIC BLOOD PRESSURE: 130 MMHG | BODY MASS INDEX: 32.14 KG/M2 | OXYGEN SATURATION: 93 % | WEIGHT: 217 LBS | TEMPERATURE: 98.3 F | DIASTOLIC BLOOD PRESSURE: 73 MMHG | HEART RATE: 69 BPM | HEIGHT: 69 IN | RESPIRATION RATE: 19 BRPM

## 2025-01-09 DIAGNOSIS — R93.1 ABNORMAL FINDINGS ON DIAGNOSTIC IMAGING OF HEART AND CORONARY CIRCULATION: ICD-10-CM

## 2025-01-09 DIAGNOSIS — Z86.73 H/O ISCHEMIC LEFT PCA STROKE: ICD-10-CM

## 2025-01-09 DIAGNOSIS — I63.9 CRYPTOGENIC STROKE (HCC): ICD-10-CM

## 2025-01-09 DIAGNOSIS — Z87.74 S/P DEVICE CLOSURE OF ATRIAL SEPTAL DEFECT: ICD-10-CM

## 2025-01-09 DIAGNOSIS — Q21.10 ATRIAL SEPTAL DEFECT: ICD-10-CM

## 2025-01-09 DIAGNOSIS — T82.9XXD: ICD-10-CM

## 2025-01-09 LAB — ECHO BSA: 2.19 M2

## 2025-01-09 PROCEDURE — 7100000010 HC PHASE II RECOVERY - FIRST 15 MIN: Performed by: INTERNAL MEDICINE

## 2025-01-09 PROCEDURE — 7100000011 HC PHASE II RECOVERY - ADDTL 15 MIN: Performed by: INTERNAL MEDICINE

## 2025-01-09 PROCEDURE — 99152 MOD SED SAME PHYS/QHP 5/>YRS: CPT | Performed by: INTERNAL MEDICINE

## 2025-01-09 PROCEDURE — 6370000000 HC RX 637 (ALT 250 FOR IP): Performed by: INTERNAL MEDICINE

## 2025-01-09 PROCEDURE — 93325 DOPPLER ECHO COLOR FLOW MAPG: CPT

## 2025-01-09 RX ORDER — SODIUM CHLORIDE 0.9 % (FLUSH) 0.9 %
5-40 SYRINGE (ML) INJECTION EVERY 12 HOURS SCHEDULED
Status: DISCONTINUED | OUTPATIENT
Start: 2025-01-09 | End: 2025-01-12 | Stop reason: HOSPADM

## 2025-01-09 RX ORDER — SODIUM CHLORIDE 9 MG/ML
INJECTION, SOLUTION INTRAVENOUS PRN
Status: DISCONTINUED | OUTPATIENT
Start: 2025-01-09 | End: 2025-01-12 | Stop reason: HOSPADM

## 2025-01-09 RX ORDER — LIDOCAINE HYDROCHLORIDE 20 MG/ML
SOLUTION OROPHARYNGEAL PRN
Status: COMPLETED | OUTPATIENT
Start: 2025-01-09 | End: 2025-01-09

## 2025-01-09 RX ORDER — SODIUM CHLORIDE 0.9 % (FLUSH) 0.9 %
5-40 SYRINGE (ML) INJECTION PRN
Status: DISCONTINUED | OUTPATIENT
Start: 2025-01-09 | End: 2025-01-12 | Stop reason: HOSPADM

## 2025-01-09 RX ADMIN — LIDOCAINE HYDROCHLORIDE 15 ML: 20 SOLUTION ORAL; TOPICAL at 08:03

## 2025-01-09 NOTE — H&P
NOEMI H & P    Patient seen, examined.  I have confirmed no interval change since my last visit.    BP (!) 143/84   Pulse 72   Temp 98.3 °F (36.8 °C)   Resp 15   Ht 1.753 m (5' 9\")   Wt 98.4 kg (217 lb)   LMP 04/11/2015 (Exact Date)   SpO2 100%   BMI 32.05 kg/m²     Full office visit done < 30 days ago on 12/20/2024  Please refer to that note for full details.  There has been no interval change.    Indication for NOEMI: evaluate for leak in PFO device    Procedure discussed with patient  Risks/benefits/alternatives discussed.    Will proceed with NOEMI.

## 2025-01-09 NOTE — PROGRESS NOTES
Brief immediate postprocedure note       NOEMI performed w/o complications      Anesthesia: 1% viscous lidocaine, cetacaine spray   Sedation:  mg IV versed   Analgesia: 100 mcg IV fentanyl     Probe inserted easily and removed easily   Findings: ASD device is well-seated.  There is a small leak notable with a scant number of bubbles on agitated saline.  Normal LV function      Complications: none   EBL: none   Specimens: N/A       CONSCIOUS SEDATION: Conscious sedation was given. The medication documented above was administered during the study.There was a trained observer present throughout the   entire time during which sedation was administered. Moderate sedation time   was 15 minutes.       Recommendations    I called Dr Liu to discuss - given presence of shunt and two confirmed strokes will stay on Eliquis for at least 6 months.  Plan to repeat NOEMI in about 6 months.  Device will likely mature more / once fully endothelialized if no more shunting apparent can stop Eliquis    F/U with me in June w surface echo as already scheduled.

## 2025-01-09 NOTE — SEDATION DOCUMENTATION
Brief Pre-Op Note/Sedation Assessment      Brittany Mccall  1975  Room/bed info not found      [id number]  8:11 AM    Planned Procedure: Transesophageal Echocardiogram     Post Procedure Plan: Return to same level of care    Consent: I have discussed with the patient and/or the patient representative the indication, alternatives, and the possible risks and/or complications of the planned procedure and the anesthesia methods. The patient and/or patient representative appear to understand and agree to proceed.    Indications for Procedure:  s/p ASD closure, evaluate for residual device leak    Current blood thinners  Apixaban, aspirin    Vital Signs:  BP (!) 143/84   Pulse 72   Temp 98.3 °F (36.8 °C)   Resp 15   Ht 1.753 m (5' 9\")   Wt 98.4 kg (217 lb)   LMP 04/11/2015 (Exact Date)   SpO2 100%   BMI 32.05 kg/m²     Allergies:  Allergies   Allergen Reactions    Prednisone Other (See Comments)     Anxiousness, insomnia, aggression    Amoxicillin-Pot Clavulanate     Bee Venom     Hydrochlorothiazide     Amoxicillin Nausea And Vomiting       Past Medical History:  Past Medical History:   Diagnosis Date    Anxiety     Cerebral artery occlusion with cerebral infarction (HCC) 11/28/23    DVT (deep vein thrombosis) in pregnancy     left leg in small vessel    Hypertension     Low blood potassium     5/2015 patient states had recent critically low kt       Surgical History:  Past Surgical History:   Procedure Laterality Date    ABDOMINAL EXPLORATION SURGERY  8/12/15    with repair of vagina     CARDIAC PROCEDURE N/A 7/3/2024    Patent foramen ovale closure performed by Iam Olivares MD at [facility] CARDIAC CATH LAB    CARDIAC PROCEDURE N/A 12/9/2024    Left heart cath / coronary angiography performed by Dann Cortes MD at [facility] CARDIAC CATH LAB    CARDIAC PROCEDURE N/A 12/9/2024    Fractional flow reserve (FFR) performed by Dann Cortes MD at [facility] CARDIAC CATH LAB    DILATION AND CURETTAGE OF UTERUS

## 2025-01-24 ENCOUNTER — PATIENT MESSAGE (OUTPATIENT)
Dept: CARDIOLOGY CLINIC | Age: 50
End: 2025-01-24

## 2025-01-24 ENCOUNTER — PATIENT MESSAGE (OUTPATIENT)
Dept: FAMILY MEDICINE CLINIC | Age: 50
End: 2025-01-24

## 2025-01-24 DIAGNOSIS — F41.1 GENERALIZED ANXIETY DISORDER: ICD-10-CM

## 2025-01-24 DIAGNOSIS — E87.6 HYPOKALEMIA: Primary | ICD-10-CM

## 2025-01-24 DIAGNOSIS — G43.719 INTRACTABLE CHRONIC MIGRAINE WITHOUT AURA AND WITHOUT STATUS MIGRAINOSUS: ICD-10-CM

## 2025-01-24 RX ORDER — CLONAZEPAM 0.5 MG/1
0.5 TABLET ORAL 2 TIMES DAILY PRN
Qty: 14 TABLET | Refills: 0 | Status: SHIPPED | OUTPATIENT
Start: 2025-01-24 | End: 2025-01-31

## 2025-01-24 RX ORDER — POTASSIUM CHLORIDE 750 MG/1
10 TABLET, EXTENDED RELEASE ORAL DAILY
Qty: 30 TABLET | Refills: 0 | Status: SHIPPED | OUTPATIENT
Start: 2025-01-24

## 2025-01-24 RX ORDER — SUMATRIPTAN SUCCINATE 100 MG/1
100 TABLET ORAL
Qty: 9 TABLET | Refills: 0 | Status: SHIPPED | OUTPATIENT
Start: 2025-01-24 | End: 2025-01-24

## 2025-03-26 ENCOUNTER — OFFICE VISIT (OUTPATIENT)
Dept: FAMILY MEDICINE CLINIC | Age: 50
End: 2025-03-26
Payer: MEDICARE

## 2025-03-26 VITALS
WEIGHT: 218 LBS | SYSTOLIC BLOOD PRESSURE: 128 MMHG | BODY MASS INDEX: 32.29 KG/M2 | OXYGEN SATURATION: 99 % | DIASTOLIC BLOOD PRESSURE: 86 MMHG | HEIGHT: 69 IN | HEART RATE: 65 BPM

## 2025-03-26 DIAGNOSIS — H60.503 ACUTE OTITIS EXTERNA OF BOTH EARS, UNSPECIFIED TYPE: ICD-10-CM

## 2025-03-26 DIAGNOSIS — F33.1 MAJOR DEPRESSIVE DISORDER, RECURRENT, MODERATE (HCC): ICD-10-CM

## 2025-03-26 DIAGNOSIS — I47.29 RVOT VENTRICULAR TACHYCARDIA (HCC): ICD-10-CM

## 2025-03-26 DIAGNOSIS — H69.93 EUSTACHIAN TUBE DYSFUNCTION, BILATERAL: ICD-10-CM

## 2025-03-26 DIAGNOSIS — B37.0 THRUSH: ICD-10-CM

## 2025-03-26 DIAGNOSIS — J40 BRONCHITIS: Primary | ICD-10-CM

## 2025-03-26 DIAGNOSIS — L70.0 ACNE VULGARIS: ICD-10-CM

## 2025-03-26 PROCEDURE — 3074F SYST BP LT 130 MM HG: CPT | Performed by: NURSE PRACTITIONER

## 2025-03-26 PROCEDURE — 3079F DIAST BP 80-89 MM HG: CPT | Performed by: NURSE PRACTITIONER

## 2025-03-26 PROCEDURE — 99214 OFFICE O/P EST MOD 30 MIN: CPT | Performed by: NURSE PRACTITIONER

## 2025-03-26 RX ORDER — ALBUTEROL SULFATE 90 UG/1
2 INHALANT RESPIRATORY (INHALATION) 4 TIMES DAILY PRN
Qty: 18 G | Refills: 0 | Status: SHIPPED | OUTPATIENT
Start: 2025-03-26

## 2025-03-26 RX ORDER — IPRATROPIUM BROMIDE 42 UG/1
2 SPRAY, METERED NASAL 4 TIMES DAILY
Qty: 15 ML | Refills: 3 | Status: SHIPPED | OUTPATIENT
Start: 2025-03-26

## 2025-03-26 RX ORDER — CLINDAMYCIN AND BENZOYL PEROXIDE 10; 50 MG/G; MG/G
GEL TOPICAL
Qty: 50 G | Refills: 2 | Status: SHIPPED | OUTPATIENT
Start: 2025-03-26 | End: 2025-03-28

## 2025-03-26 RX ORDER — AZITHROMYCIN 250 MG/1
TABLET, FILM COATED ORAL
Qty: 6 TABLET | Refills: 0 | Status: SHIPPED | OUTPATIENT
Start: 2025-03-26 | End: 2025-04-05

## 2025-03-26 RX ORDER — NEOMYCIN SULFATE, POLYMYXIN B SULFATE, HYDROCORTISONE 3.5; 10000; 1 MG/ML; [USP'U]/ML; MG/ML
4 SOLUTION/ DROPS AURICULAR (OTIC) 3 TIMES DAILY
Qty: 10 ML | Refills: 0 | Status: SHIPPED | OUTPATIENT
Start: 2025-03-26 | End: 2025-04-05

## 2025-03-26 RX ORDER — NYSTATIN 100000 [USP'U]/ML
500000 SUSPENSION ORAL 4 TIMES DAILY
Qty: 200 ML | Refills: 0 | Status: SHIPPED | OUTPATIENT
Start: 2025-03-26 | End: 2025-04-05

## 2025-03-26 SDOH — ECONOMIC STABILITY: FOOD INSECURITY: WITHIN THE PAST 12 MONTHS, YOU WORRIED THAT YOUR FOOD WOULD RUN OUT BEFORE YOU GOT MONEY TO BUY MORE.: NEVER TRUE

## 2025-03-26 SDOH — ECONOMIC STABILITY: FOOD INSECURITY: WITHIN THE PAST 12 MONTHS, THE FOOD YOU BOUGHT JUST DIDN'T LAST AND YOU DIDN'T HAVE MONEY TO GET MORE.: NEVER TRUE

## 2025-03-26 ASSESSMENT — PATIENT HEALTH QUESTIONNAIRE - PHQ9
4. FEELING TIRED OR HAVING LITTLE ENERGY: NOT AT ALL
SUM OF ALL RESPONSES TO PHQ QUESTIONS 1-9: 0
1. LITTLE INTEREST OR PLEASURE IN DOING THINGS: NOT AT ALL
6. FEELING BAD ABOUT YOURSELF - OR THAT YOU ARE A FAILURE OR HAVE LET YOURSELF OR YOUR FAMILY DOWN: NOT AT ALL
10. IF YOU CHECKED OFF ANY PROBLEMS, HOW DIFFICULT HAVE THESE PROBLEMS MADE IT FOR YOU TO DO YOUR WORK, TAKE CARE OF THINGS AT HOME, OR GET ALONG WITH OTHER PEOPLE: NOT DIFFICULT AT ALL
2. FEELING DOWN, DEPRESSED OR HOPELESS: NOT AT ALL
SUM OF ALL RESPONSES TO PHQ QUESTIONS 1-9: 0
9. THOUGHTS THAT YOU WOULD BE BETTER OFF DEAD, OR OF HURTING YOURSELF: NOT AT ALL
SUM OF ALL RESPONSES TO PHQ QUESTIONS 1-9: 0
7. TROUBLE CONCENTRATING ON THINGS, SUCH AS READING THE NEWSPAPER OR WATCHING TELEVISION: NOT AT ALL
3. TROUBLE FALLING OR STAYING ASLEEP: NOT AT ALL
8. MOVING OR SPEAKING SO SLOWLY THAT OTHER PEOPLE COULD HAVE NOTICED. OR THE OPPOSITE, BEING SO FIGETY OR RESTLESS THAT YOU HAVE BEEN MOVING AROUND A LOT MORE THAN USUAL: NOT AT ALL
SUM OF ALL RESPONSES TO PHQ QUESTIONS 1-9: 0
5. POOR APPETITE OR OVEREATING: NOT AT ALL

## 2025-03-26 NOTE — ASSESSMENT & PLAN NOTE
The patient has numerous cardiac updates since last seen in office.  Given acute nature of visit as well as multiple concerns, [facility] months.  Continue to follow with cardiology for management.  No indications to change plan of care with cardiology based upon today's visit.

## 2025-03-26 NOTE — ASSESSMENT & PLAN NOTE
Chronic.  Following with psych for management.  No indication to change plan of care based upon today's visit.  Continue with management by them.

## 2025-03-26 NOTE — ASSESSMENT & PLAN NOTE
The patient has erythema to the bilateral canals without scaling.  Given significant pain and irritation, will treat with Cortisporin.  Follow-up as needed.    Orders:    neomycin-polymyxin-hydrocortisone (CORTISPORIN) 3.5-80704-0 otic solution; Place 4 drops in ear(s) 3 times daily for 10 days Instill into both Ears

## 2025-03-26 NOTE — PROGRESS NOTES
Brittany Mccall (:  1975) is a 49 y.o. female,Established patient, here for evaluation of the following chief complaint(s):  Chest Congestion (Chest congestion, shortness of breath, ear pain for the past month.  Developed mouth sores over the past few days.  Lots of cardiac updates.), Ear Pain, and Mouth Lesions      ASSESSMENT/PLAN:  Assessment & Plan  Bronchitis    Presentation consistent with bronchitis.  Treating with Z-Karson and albuterol.  The patient has significant agitation whenever taking oral steroids, so trying to avoid.  Follow-up as needed.    Orders:    azithromycin (ZITHROMAX) 250 MG tablet; 500mg on day 1 followed by 250mg on days 2 - 5    albuterol sulfate HFA (VENTOLIN HFA) 108 (90 Base) MCG/ACT inhaler; Inhale 2 puffs into the lungs 4 times daily as needed for Wheezing    Acute otitis externa of both ears, unspecified type    The patient has erythema to the bilateral canals without scaling.  Given significant pain and irritation, will treat with Cortisporin.  Follow-up as needed.    Orders:    neomycin-polymyxin-hydrocortisone (CORTISPORIN) 3.5-54152-4 otic solution; Place 4 drops in ear(s) 3 times daily for 10 days Instill into both Ears    Eustachian tube dysfunction, bilateral    Patient has uncontrolled eustachian tube dysfunction despite Flonase.  Has been going on for 6 months.  Chronic issue.  Changed to ipratropium.  Follow-up as needed.    Orders:    ipratropium (ATROVENT) 0.06 % nasal spray; 2 sprays by Each Nostril route 4 times daily    Thrush    The patient has signs of thrush.  Treat with nystatin suspension.  Follow-up as needed.    Orders:    nystatin (MYCOSTATIN) 841336 UNIT/ML suspension; Take 5 mLs by mouth 4 times daily for 10 days Retain in mouth as long as possible    Acne vulgaris    The patient has multiple open sores from scratching.  States that this is related to her mood but tends to start with any acne.  Treat with BenzaClin.  Follow-up as needed.    Orders:

## 2025-03-27 ASSESSMENT — ENCOUNTER SYMPTOMS
ABDOMINAL PAIN: 0
ABDOMINAL DISTENTION: 0
EYE PAIN: 0
VOMITING: 0
WHEEZING: 0
NAUSEA: 0
COUGH: 1
EYE REDNESS: 0
SINUS PAIN: 1
RHINORRHEA: 1
DIARRHEA: 0
SORE THROAT: 0
EYE DISCHARGE: 0
SINUS PRESSURE: 1
SHORTNESS OF BREATH: 1

## 2025-03-28 ENCOUNTER — TELEPHONE (OUTPATIENT)
Dept: FAMILY MEDICINE CLINIC | Age: 50
End: 2025-03-28

## 2025-03-28 DIAGNOSIS — L70.0 ACNE VULGARIS: Primary | ICD-10-CM

## 2025-03-28 RX ORDER — CLINDAMYCIN PHOSPHATE 10 MG/G
GEL TOPICAL
Qty: 60 G | Refills: 0 | Status: SHIPPED | OUTPATIENT
Start: 2025-03-28 | End: 2025-04-04

## 2025-03-28 NOTE — TELEPHONE ENCOUNTER
Rx sent for clindagel.  This has the same antibiotic component but does not have the benzyl peroxide in it.  Benzyl peroxide can be bought over-the-counter, so she can get this to apply along with the Clindagel.  Still twice a day for both.

## 2025-03-28 NOTE — TELEPHONE ENCOUNTER
Patient was calling to say that she will need a prior Auth for the ointment for her face. She is wondering if Daniel can just send her something else.     [facility]       Phone number

## 2025-05-14 PROBLEM — I25.10 CAD IN NATIVE ARTERY: Status: ACTIVE | Noted: 2025-05-14

## 2025-05-14 NOTE — PROGRESS NOTES
noted above.  Pericardial effusion - resolved  Tobacco abuse - advised cessation  S/p ASD closure - stable, no residual shunt on last echo, no longer requires apixaban    25    Medication changes today - Change Carvedilol to 12.5mg BID   Continue risk factor modifications   Call for any new or worsening symptoms.     All new cardiac testing and lab results personally reviewed by me during this office visit and discussed with patient.    Patient counseled on lifestyle modification, diet, and exercise.    Follow up: 2 months     Testin day holter     Quintin Ewing MD  Cardiologist [facility]    Scribe Attestation: This note was scribed in the presence of Dr. Ewing by Mile Garza RN.    Physician Attestation  The scribe wrote this note in the presence of me (Quintin Ewing MD).  The scribe may have prepopulated components of this note with my historical  intellectual property under my direct supervision.  Any additions to this intellectual property were performed in my presence and at my direction.  Furthermore, the content and accuracy of this note have been reviewed by me with edits by me as needed.  Quintin Ewing MD 2025 9:31 AM

## 2025-05-24 ENCOUNTER — APPOINTMENT (OUTPATIENT)
Dept: GENERAL RADIOLOGY | Age: 50
DRG: 313 | End: 2025-05-24
Payer: MEDICARE

## 2025-05-24 ENCOUNTER — APPOINTMENT (OUTPATIENT)
Dept: CT IMAGING | Age: 50
DRG: 313 | End: 2025-05-24
Payer: MEDICARE

## 2025-05-24 ENCOUNTER — HOSPITAL ENCOUNTER (INPATIENT)
Age: 50
LOS: 1 days | Discharge: HOME | DRG: 313 | End: 2025-05-25
Attending: STUDENT IN AN ORGANIZED HEALTH CARE EDUCATION/TRAINING PROGRAM | Admitting: STUDENT IN AN ORGANIZED HEALTH CARE EDUCATION/TRAINING PROGRAM
Payer: MEDICARE

## 2025-05-24 DIAGNOSIS — R07.9 CHEST PAIN, UNSPECIFIED TYPE: Primary | ICD-10-CM

## 2025-05-24 LAB
ALBUMIN SERPL-MCNC: 4.2 G/DL (ref 3.4–5)
ALBUMIN/GLOB SERPL: 1.2 {RATIO} (ref 1.1–2.2)
ALP SERPL-CCNC: 96 U/L (ref 40–129)
ALT SERPL-CCNC: 21 U/L (ref 10–40)
ANION GAP SERPL CALCULATED.3IONS-SCNC: 14 MMOL/L (ref 3–16)
APTT BLD: 30.3 SEC (ref 22.1–36.4)
AST SERPL-CCNC: 27 U/L (ref 15–37)
BASE EXCESS BLDV CALC-SCNC: 0.7 MMOL/L (ref -3–3)
BASOPHILS # BLD: 0 K/UL (ref 0–0.2)
BASOPHILS NFR BLD: 0.3 %
BILIRUB SERPL-MCNC: <0.2 MG/DL (ref 0–1)
BUN SERPL-MCNC: 6 MG/DL (ref 7–20)
CALCIUM SERPL-MCNC: 10.5 MG/DL (ref 8.3–10.6)
CHLORIDE SERPL-SCNC: 103 MMOL/L (ref 99–110)
CO2 BLDV-SCNC: 64 MMOL/L
CO2 SERPL-SCNC: 24 MMOL/L (ref 21–32)
COHGB MFR BLDV: 16 % (ref 0–1.5)
CREAT SERPL-MCNC: 1 MG/DL (ref 0.6–1.1)
DEPRECATED RDW RBC AUTO: 14.7 % (ref 12.4–15.4)
DO-HGB MFR BLDV: 6 %
EOSINOPHIL # BLD: 0.4 K/UL (ref 0–0.6)
EOSINOPHIL NFR BLD: 2.5 %
GFR SERPLBLD CREATININE-BSD FMLA CKD-EPI: 68 ML/MIN/{1.73_M2}
GLUCOSE SERPL-MCNC: 93 MG/DL (ref 70–99)
HCO3 BLDV-SCNC: 27 MMOL/L (ref 23–29)
HCT VFR BLD AUTO: 37.1 % (ref 36–48)
HGB BLD-MCNC: 13.2 G/DL (ref 12–16)
INR PPP: 0.87 (ref 0.85–1.15)
LYMPHOCYTES # BLD: 4.6 K/UL (ref 1–5.1)
LYMPHOCYTES NFR BLD: 30.2 %
MCH RBC QN AUTO: 31.5 PG (ref 26–34)
MCHC RBC AUTO-ENTMCNC: 35.5 G/DL (ref 31–36)
MCV RBC AUTO: 88.7 FL (ref 80–100)
METHGB MFR BLDV: 0.7 %
MONOCYTES # BLD: 0.9 K/UL (ref 0–1.3)
MONOCYTES NFR BLD: 6.2 %
NEUTROPHILS # BLD: 9.2 K/UL (ref 1.7–7.7)
NEUTROPHILS NFR BLD: 60.8 %
NT-PROBNP SERPL-MCNC: 495 PG/ML (ref 0–124)
O2 CT VFR BLDV CALC: 13 VOL %
O2 THERAPY: ABNORMAL
PCO2 BLDV: 49.1 MMHG (ref 40–50)
PH BLDV: 7.35 [PH] (ref 7.35–7.45)
PLATELET # BLD AUTO: 320 K/UL (ref 135–450)
PMV BLD AUTO: 7.7 FL (ref 5–10.5)
PO2 BLDV: 56.6 MMHG (ref 25–40)
POTASSIUM SERPL-SCNC: 3 MMOL/L (ref 3.5–5.1)
PROT SERPL-MCNC: 7.7 G/DL (ref 6.4–8.2)
PROTHROMBIN TIME: 12.1 SEC (ref 11.9–14.9)
RBC # BLD AUTO: 4.18 M/UL (ref 4–5.2)
SAO2 % BLDV: 93 %
SODIUM SERPL-SCNC: 141 MMOL/L (ref 136–145)
TROPONIN, HIGH SENSITIVITY: <6 NG/L (ref 0–14)
WBC # BLD AUTO: 15.2 K/UL (ref 4–11)

## 2025-05-24 PROCEDURE — 84484 ASSAY OF TROPONIN QUANT: CPT

## 2025-05-24 PROCEDURE — 82803 BLOOD GASES ANY COMBINATION: CPT

## 2025-05-24 PROCEDURE — 93005 ELECTROCARDIOGRAM TRACING: CPT | Performed by: STUDENT IN AN ORGANIZED HEALTH CARE EDUCATION/TRAINING PROGRAM

## 2025-05-24 PROCEDURE — 6360000004 HC RX CONTRAST MEDICATION: Performed by: PHYSICIAN ASSISTANT

## 2025-05-24 PROCEDURE — 96374 THER/PROPH/DIAG INJ IV PUSH: CPT

## 2025-05-24 PROCEDURE — 85025 COMPLETE CBC W/AUTO DIFF WBC: CPT

## 2025-05-24 PROCEDURE — 96375 TX/PRO/DX INJ NEW DRUG ADDON: CPT

## 2025-05-24 PROCEDURE — 85610 PROTHROMBIN TIME: CPT

## 2025-05-24 PROCEDURE — 85730 THROMBOPLASTIN TIME PARTIAL: CPT

## 2025-05-24 PROCEDURE — 83880 ASSAY OF NATRIURETIC PEPTIDE: CPT

## 2025-05-24 PROCEDURE — 71260 CT THORAX DX C+: CPT

## 2025-05-24 PROCEDURE — 6360000002 HC RX W HCPCS: Performed by: PHYSICIAN ASSISTANT

## 2025-05-24 PROCEDURE — 71045 X-RAY EXAM CHEST 1 VIEW: CPT

## 2025-05-24 PROCEDURE — 80053 COMPREHEN METABOLIC PANEL: CPT

## 2025-05-24 PROCEDURE — 99285 EMERGENCY DEPT VISIT HI MDM: CPT

## 2025-05-24 PROCEDURE — 84132 ASSAY OF SERUM POTASSIUM: CPT

## 2025-05-24 RX ORDER — IOPAMIDOL 755 MG/ML
75 INJECTION, SOLUTION INTRAVASCULAR
Status: COMPLETED | OUTPATIENT
Start: 2025-05-24 | End: 2025-05-24

## 2025-05-24 RX ORDER — ONDANSETRON 2 MG/ML
4 INJECTION INTRAMUSCULAR; INTRAVENOUS ONCE
Status: COMPLETED | OUTPATIENT
Start: 2025-05-24 | End: 2025-05-24

## 2025-05-24 RX ORDER — MORPHINE SULFATE 4 MG/ML
4 INJECTION, SOLUTION INTRAMUSCULAR; INTRAVENOUS ONCE
Refills: 0 | Status: COMPLETED | OUTPATIENT
Start: 2025-05-24 | End: 2025-05-24

## 2025-05-24 RX ADMIN — ONDANSETRON 4 MG: 2 INJECTION INTRAMUSCULAR; INTRAVENOUS at 23:38

## 2025-05-24 RX ADMIN — IOPAMIDOL 75 ML: 755 INJECTION, SOLUTION INTRAVENOUS at 23:55

## 2025-05-24 RX ADMIN — MORPHINE SULFATE 4 MG: 4 INJECTION INTRAVENOUS at 23:38

## 2025-05-24 ASSESSMENT — PAIN DESCRIPTION - DESCRIPTORS: DESCRIPTORS: SHARP;PRESSURE

## 2025-05-24 ASSESSMENT — PAIN DESCRIPTION - LOCATION: LOCATION: BACK;CHEST

## 2025-05-24 ASSESSMENT — PAIN DESCRIPTION - ORIENTATION: ORIENTATION: LEFT

## 2025-05-24 ASSESSMENT — PAIN SCALES - GENERAL
PAINLEVEL_OUTOF10: 7
PAINLEVEL_OUTOF10: 7

## 2025-05-24 ASSESSMENT — PAIN - FUNCTIONAL ASSESSMENT: PAIN_FUNCTIONAL_ASSESSMENT: 0-10

## 2025-05-25 VITALS
RESPIRATION RATE: 14 BRPM | OXYGEN SATURATION: 95 % | DIASTOLIC BLOOD PRESSURE: 75 MMHG | WEIGHT: 215.4 LBS | SYSTOLIC BLOOD PRESSURE: 116 MMHG | HEIGHT: 69 IN | TEMPERATURE: 97.8 F | HEART RATE: 62 BPM | BODY MASS INDEX: 31.9 KG/M2

## 2025-05-25 PROBLEM — D72.829 LEUKOCYTOSIS: Status: ACTIVE | Noted: 2025-05-25

## 2025-05-25 PROBLEM — I25.119 CORONARY ARTERY DISEASE INVOLVING NATIVE CORONARY ARTERY OF NATIVE HEART WITH ANGINA PECTORIS: Status: ACTIVE | Noted: 2025-05-14

## 2025-05-25 PROBLEM — R07.9 CHEST PAIN, UNSPECIFIED TYPE: Status: ACTIVE | Noted: 2025-05-25

## 2025-05-25 LAB
ANION GAP SERPL CALCULATED.3IONS-SCNC: 9 MMOL/L (ref 3–16)
BUN SERPL-MCNC: 6 MG/DL (ref 7–20)
CALCIUM SERPL-MCNC: 9.8 MG/DL (ref 8.3–10.6)
CHLORIDE SERPL-SCNC: 106 MMOL/L (ref 99–110)
CHOLEST SERPL-MCNC: 111 MG/DL (ref 0–199)
CO2 SERPL-SCNC: 27 MMOL/L (ref 21–32)
CREAT SERPL-MCNC: 0.9 MG/DL (ref 0.6–1.1)
DEPRECATED RDW RBC AUTO: 14.8 % (ref 12.4–15.4)
EKG ATRIAL RATE: 70 BPM
EKG DIAGNOSIS: NORMAL
EKG P AXIS: 59 DEGREES
EKG P-R INTERVAL: 152 MS
EKG Q-T INTERVAL: 406 MS
EKG QRS DURATION: 86 MS
EKG QTC CALCULATION (BAZETT): 438 MS
EKG R AXIS: 60 DEGREES
EKG T AXIS: 45 DEGREES
EKG VENTRICULAR RATE: 70 BPM
GFR SERPLBLD CREATININE-BSD FMLA CKD-EPI: 78 ML/MIN/{1.73_M2}
GLUCOSE SERPL-MCNC: 99 MG/DL (ref 70–99)
HCT VFR BLD AUTO: 34.6 % (ref 36–48)
HDLC SERPL-MCNC: 38 MG/DL (ref 40–60)
HGB BLD-MCNC: 12.1 G/DL (ref 12–16)
LDLC SERPL CALC-MCNC: 34 MG/DL
MCH RBC QN AUTO: 31.1 PG (ref 26–34)
MCHC RBC AUTO-ENTMCNC: 34.9 G/DL (ref 31–36)
MCV RBC AUTO: 89.1 FL (ref 80–100)
PLATELET # BLD AUTO: 285 K/UL (ref 135–450)
PMV BLD AUTO: 7.5 FL (ref 5–10.5)
POTASSIUM SERPL-SCNC: 3 MMOL/L (ref 3.5–5.1)
POTASSIUM SERPL-SCNC: 3.9 MMOL/L (ref 3.5–5.1)
RBC # BLD AUTO: 3.88 M/UL (ref 4–5.2)
SODIUM SERPL-SCNC: 142 MMOL/L (ref 136–145)
TRIGL SERPL-MCNC: 196 MG/DL (ref 0–150)
TROPONIN, HIGH SENSITIVITY: 14 NG/L (ref 0–14)
TROPONIN, HIGH SENSITIVITY: 7 NG/L (ref 0–14)
TROPONIN, HIGH SENSITIVITY: 8 NG/L (ref 0–14)
TSH SERPL DL<=0.005 MIU/L-ACNC: 2.78 UIU/ML (ref 0.27–4.2)
VLDLC SERPL CALC-MCNC: 39 MG/DL
WBC # BLD AUTO: 13.1 K/UL (ref 4–11)

## 2025-05-25 PROCEDURE — 6360000002 HC RX W HCPCS: Performed by: STUDENT IN AN ORGANIZED HEALTH CARE EDUCATION/TRAINING PROGRAM

## 2025-05-25 PROCEDURE — 99222 1ST HOSP IP/OBS MODERATE 55: CPT | Performed by: INTERNAL MEDICINE

## 2025-05-25 PROCEDURE — 36415 COLL VENOUS BLD VENIPUNCTURE: CPT

## 2025-05-25 PROCEDURE — 6370000000 HC RX 637 (ALT 250 FOR IP): Performed by: NURSE PRACTITIONER

## 2025-05-25 PROCEDURE — 84484 ASSAY OF TROPONIN QUANT: CPT

## 2025-05-25 PROCEDURE — 2500000003 HC RX 250 WO HCPCS: Performed by: NURSE PRACTITIONER

## 2025-05-25 PROCEDURE — G0378 HOSPITAL OBSERVATION PER HR: HCPCS

## 2025-05-25 PROCEDURE — 80061 LIPID PANEL: CPT

## 2025-05-25 PROCEDURE — 6370000000 HC RX 637 (ALT 250 FOR IP): Performed by: STUDENT IN AN ORGANIZED HEALTH CARE EDUCATION/TRAINING PROGRAM

## 2025-05-25 PROCEDURE — 80048 BASIC METABOLIC PNL TOTAL CA: CPT

## 2025-05-25 PROCEDURE — 93010 ELECTROCARDIOGRAM REPORT: CPT | Performed by: INTERNAL MEDICINE

## 2025-05-25 PROCEDURE — 2060000000 HC ICU INTERMEDIATE R&B

## 2025-05-25 PROCEDURE — 83036 HEMOGLOBIN GLYCOSYLATED A1C: CPT

## 2025-05-25 PROCEDURE — 85027 COMPLETE CBC AUTOMATED: CPT

## 2025-05-25 PROCEDURE — 6370000000 HC RX 637 (ALT 250 FOR IP): Performed by: PHYSICIAN ASSISTANT

## 2025-05-25 PROCEDURE — 84443 ASSAY THYROID STIM HORMONE: CPT

## 2025-05-25 PROCEDURE — 96375 TX/PRO/DX INJ NEW DRUG ADDON: CPT

## 2025-05-25 RX ORDER — SODIUM CHLORIDE 9 MG/ML
INJECTION, SOLUTION INTRAVENOUS PRN
Status: DISCONTINUED | OUTPATIENT
Start: 2025-05-25 | End: 2025-05-25 | Stop reason: HOSPADM

## 2025-05-25 RX ORDER — CARVEDILOL 25 MG/1
25 TABLET ORAL 2 TIMES DAILY WITH MEALS
Status: DISCONTINUED | OUTPATIENT
Start: 2025-05-25 | End: 2025-05-25 | Stop reason: HOSPADM

## 2025-05-25 RX ORDER — VALSARTAN 160 MG/1
320 TABLET ORAL DAILY
Status: DISCONTINUED | OUTPATIENT
Start: 2025-05-25 | End: 2025-05-25 | Stop reason: HOSPADM

## 2025-05-25 RX ORDER — POTASSIUM CHLORIDE 1500 MG/1
40 TABLET, EXTENDED RELEASE ORAL PRN
Status: DISCONTINUED | OUTPATIENT
Start: 2025-05-25 | End: 2025-05-25 | Stop reason: HOSPADM

## 2025-05-25 RX ORDER — MORPHINE SULFATE 4 MG/ML
4 INJECTION, SOLUTION INTRAMUSCULAR; INTRAVENOUS EVERY 4 HOURS PRN
Status: DISCONTINUED | OUTPATIENT
Start: 2025-05-25 | End: 2025-05-25 | Stop reason: HOSPADM

## 2025-05-25 RX ORDER — ACETAMINOPHEN 325 MG/1
650 TABLET ORAL EVERY 6 HOURS PRN
Status: DISCONTINUED | OUTPATIENT
Start: 2025-05-25 | End: 2025-05-25 | Stop reason: HOSPADM

## 2025-05-25 RX ORDER — MAGNESIUM SULFATE IN WATER 40 MG/ML
2000 INJECTION, SOLUTION INTRAVENOUS PRN
Status: DISCONTINUED | OUTPATIENT
Start: 2025-05-25 | End: 2025-05-25 | Stop reason: HOSPADM

## 2025-05-25 RX ORDER — FLUTICASONE PROPIONATE 50 MCG
2 SPRAY, SUSPENSION (ML) NASAL DAILY PRN
Status: DISCONTINUED | OUTPATIENT
Start: 2025-05-25 | End: 2025-05-25 | Stop reason: HOSPADM

## 2025-05-25 RX ORDER — SUMATRIPTAN SUCCINATE 25 MG/1
100 TABLET ORAL
Status: DISCONTINUED | OUTPATIENT
Start: 2025-05-25 | End: 2025-05-25 | Stop reason: HOSPADM

## 2025-05-25 RX ORDER — ONDANSETRON 4 MG/1
4 TABLET, ORALLY DISINTEGRATING ORAL EVERY 8 HOURS PRN
Status: DISCONTINUED | OUTPATIENT
Start: 2025-05-25 | End: 2025-05-25 | Stop reason: HOSPADM

## 2025-05-25 RX ORDER — POTASSIUM CHLORIDE 7.45 MG/ML
10 INJECTION INTRAVENOUS PRN
Status: DISCONTINUED | OUTPATIENT
Start: 2025-05-25 | End: 2025-05-25 | Stop reason: HOSPADM

## 2025-05-25 RX ORDER — FENTANYL CITRATE 50 UG/ML
25 INJECTION, SOLUTION INTRAMUSCULAR; INTRAVENOUS ONCE
Refills: 0 | Status: COMPLETED | OUTPATIENT
Start: 2025-05-25 | End: 2025-05-25

## 2025-05-25 RX ORDER — SODIUM CHLORIDE 0.9 % (FLUSH) 0.9 %
5-40 SYRINGE (ML) INJECTION PRN
Status: DISCONTINUED | OUTPATIENT
Start: 2025-05-25 | End: 2025-05-25 | Stop reason: HOSPADM

## 2025-05-25 RX ORDER — POTASSIUM CHLORIDE 1500 MG/1
10 TABLET, EXTENDED RELEASE ORAL DAILY
Status: DISCONTINUED | OUTPATIENT
Start: 2025-05-26 | End: 2025-05-25 | Stop reason: HOSPADM

## 2025-05-25 RX ORDER — ALBUTEROL SULFATE 90 UG/1
2 INHALANT RESPIRATORY (INHALATION) 4 TIMES DAILY PRN
Status: DISCONTINUED | OUTPATIENT
Start: 2025-05-25 | End: 2025-05-25 | Stop reason: HOSPADM

## 2025-05-25 RX ORDER — POLYETHYLENE GLYCOL 3350 17 G/17G
17 POWDER, FOR SOLUTION ORAL DAILY PRN
Status: DISCONTINUED | OUTPATIENT
Start: 2025-05-25 | End: 2025-05-25 | Stop reason: HOSPADM

## 2025-05-25 RX ORDER — NICOTINE 21 MG/24HR
1 PATCH, TRANSDERMAL 24 HOURS TRANSDERMAL DAILY
Status: DISCONTINUED | OUTPATIENT
Start: 2025-05-25 | End: 2025-05-25 | Stop reason: HOSPADM

## 2025-05-25 RX ORDER — VENLAFAXINE HYDROCHLORIDE 150 MG/1
150 CAPSULE, EXTENDED RELEASE ORAL
Status: DISCONTINUED | OUTPATIENT
Start: 2025-05-25 | End: 2025-05-25 | Stop reason: HOSPADM

## 2025-05-25 RX ORDER — PANTOPRAZOLE SODIUM 40 MG/1
40 TABLET, DELAYED RELEASE ORAL
Status: DISCONTINUED | OUTPATIENT
Start: 2025-05-25 | End: 2025-05-25 | Stop reason: HOSPADM

## 2025-05-25 RX ORDER — CETIRIZINE HYDROCHLORIDE 10 MG/1
10 TABLET ORAL DAILY
Status: DISCONTINUED | OUTPATIENT
Start: 2025-05-25 | End: 2025-05-25 | Stop reason: HOSPADM

## 2025-05-25 RX ORDER — ROSUVASTATIN CALCIUM 20 MG/1
40 TABLET, COATED ORAL NIGHTLY
Status: DISCONTINUED | OUTPATIENT
Start: 2025-05-25 | End: 2025-05-25 | Stop reason: HOSPADM

## 2025-05-25 RX ORDER — ENOXAPARIN SODIUM 100 MG/ML
40 INJECTION SUBCUTANEOUS DAILY
Status: DISCONTINUED | OUTPATIENT
Start: 2025-05-25 | End: 2025-05-25 | Stop reason: HOSPADM

## 2025-05-25 RX ORDER — MORPHINE SULFATE 2 MG/ML
2 INJECTION, SOLUTION INTRAMUSCULAR; INTRAVENOUS EVERY 4 HOURS PRN
Status: DISCONTINUED | OUTPATIENT
Start: 2025-05-25 | End: 2025-05-25 | Stop reason: HOSPADM

## 2025-05-25 RX ORDER — ASPIRIN 81 MG/1
81 TABLET ORAL DAILY
Status: DISCONTINUED | OUTPATIENT
Start: 2025-05-25 | End: 2025-05-25 | Stop reason: HOSPADM

## 2025-05-25 RX ORDER — IPRATROPIUM BROMIDE 42 UG/1
2 SPRAY, METERED NASAL 4 TIMES DAILY PRN
Status: DISCONTINUED | OUTPATIENT
Start: 2025-05-25 | End: 2025-05-25 | Stop reason: HOSPADM

## 2025-05-25 RX ORDER — POTASSIUM CHLORIDE 1500 MG/1
40 TABLET, EXTENDED RELEASE ORAL ONCE
Status: COMPLETED | OUTPATIENT
Start: 2025-05-25 | End: 2025-05-25

## 2025-05-25 RX ORDER — ASPIRIN 81 MG/1
324 TABLET, CHEWABLE ORAL ONCE
Status: COMPLETED | OUTPATIENT
Start: 2025-05-25 | End: 2025-05-25

## 2025-05-25 RX ORDER — ONDANSETRON 2 MG/ML
4 INJECTION INTRAMUSCULAR; INTRAVENOUS EVERY 6 HOURS PRN
Status: DISCONTINUED | OUTPATIENT
Start: 2025-05-25 | End: 2025-05-25 | Stop reason: HOSPADM

## 2025-05-25 RX ORDER — ACETAMINOPHEN 650 MG/1
650 SUPPOSITORY RECTAL EVERY 6 HOURS PRN
Status: DISCONTINUED | OUTPATIENT
Start: 2025-05-25 | End: 2025-05-25 | Stop reason: HOSPADM

## 2025-05-25 RX ORDER — SODIUM CHLORIDE 0.9 % (FLUSH) 0.9 %
5-40 SYRINGE (ML) INJECTION EVERY 12 HOURS SCHEDULED
Status: DISCONTINUED | OUTPATIENT
Start: 2025-05-25 | End: 2025-05-25 | Stop reason: HOSPADM

## 2025-05-25 RX ADMIN — FENTANYL CITRATE 25 MCG: 50 INJECTION INTRAMUSCULAR; INTRAVENOUS at 01:48

## 2025-05-25 RX ADMIN — VALSARTAN 320 MG: 160 TABLET ORAL at 09:03

## 2025-05-25 RX ADMIN — SODIUM CHLORIDE, PRESERVATIVE FREE 10 ML: 5 INJECTION INTRAVENOUS at 09:04

## 2025-05-25 RX ADMIN — CETIRIZINE HYDROCHLORIDE 10 MG: 10 TABLET, FILM COATED ORAL at 09:03

## 2025-05-25 RX ADMIN — ASPIRIN 324 MG: 81 TABLET, CHEWABLE ORAL at 00:28

## 2025-05-25 RX ADMIN — ASPIRIN 81 MG: 81 TABLET, COATED ORAL at 09:03

## 2025-05-25 RX ADMIN — NITROGLYCERIN 1 INCH: 20 OINTMENT TOPICAL at 00:27

## 2025-05-25 RX ADMIN — PANTOPRAZOLE SODIUM 40 MG: 40 TABLET, DELAYED RELEASE ORAL at 05:48

## 2025-05-25 RX ADMIN — CARVEDILOL 25 MG: 25 TABLET, FILM COATED ORAL at 09:03

## 2025-05-25 RX ADMIN — ACETAMINOPHEN 650 MG: 325 TABLET ORAL at 05:51

## 2025-05-25 RX ADMIN — BREXPIPRAZOLE 0.5 MG: 0.5 TABLET ORAL at 09:07

## 2025-05-25 RX ADMIN — POTASSIUM CHLORIDE 40 MEQ: 1500 TABLET, EXTENDED RELEASE ORAL at 00:28

## 2025-05-25 ASSESSMENT — PAIN DESCRIPTION - ORIENTATION
ORIENTATION: LEFT
ORIENTATION: LEFT

## 2025-05-25 ASSESSMENT — PAIN SCALES - GENERAL
PAINLEVEL_OUTOF10: 2
PAINLEVEL_OUTOF10: 7
PAINLEVEL_OUTOF10: 5
PAINLEVEL_OUTOF10: 6
PAINLEVEL_OUTOF10: 3

## 2025-05-25 ASSESSMENT — PAIN DESCRIPTION - FREQUENCY: FREQUENCY: CONTINUOUS

## 2025-05-25 ASSESSMENT — PAIN DESCRIPTION - DESCRIPTORS
DESCRIPTORS: PRESSURE
DESCRIPTORS: PRESSURE

## 2025-05-25 ASSESSMENT — PAIN DESCRIPTION - ONSET: ONSET: ON-GOING

## 2025-05-25 ASSESSMENT — PAIN - FUNCTIONAL ASSESSMENT
PAIN_FUNCTIONAL_ASSESSMENT: ACTIVITIES ARE NOT PREVENTED
PAIN_FUNCTIONAL_ASSESSMENT: ACTIVITIES ARE NOT PREVENTED

## 2025-05-25 ASSESSMENT — PAIN DESCRIPTION - LOCATION
LOCATION: CHEST
LOCATION: CHEST;BACK

## 2025-05-25 ASSESSMENT — HEART SCORE: ECG: NORMAL

## 2025-05-25 ASSESSMENT — PAIN DESCRIPTION - DIRECTION: RADIATING_TOWARDS: BACK

## 2025-05-25 NOTE — PLAN OF CARE
Problem: Chronic Conditions and Co-morbidities  Goal: Patient's chronic conditions and co-morbidity symptoms are monitored and maintained or improved  5/25/2025 1314 by Gabriel Kunz RN  Outcome: Completed  5/25/2025 0916 by Gabriel Kunz RN  Outcome: Progressing  Flowsheets (Taken 5/25/2025 0315 by Chantel Mercer, RN)  Care Plan - Patient's Chronic Conditions and Co-Morbidity Symptoms are Monitored and Maintained or Improved:   Monitor and assess patient's chronic conditions and comorbid symptoms for stability, deterioration, or improvement   Collaborate with multidisciplinary team to address chronic and comorbid conditions and prevent exacerbation or deterioration   Update acute care plan with appropriate goals if chronic or comorbid symptoms are exacerbated and prevent overall improvement and discharge     Problem: Discharge Planning  Goal: Discharge to home or other facility with appropriate resources  5/25/2025 1314 by Gabriel Kunz RN  Outcome: Completed  5/25/2025 0916 by Gabriel Kunz RN  Outcome: Progressing  Flowsheets (Taken 5/25/2025 0315 by Chantel Mercer, RN)  Discharge to home or other facility with appropriate resources:   Identify barriers to discharge with patient and caregiver   Arrange for needed discharge resources and transportation as appropriate   Identify discharge learning needs (meds, wound care, etc)   Refer to discharge planning if patient needs post-hospital services based on physician order or complex needs related to functional status, cognitive ability or social support system     Problem: Pain  Goal: Verbalizes/displays adequate comfort level or baseline comfort level  5/25/2025 1314 by Gabriel Kunz RN  Outcome: Completed  5/25/2025 0916 by Gabriel Kunz RN  Outcome: Progressing  Flowsheets (Taken 5/25/2025 0315 by Chantel Mercer, RN)  Verbalizes/displays adequate comfort level or baseline comfort level:   Encourage patient to monitor pain and

## 2025-05-25 NOTE — PROGRESS NOTES
New admission for chest pain without origin, a/o x 4, meds whole with thin liquids, long cardiac history, pain meds x 1, bedrest ordered and pt compliant but otherwise would be able to be independent.      Pt currently in bed resting comfortably, eyes closed, no s/s of distress noted, call light within reach. A/o x 4, compliant with call light to go to bathroom. Tolerated all care well.  Pain has improved after pain medication.

## 2025-05-25 NOTE — CONSULTS
[facility]   Cardiology Consultation   Date: 5/25/2025  Reason for Consultation: Back and chest pain  Consult Requesting Physician: Enrique Camp MD     Chief Complaint   Patient presents with    Chest Pain     Starting this evening. L sided     Back Pain     HPI: Brittany Mccall is a 49 y.o. female with history of CAD, DVT, hypertension, CVA, status post ASD/PFO closure was sitting in her chair when she started having pain in the back that radiated along her rib to the lower part of her chest wall below her breast.  This was sharp.  EKG was unremarkable without any significant change from before.  Troponin has been negative.  She had coronary angiography in December with IVUS which showed nonobstructive CAD of mainly LAD.  She has a follow-up with cardiology in June.  She is still has some discomfort in that area without any pain.  She has tenderness over the rib.      Past Medical History:   Diagnosis Date    Anxiety     Cerebral artery occlusion with cerebral infarction (HCC) 11/28/23    DVT (deep vein thrombosis) in pregnancy     left leg in small vessel    Hypertension     Low blood potassium     5/2015 patient states had recent critically low kt        Past Surgical History:   Procedure Laterality Date    ABDOMINAL EXPLORATION SURGERY  8/12/15    with repair of vagina     CARDIAC PROCEDURE N/A 7/3/2024    Patent foramen ovale closure performed by Iam Olivares MD at [facility] CARDIAC CATH LAB    CARDIAC PROCEDURE N/A 12/9/2024    Left heart cath / coronary angiography performed by Dann Cortes MD at [facility] CARDIAC CATH LAB    CARDIAC PROCEDURE N/A 12/9/2024    Fractional flow reserve (FFR) performed by Dann Cortes MD at [facility] CARDIAC CATH LAB    DILATION AND CURETTAGE OF UTERUS      EP DEVICE PROCEDURE N/A 5/14/2024    ABLATION PVC performed by Jann De La Garza MD at [facility] CARDIAC CATH LAB    HYSTERECTOMY (CERVIX STATUS UNKNOWN)  5/28/15    total laparoscopic with LEFT S&O    OVARY REMOVAL

## 2025-05-25 NOTE — ED PROVIDER NOTES
[facility] EMERGENCY DEPARTMENT  EMERGENCY DEPARTMENT ENCOUNTER        Pt Name: Brittany Mccall  MRN: [id number]  Birthdate 1975  Date of evaluation: 5/24/2025  Provider: Douglas Arteaga PA-C  PCP: Kamran Saleh APRN - CNP  Note Started: 10:32 PM EDT 5/24/25       I have seen and evaluated this patient with my supervising physician Alfonzo Anderson DO.      CHIEF COMPLAINT       Chief Complaint   Patient presents with    Chest Pain     Starting this evening. L sided     Back Pain       HISTORY OF PRESENT ILLNESS: 1 or more Elements     History From: Patient     Limitations to history : None    Social Determinants Significantly Affecting Health : None    Chief Complaint: Chest pain    Brittany Mccall is a 49 y.o. female with a history of hypertension, hyperlipidemia, CAD, current tobacco abuse, CVA and DVT who presents to the emergency department today complaining of chest pain that began around 8 PM this evening.  Patient states the pain is mostly a pressure sensation but is sometimes sharp.  This localizes to her left chest and radiates to the left side of her back.  She states the pain is intermittent and she rates the pain a 7/10.  Patient does report shortness of breath with this.  She denies heart palpitations, diaphoresis, lightheadedness or dizziness.  She denies fevers, chills, coughing or recent illness.  She has no GI complaints.        Nursing Notes were all reviewed and agreed with or any disagreements were addressed in the HPI.    REVIEW OF SYSTEMS :      Review of Systems   Constitutional:  Negative for chills and fever.   Respiratory:  Positive for shortness of breath. Negative for cough and chest tightness.    Cardiovascular:  Positive for chest pain. Negative for palpitations and leg swelling.   Gastrointestinal:  Negative for abdominal pain, diarrhea, nausea and vomiting.   Genitourinary:  Negative for difficulty urinating, dysuria, flank pain, frequency, hematuria and

## 2025-05-25 NOTE — ED NOTES
Patient Name: Brittany Mccall  : 1975 49 y.o.  MRN: [id number]  ED Room #: ED-0020/20     Chief complaint:   Chief Complaint   Patient presents with    Chest Pain     Starting this evening. L sided     Back Pain     Hospital Problem/Diagnosis:   Hospital Problems           Last Modified POA    * (Principal) Chest pain, unspecified type 2025 Yes         O2 Flow Rate:O2 Device: None (Room air)   (if applicable)  Cardiac Rhythm:   (if applicable)  Active LDA's:   Peripheral IV 25 Right Antecubital (Active)            How does patient ambulate? Low Fall Risk (Ambulates by themselves without support    2. How does patient take pills? Whole with Water    3. Is patient alert? Alert    4. Is patient oriented? To Person, To Place, To Time, To Situation, and Follows Commands    5.   Patient arrived from:  home  Facility Name: ___________________________________________    6. If patient is disoriented or from a Skill Nursing Facility has family been notified of admission?  N/a    7. Patient belongings? Belongings: Cell Phone and Clothing    Disposition of belongings? Kept with Patient     8. Any specific patient or family belongings/needs/dynamics?   a. N/a    9. Miscellaneous comments/pending orders?  a. N/a      If there are any additional questions please reach out to the Emergency Department.

## 2025-05-25 NOTE — PROGRESS NOTES
Data- discharge order received, pt verbalized agreement to discharge, disposition to previous residence, no needs for HHC/DME.     Action- discharge instructions prepared and given to pt, pt verbalized understanding. Medication information packet given r/t NEW and/or CHANGED prescriptions emphasizing name/purpose/side effects, pt verbalized understanding. Discharge instruction summary: Diet- general, Activity- as tolerated, Primary Care Physician as follows: Kamran Saleh APRN - [phone] f/u appointment in 1 week, no new prescription medications filled.    1. WEIGHT: Admit Weight - Scale: 97.1 kg (214 lb) (05/24/25 2213)        Today  Weight - Scale: 97.7 kg (215 lb 6.4 oz) (05/25/25 0551)       2. O2 SAT.: SpO2: 95 % (05/25/25 1238)    Response- Pt belongings gathered, IV removed. Disposition is home (no HHC/DME needs), transported with , taken to lobby via w/c w/ RN, no complications.

## 2025-05-25 NOTE — ED PROVIDER NOTES
EMERGENCY DEPARTMENT PROVIDER NOTE         PATIENT IDENTIFICATION     Name:   Brittany Mccall  MRN:   [id number]  YOB: 1975  Date of Evaluation:   2025  Provider:   TRINA Adrian; Alfonzo Anderson DO  PCP:   Kamran Saleh APRN - CNP        CHIEF COMPLAINT       Chest Pain (Starting this evening. L sided ) and Back Pain        HISTORY OF PRESENT ILLNESS     I independently interviewed patient and/or caretaker(s).  See Advanced Practice Provider (CONSUELO) note for full HPI.  In summary, Brittany Mccall  is a(n) 49 y.o. female who presents with left-sided chest pain that started at about 2000 this evening.  Described as a sharp/pressure sensation.  Does have a history of CAD in the past.        PHYSICAL EXAM     I reviewed physical exam performed and documented by CONSUELO.  I performed an independent physical examination with findings as follows:  Uncomfortable but nontoxic-appearing female with no respiratory distress on room air.  Normal heart sounds with 2+ pulses in all distal extremities.  No edema.        EKG INTERPRETATION     TIME:     RATE:   70 bpm  WA INTERVAL:   152 ms  QRS DURATION:   86 ms  QT/QTc:   406/438 ms  RHYTHM:   Normal sinus  AXIS:   Regular  ABNORMALITIES  No STEMI  No ischemic changes    PRIOR EK/9/24- current EKG without significant changes when compared to prior    INTERPRETATION:  Normal EKG    REVIEWED BY:   Alfonzo Anderson Jr., DO    EKG was independently reviewed by emergency department physician in absence of cardiologist.        LAB RESULTS     Results for orders placed or performed during the hospital encounter of 25   CBC with Auto Differential   Result Value Ref Range    WBC 15.2 (H) 4.0 - 11.0 K/uL    RBC 4.18 4.00 - 5.20 M/uL    Hemoglobin 13.2 12.0 - 16.0 g/dL    Hematocrit 37.1 36.0 - 48.0 %    MCV 88.7 80.0 - 100.0 fL    MCH 31.5 26.0 - 34.0 pg    MCHC 35.5 31.0 - 36.0 g/dL    RDW 14.7 12.4 - 15.4 %    Platelets 320 135 - 450 K/uL

## 2025-05-25 NOTE — DISCHARGE SUMMARY
General: Alert, Awake, Cooperative. No distress.   HEENT: Oral mucosa moist.   Neck: No JVD   Respiratory: BS clear bilaterally. No rales/wheezes.   Cardiovascular: S1 S2, RRR  Gastrointestinal: BS+. Soft, No distention.  Musculoskeletal/ Extermities: No edema legs bilaterally  Neurologic: Face symmetrical, speech clear.Grossly non focal  Psych: no psychomotor agitation or depression    Discharge Instructions:   Diet: cardiac diet   Activity: As tolerated  Advised medication compliance and regular follow up as outpt.  Condition on discharge: Stable  Disposition: Discharged to:   [x]Home, [][facility], []SNF, []Acute Rehab, []Hospice     Discharge Medications:        Medication List        CHANGE how you take these medications      cetirizine 10 MG tablet  Commonly known as: EQ Allergy Relief (Cetirizine)  Take 1 tablet by mouth once daily  What changed:   how much to take  how to take this  when to take this     esomeprazole 40 MG delayed release capsule  Commonly known as: NEXIUM  TAKE 1 CAPSULE BY MOUTH ONCE DAILY IN THE MORNING BEFORE BREAKFAST  What changed:   how much to take  when to take this     fluticasone 50 MCG/ACT nasal spray  Commonly known as: FLONASE  Use 2 spray(s) in each nostril once daily  What changed: See the new instructions.            CONTINUE taking these medications      acetaminophen 325 MG tablet  Commonly known as: TYLENOL  Take 2 tablets by mouth every 6 hours as needed for Pain     albuterol sulfate  (90 Base) MCG/ACT inhaler  Commonly known as: Ventolin HFA  Inhale 2 puffs into the lungs 4 times daily as needed for Wheezing     aspirin 81 MG EC tablet  Take 1 tablet by mouth daily     brexpiprazole 0.5 MG Tabs tablet  Commonly known as: REXULTI     carvedilol 25 MG tablet  Commonly known as: COREG  Take 1 tablet by mouth 2 times daily     clonazePAM 0.5 MG tablet  Commonly known as: KLONOPIN  Take 1 tablet by mouth 2 times daily as needed for Anxiety for up to 7 days. Max Daily  pulmonary embolism.  Main pulmonary artery is normal in caliber. Mediastinum: No evidence of mediastinal lymphadenopathy.  The heart and pericardium demonstrate no acute abnormality.  There is no acute abnormality of the thoracic aorta. Lungs/pleura: Scattered subsegmental atelectasis.  No focal consolidation or pulmonary edema.  Mild bilateral lower lobe bronchiectasis.  No pleural effusion or pneumothorax. Upper Abdomen: Limited images of the upper abdomen are unremarkable. Soft Tissues/Bones: No acute bone or soft tissue abnormality.     No evidence of pulmonary embolism or acute pulmonary abnormality.     XR CHEST PORTABLE  Result Date: 5/25/2025  EXAMINATION: ONE XRAY VIEW OF THE CHEST 5/24/2025 10:53 pm COMPARISON: November 18, 2024 HISTORY: ORDERING SYSTEM PROVIDED HISTORY: SOB TECHNOLOGIST PROVIDED HISTORY: Reason for exam:->SOB Reason for Exam: SOB FINDINGS: No lines or tubes. Normal cardiomediastinal silhouette.  The lungs are clear without focal consolidation or pleural effusion.  No suspicious pulmonary nodules.  No pulmonary edema. No pneumothorax. No acute osseous abnormality.     No acute cardiopulmonary process.       PRIOR TO ADMISSION PROBLEM LIST:  Patient Active Problem List   Diagnosis    Otitis externa    PVD (peripheral vascular disease)    Thromboangiitis    Benign essential HTN    Tobacco abuse    Plantar fasciitis    Depression    Tendonitis of ankle    Anxiety disorder    Mild single current episode of major depressive disorder    Vitamin D deficiency    Pure hypercholesterolemia    Major depressive disorder with single episode, in remission    Hypokalemia    Intractable chronic migraine without aura and without status migrainosus    Chest pain    Major depressive disorder, recurrent, moderate (HCC)    RVOT ventricular tachycardia (HCC)    PVC (premature ventricular contraction)    H/O ischemic left PCA stroke    PAT (paroxysmal atrial tachycardia)    Remote history of stroke    Factor V

## 2025-05-25 NOTE — H&P
V2.0  History and Physical      Name:  Brittany Mccall /Age/Sex: 1975  (49 y.o. female)   MRN & CSN:  [id number] & [id number] Encounter Date/Time: 2025 2:26 AM EDT   Location:  [facility] PCP: Kamran Saleh APRN - CNP       Hospital Day: 2    Assessment and Plan:   Brittany Mccall is a 49 y.o. female with a pmh of patent foramina ovale, presence of ASD device, off Eliquis, CAD, CVA, depression, anxiety, heavy smoker.  She presents with left chest wall pain radiating through her left back.  Initial troponin of 6 and subsequent trended up to 14.  EKG without noted acute ischemia.  She has been admitted for further workup and management.      Plan:  # Chest pain.  Known history of CAD.  Currently smokes 2 pack/day cigarette for 15 years.  -CAD noted on her last cath on 24 and medical management was recommended.  - Chest x-ray without acute findings.  CT chest without PE.  EKG without acute ischemia.  Initial troponin of 6 and subsequent trended up to 14.  Chest pain relieved with fentanyl, morphine and full dose aspirin given in ED.    - Admit to stepdown telemetry.  - Consult cardiology.  - Pending repeat troponin.  - EF of 55 to 60% from NOEMI on 25.  Will defer to cardiology to decide on echo needed.  - Home Coreg, aspirin and rosuvastatin resumed.  - Nitro as needed.  - Pain control.    # Hypokalemia.  Potassium of 3.0.  Replace and repeat labs in AM.    # Leukocytosis.  WBC of 15.2.  She is afebrile, no tachycardia, or hypotension.  Chest x-ray and CT chest without acute findings.  Pending urinalysis.  Will monitor.    # PFO with presence of an ASD with device.  # History of SVT status post ablation.  - She states that she has been taken off Eliquis by her cardiologist about 6 months ago.    # History of CVA-continue aspirin and statin.    # Depression/anxiety-continue home meds.    # Current smoker.  Reports smoking 2 packs cigarettes a day for 15 years.  Patient counseled on the need to  LABURIN  06/29/2024 09:08 AM     <50,000 CFU/ml mixed skin/urogenital nathan. No further workup     Blood Cultures: No results found for: \"BC\"  No results found for: \"BLOODCULT2\"  Organism:   Lab Results   Component Value Date/Time    ORG Staphylococcus catherineunensis 08/27/2021 09:13 PM       Imaging/Diagnostics Last 24 Hours   CT CHEST PULMONARY EMBOLISM W CONTRAST  Result Date: 5/25/2025  EXAMINATION: CTA OF THE CHEST 5/24/2025 11:31 pm TECHNIQUE: CTA of the chest was performed after the administration of intravenous contrast.  Multiplanar reformatted images are provided for review.  MIP images are provided for review. Automated exposure control, iterative reconstruction, and/or weight based adjustment of the mA/kV was utilized to reduce the radiation dose to as low as reasonably achievable. COMPARISON: 11/21/2024 CTA heart HISTORY: ORDERING SYSTEM PROVIDED HISTORY: CP. SOB. HX of clotting TECHNOLOGIST PROVIDED HISTORY: Reason for exam:->CP. SOB. HX of clotting Additional Contrast?->1 Reason for Exam: CP. SOB. HX of clotting Relevant Medical/Surgical History: Chest Pain (Starting this evening. L sided )Back Pain FINDINGS: Pulmonary Arteries: No evidence of intraluminal filling defect to suggest pulmonary embolism.  Main pulmonary artery is normal in caliber. Mediastinum: No evidence of mediastinal lymphadenopathy.  The heart and pericardium demonstrate no acute abnormality.  There is no acute abnormality of the thoracic aorta. Lungs/pleura: Scattered subsegmental atelectasis.  No focal consolidation or pulmonary edema.  Mild bilateral lower lobe bronchiectasis.  No pleural effusion or pneumothorax. Upper Abdomen: Limited images of the upper abdomen are unremarkable. Soft Tissues/Bones: No acute bone or soft tissue abnormality.     No evidence of pulmonary embolism or acute pulmonary abnormality.     XR CHEST PORTABLE  Result Date: 5/25/2025  EXAMINATION: ONE XRAY VIEW OF THE CHEST 5/24/2025 10:53 pm COMPARISON:

## 2025-05-25 NOTE — PLAN OF CARE
Problem: Chronic Conditions and Co-morbidities  Goal: Patient's chronic conditions and co-morbidity symptoms are monitored and maintained or improved  Outcome: Progressing  Flowsheets (Taken 5/25/2025 0315 by Chantel Mercer, RN)  Care Plan - Patient's Chronic Conditions and Co-Morbidity Symptoms are Monitored and Maintained or Improved:   Monitor and assess patient's chronic conditions and comorbid symptoms for stability, deterioration, or improvement   Collaborate with multidisciplinary team to address chronic and comorbid conditions and prevent exacerbation or deterioration   Update acute care plan with appropriate goals if chronic or comorbid symptoms are exacerbated and prevent overall improvement and discharge     Problem: Discharge Planning  Goal: Discharge to home or other facility with appropriate resources  Outcome: Progressing  Flowsheets (Taken 5/25/2025 0315 by Chantel Mercer, RN)  Discharge to home or other facility with appropriate resources:   Identify barriers to discharge with patient and caregiver   Arrange for needed discharge resources and transportation as appropriate   Identify discharge learning needs (meds, wound care, etc)   Refer to discharge planning if patient needs post-hospital services based on physician order or complex needs related to functional status, cognitive ability or social support system     Problem: Pain  Goal: Verbalizes/displays adequate comfort level or baseline comfort level  Outcome: Progressing  Flowsheets (Taken 5/25/2025 0315 by Chantel Mercer, RN)  Verbalizes/displays adequate comfort level or baseline comfort level:   Encourage patient to monitor pain and request assistance   Assess pain using appropriate pain scale   Administer analgesics based on type and severity of pain and evaluate response   Implement non-pharmacological measures as appropriate and evaluate response   Consider cultural and social influences on pain and pain management   Notify

## 2025-05-26 LAB
EST. AVERAGE GLUCOSE BLD GHB EST-MCNC: 102.5 MG/DL
HBA1C MFR BLD: 5.2 %

## 2025-05-27 ENCOUNTER — CARE COORDINATION (OUTPATIENT)
Dept: CASE MANAGEMENT | Age: 50
End: 2025-05-27

## 2025-05-27 DIAGNOSIS — R07.9 CHEST PAIN, UNSPECIFIED TYPE: Primary | ICD-10-CM

## 2025-05-27 PROCEDURE — 1111F DSCHRG MED/CURRENT MED MERGE: CPT | Performed by: NURSE PRACTITIONER

## 2025-05-27 NOTE — TELEPHONE ENCOUNTER
CALLED AND SPOKE TO PATIENT AND SCHEDULED HER WITH TR PER HER REQUEST ON 6/4/25 AT 4:20PM, SHE NEEDED AN AFTERNOON APPT FOR A RIDE AND NO HOSPITAL FOLLOW UP SPOTS AVAILABLE WITH TR THIS WEEK OR EVEN NEXT WEEK. SC

## 2025-05-27 NOTE — TELEPHONE ENCOUNTER
Pt called to ask if she is still to get the PFO Closure Transthoracic Echo.  Please call to discuss her concerns.  Thank you

## 2025-05-27 NOTE — CARE COORDINATION
Care Transitions Note    Initial Call - Call within 2 business days of discharge: Yes    Patient Current Location:  Home: [address] [place] [address]    Care Transition Nurse contacted the patient by telephone to perform post hospital discharge assessment, verified name and  as identifiers.  Provided introduction to self, and explanation of the Care Transition Nurse role.    Patient: Brittany Mccall    Patient : 1975   MRN: [id number]    Reason for Admission: CP  Discharge Date: 25  RURS: Readmission Risk Score: 8.2      Last Discharge Facility       Date Complaint Diagnosis Description Type Department Provider    25 Chest Pain; Back Pain Chest pain, unspecified type ED to Hosp-Admission (Discharged) (ADMITTED) [facility] Enrique Camp MD; Chester Anderson...            Was this an external facility discharge? No    Additional needs identified to be addressed with provider   Standard priority: Pt needs hospital F/U appt, DC 2025 with CP.              Method of communication with provider: chart routing.    Patients top risk factors for readmission: medical condition-cp    Interventions to address risk factors:   Education: when to seek care  Review of patient management of conditions/medications:    Referrals: provided pulmonary referral number that cardio had instructed to call     Care Summary Note:   Pt concerned about her health. She was informed that her CP is probably related to pulmonary issues and is to make an appt. Pt did not have number for referral, CTN able to find and provide. She will call PCP office to schedule. CTN routed call to them also.    Pt had one episode of CP  with some SOB yesterday and resolved. Educated when to seek help.    No issues with eating/drinking or bowel and bladder. She is concerned about appt next week and will call cardio to see if she is to still to have procedure \"to close the hole because Dr. Ewing told me that it was closed.      Care Transition

## 2025-05-29 NOTE — TELEPHONE ENCOUNTER
Pt called to ask since her closure is about closed should she still get the Echo which is scheduled on 06/05 next Thursday.  Please call to inform the Pt.  Thank you

## 2025-05-30 ENCOUNTER — HOSPITAL ENCOUNTER (OUTPATIENT)
Dept: PULMONOLOGY | Age: 50
Discharge: HOME | End: 2025-05-30
Attending: INTERNAL MEDICINE
Payer: MEDICARE

## 2025-05-30 VITALS — HEART RATE: 64 BPM | RESPIRATION RATE: 18 BRPM | OXYGEN SATURATION: 95 %

## 2025-05-30 DIAGNOSIS — R06.09 DOE (DYSPNEA ON EXERTION): ICD-10-CM

## 2025-05-30 LAB
DLCO %PRED: 66 %
DLCO PRED: NORMAL
DLCO/VA %PRED: NORMAL
DLCO/VA PRED: NORMAL
DLCO/VA: NORMAL
DLCO: NORMAL
EXPIRATORY TIME-POST: NORMAL
EXPIRATORY TIME: NORMAL
FEF 25-75 %CHNG: NORMAL
FEF 25-75 POST %PRED: NORMAL
FEF 25-75% %PRED-PRE: NORMAL
FEF 25-75% PRED: NORMAL
FEF 25-75-POST: NORMAL
FEF 25-75-PRE: NORMAL
FEV1 %PRED-POST: NORMAL
FEV1 %PRED-PRE: 85 %
FEV1 PRED: NORMAL
FEV1-POST: NORMAL
FEV1-PRE: NORMAL
FEV1/FVC %PRED-POST: NORMAL
FEV1/FVC %PRED-PRE: NORMAL
FEV1/FVC PRED: NORMAL
FEV1/FVC-POST: NORMAL
FEV1/FVC-PRE: 65 %
FVC %PRED-POST: NORMAL
FVC %PRED-PRE: NORMAL
FVC PRED: NORMAL
FVC-POST: NORMAL
FVC-PRE: NORMAL
GAW %PRED: NORMAL
GAW PRED: NORMAL
GAW: NORMAL
IC PRE %PRED: NORMAL
IC PRED: NORMAL
IC: NORMAL
MEP: NORMAL
MIP: NORMAL
MVV %PRED-PRE: NORMAL
MVV PRED: NORMAL
MVV-PRE: NORMAL
PEF %PRED-POST: NORMAL
PEF %PRED-PRE: NORMAL
PEF PRED: NORMAL
PEF%CHNG: NORMAL
PEF-POST: NORMAL
PEF-PRE: NORMAL
RAW %PRED: NORMAL
RAW PRED: NORMAL
RAW: NORMAL
RV PRE %PRED: NORMAL
RV PRED: NORMAL
RV: NORMAL
SVC %PRED: NORMAL
SVC PRED: NORMAL
SVC: NORMAL
TLC PRE %PRED: 126 %
TLC PRED: NORMAL
TLC: NORMAL
VA %PRED: NORMAL
VA PRED: NORMAL
VA: NORMAL
VTG %PRED: NORMAL
VTG PRED: NORMAL
VTG: NORMAL

## 2025-05-30 PROCEDURE — 94760 N-INVAS EAR/PLS OXIMETRY 1: CPT

## 2025-05-30 PROCEDURE — 94726 PLETHYSMOGRAPHY LUNG VOLUMES: CPT

## 2025-05-30 PROCEDURE — 94729 DIFFUSING CAPACITY: CPT

## 2025-05-30 PROCEDURE — 94010 BREATHING CAPACITY TEST: CPT

## 2025-05-30 RX ORDER — ALBUTEROL SULFATE 90 UG/1
4 INHALANT RESPIRATORY (INHALATION) ONCE
Status: CANCELLED | OUTPATIENT
Start: 2025-05-30

## 2025-05-30 ASSESSMENT — PULMONARY FUNCTION TESTS
FEV1_PERCENT_PREDICTED_PRE: 85
FEV1/FVC_PRE: 65

## 2025-06-02 ENCOUNTER — CARE COORDINATION (OUTPATIENT)
Dept: CARE COORDINATION | Age: 50
End: 2025-06-02

## 2025-06-04 ENCOUNTER — OFFICE VISIT (OUTPATIENT)
Dept: FAMILY MEDICINE CLINIC | Age: 50
End: 2025-06-04

## 2025-06-04 ENCOUNTER — CARE COORDINATION (OUTPATIENT)
Dept: CARE COORDINATION | Age: 50
End: 2025-06-04

## 2025-06-04 VITALS
HEART RATE: 90 BPM | DIASTOLIC BLOOD PRESSURE: 82 MMHG | OXYGEN SATURATION: 98 % | BODY MASS INDEX: 31.99 KG/M2 | SYSTOLIC BLOOD PRESSURE: 130 MMHG | HEIGHT: 69 IN | WEIGHT: 216 LBS

## 2025-06-04 DIAGNOSIS — R07.9 CHEST PAIN, UNSPECIFIED TYPE: Primary | ICD-10-CM

## 2025-06-04 DIAGNOSIS — Z09 HOSPITAL DISCHARGE FOLLOW-UP: ICD-10-CM

## 2025-06-04 DIAGNOSIS — R79.82 ELEVATED C-REACTIVE PROTEIN (CRP): ICD-10-CM

## 2025-06-04 DIAGNOSIS — H69.93 EUSTACHIAN TUBE DISORDER, BILATERAL: ICD-10-CM

## 2025-06-04 DIAGNOSIS — R70.0 ELEVATED SED RATE: ICD-10-CM

## 2025-06-04 DIAGNOSIS — E87.6 HYPOKALEMIA: ICD-10-CM

## 2025-06-04 DIAGNOSIS — M25.50 ARTHRALGIA OF MULTIPLE JOINTS: ICD-10-CM

## 2025-06-04 RX ORDER — POTASSIUM CHLORIDE 750 MG/1
10 TABLET, EXTENDED RELEASE ORAL DAILY
Qty: 90 TABLET | Refills: 1 | Status: SHIPPED | OUTPATIENT
Start: 2025-06-04

## 2025-06-04 NOTE — CARE COORDINATION
Care Transitions Note    Follow Up Call     Patient Current Location:  Home: [address] [place] [id number]    Care Transition Nurse contacted the patient by telephone. Verified name and  as identifiers.    Additional needs identified to be addressed with provider   No needs identified                 Method of communication with provider: none.    Care Summary Note: full interview not completed as pt stated she was getting ready for her appt with PCP this afternoon. Does not report any concerns at present. Will let her follow up with PCP and return call to check in and answer any questions if possible.     Follow Up Appointment:   PAUL appointment attended as scheduled   Future Appointments         Provider Specialty Dept Phone    2025 4:20 PM Kamran Saleh APRN - CNP Family Medicine [phone]    2025 1:00 PM (Arrive by 12:45 PM) F ECHO 1 Cardiology [phone]    2025 7:30 AM Quintin Ewing MD Cardiology [phone]    2025 2:00 PM Javi Davis MD Pulmonology [phone]            Care Transition Nurse provided contact information.  Plan for follow-up call in 2-5 days based on severity of symptoms and risk factors.  Plan for next call: symptom management-HFU - , CP      DEVORA Davalos, RN   Care Transition Nurse  Mobile: [phone]

## 2025-06-04 NOTE — PROGRESS NOTES
ONCE DAILY IN THE MORNING BEFORE BREAKFAST 90 capsule 2    valsartan (DIOVAN) 320 MG tablet Take 1 tablet by mouth daily 90 tablet 2    acetaminophen (TYLENOL) 325 MG tablet Take 2 tablets by mouth every 6 hours as needed for Pain 120 tablet 2    brexpiprazole (REXULTI) 0.5 MG TABS tablet Take 1 tablet by mouth in the morning and at bedtime          Medications patient taking as of now reconciled against medications ordered at time of hospital discharge: Yes    A comprehensive review of systems was negative except for what was noted in the HPI.    Objective:    /82 (BP Site: Left Upper Arm, Patient Position: Sitting, BP Cuff Size: Medium Adult)   Pulse 90   Ht 1.753 m (5' 9\")   Wt 98 kg (216 lb)   LMP 04/11/2015 (Exact Date)   SpO2 98%   BMI 31.90 kg/m²   General Appearance: alert and oriented to person, place and time, well developed and well- nourished, in no acute distress  Skin: warm and dry, no rash or erythema  Head: normocephalic and atraumatic  Eyes: pupils equal, round, and reactive to light, extraocular eye movements intact, conjunctivae normal  ENT: Bilateral middle ear effusion.  Tympanic membrane, external ear and ear canal normal bilaterally, nose without deformity, nasal mucosa and turbinates normal without polyps  Neck: supple and non-tender without mass, no thyromegaly or thyroid nodules, no cervical lymphadenopathy  Pulmonary/Chest: clear to auscultation bilaterally- no wheezes, rales or rhonchi, normal air movement, no respiratory distress  Cardiovascular: normal rate, regular rhythm, normal S1 and S2, no murmurs, rubs, clicks, or gallops, distal pulses intact, no carotid bruits  Abdomen: soft, non-tender, non-distended, normal bowel sounds, no masses or organomegaly  Extremities: no cyanosis, clubbing or edema  Musculoskeletal: normal range of motion, no joint swelling, deformity or tenderness  Neurologic: reflexes normal and symmetric, no cranial nerve deficit, gait, coordination and

## 2025-06-05 ENCOUNTER — HOSPITAL ENCOUNTER (OUTPATIENT)
Age: 50
Discharge: HOME | End: 2025-06-07
Attending: INTERNAL MEDICINE
Payer: MEDICARE

## 2025-06-05 VITALS
HEIGHT: 69 IN | DIASTOLIC BLOOD PRESSURE: 83 MMHG | BODY MASS INDEX: 31.99 KG/M2 | WEIGHT: 216 LBS | SYSTOLIC BLOOD PRESSURE: 164 MMHG

## 2025-06-05 DIAGNOSIS — Z87.74 S/P PATENT FORAMEN OVALE CLOSURE: ICD-10-CM

## 2025-06-05 DIAGNOSIS — Q21.12 PFO (PATENT FORAMEN OVALE): ICD-10-CM

## 2025-06-05 PROCEDURE — 93306 TTE W/DOPPLER COMPLETE: CPT

## 2025-06-06 ENCOUNTER — CARE COORDINATION (OUTPATIENT)
Dept: CARE COORDINATION | Age: 50
End: 2025-06-06

## 2025-06-06 LAB
ECHO AO ASC DIAM: 3.8 CM
ECHO AO ASCENDING AORTA INDEX: 1.78 CM/M2
ECHO AO ROOT DIAM: 3.2 CM
ECHO AO ROOT INDEX: 1.5 CM/M2
ECHO AR MAX VEL PISA: 5.2 M/S
ECHO AV AREA PEAK VELOCITY: 2.1 CM2
ECHO AV AREA VTI: 2.2 CM2
ECHO AV AREA/BSA PEAK VELOCITY: 1 CM2/M2
ECHO AV AREA/BSA VTI: 1 CM2/M2
ECHO AV MEAN GRADIENT: 6 MMHG
ECHO AV MEAN VELOCITY: 1.1 M/S
ECHO AV PEAK GRADIENT: 11 MMHG
ECHO AV PEAK VELOCITY: 1.7 M/S
ECHO AV REGURGITANT PHT: 422 MS
ECHO AV VELOCITY RATIO: 0.65
ECHO AV VTI: 36.5 CM
ECHO BSA: 2.18 M2
ECHO EST RA PRESSURE: 3 MMHG
ECHO LA AREA 2C: 20.7 CM2
ECHO LA AREA 4C: 15.3 CM2
ECHO LA MAJOR AXIS: 4.9 CM
ECHO LA MINOR AXIS: 5.8 CM
ECHO LA VOL BP: 53 ML (ref 22–52)
ECHO LA VOL MOD A2C: 60 ML (ref 22–52)
ECHO LA VOL MOD A4C: 39 ML (ref 22–52)
ECHO LA VOL/BSA BIPLANE: 25 ML/M2 (ref 16–34)
ECHO LA VOLUME INDEX MOD A2C: 28 ML/M2 (ref 16–34)
ECHO LA VOLUME INDEX MOD A4C: 18 ML/M2 (ref 16–34)
ECHO LV E' LATERAL VELOCITY: 5.33 CM/S
ECHO LV E' SEPTAL VELOCITY: 6.74 CM/S
ECHO LV EDV A2C: 102 ML
ECHO LV EDV A4C: 81 ML
ECHO LV EDV INDEX A4C: 38 ML/M2
ECHO LV EDV NDEX A2C: 48 ML/M2
ECHO LV EF PHYSICIAN: 58 %
ECHO LV EJECTION FRACTION A2C: 63 %
ECHO LV EJECTION FRACTION A4C: 60 %
ECHO LV EJECTION FRACTION BIPLANE: 63 % (ref 55–100)
ECHO LV ESV A2C: 38 ML
ECHO LV ESV A4C: 33 ML
ECHO LV ESV INDEX A2C: 18 ML/M2
ECHO LV ESV INDEX A4C: 15 ML/M2
ECHO LV FRACTIONAL SHORTENING: 29 % (ref 28–44)
ECHO LV GLOBAL LONGITUDINAL STRAIN (GLS): -16.4 %
ECHO LV INTERNAL DIMENSION DIASTOLE INDEX: 2.3 CM/M2
ECHO LV INTERNAL DIMENSION DIASTOLIC: 4.9 CM (ref 3.9–5.3)
ECHO LV INTERNAL DIMENSION SYSTOLIC INDEX: 1.64 CM/M2
ECHO LV INTERNAL DIMENSION SYSTOLIC: 3.5 CM
ECHO LV IVSD: 1 CM (ref 0.6–0.9)
ECHO LV MASS 2D: 176 G (ref 67–162)
ECHO LV MASS INDEX 2D: 82.6 G/M2 (ref 43–95)
ECHO LV POSTERIOR WALL DIASTOLIC: 1 CM (ref 0.6–0.9)
ECHO LV RELATIVE WALL THICKNESS RATIO: 0.41
ECHO LVOT AREA: 3.1 CM2
ECHO LVOT AV VTI INDEX: 0.7
ECHO LVOT DIAM: 2 CM
ECHO LVOT MEAN GRADIENT: 3 MMHG
ECHO LVOT PEAK GRADIENT: 5 MMHG
ECHO LVOT PEAK VELOCITY: 1.1 M/S
ECHO LVOT STROKE VOLUME INDEX: 37.4 ML/M2
ECHO LVOT SV: 79.8 ML
ECHO LVOT VTI: 25.4 CM
ECHO PV MAX VELOCITY: 0.9 M/S
ECHO PV PEAK GRADIENT: 3 MMHG
ECHO RA AREA 4C: 12.3 CM2
ECHO RA END SYSTOLIC VOLUME APICAL 4 CHAMBER INDEX BSA: 13 ML/M2
ECHO RA VOLUME: 28 ML
ECHO RV BASAL DIMENSION: 3.4 CM
ECHO RV FREE WALL PEAK S': 16.3 CM/S
ECHO RV MID DIMENSION: 2.6 CM
ECHO RV TAPSE: 2.3 CM (ref 1.7–?)

## 2025-06-06 NOTE — CARE COORDINATION
Care Transitions Note    Follow Up Call     Patient Current Location:  Home: [address] [place] [address] [address]  [facility] [id number]    Care Transition Nurse contacted the patient by telephone. Verified name and  as identifiers.    Additional needs identified to be addressed with provider   No needs identified                 Method of communication with provider: none.    Care Summary Note: Spoke with pt who states she is doing ok. States she spoke to PCP about her pain - will see ENT for ear pain and has also been referred to a rheumatologist. States she missed their call and will be calling back. Pt discussed fears and stressors. Denies any other concerns at present. We spoke about RPM and BP - pt would like to monitor on her own and follow up with Cardiology  with a log. Denies any other questions at present.     Plan of care updates since last contact:  Education: BP, referrals       Advance Care Planning:   Does patient have an Advance Directive:  pt to fill out with  .    Medication Review:  Medications changed since last call, reviewed today.     Remote Patient Monitoring:  Offered patient enrollment in the Remote Patient Monitoring (RPM) program for in-home monitoring: Yes, but did not enroll at this time: declined to enroll in the program becauseshe feels she has too many stressors at this time. Would like ot monitor on her own.  .    Assessments:  Care Transitions Subsequent and Final Call    Subsequent and Final Calls  Do you have any ongoing symptoms?: Yes  Patient-reported symptoms: Other  Have your medications changed?: Yes  Patient Reports: potassium - pt to  today   Do you have any questions related to your medications?: No  Do you currently have any active services?: No  Do you have any needs or concerns that I can assist you with?: No  Identified Barriers: Stress, Fear of Failure  Care Transitions Interventions  Other Interventions:              Follow Up Appointment:   PAUL appointment

## 2025-06-07 ENCOUNTER — RESULTS FOLLOW-UP (OUTPATIENT)
Dept: CARDIOLOGY CLINIC | Age: 50
End: 2025-06-07

## 2025-06-10 ENCOUNTER — CARE COORDINATION (OUTPATIENT)
Dept: CARE COORDINATION | Age: 50
End: 2025-06-10

## 2025-06-10 NOTE — CARE COORDINATION
Patient Current Location: Home: [address] [place] [id number]      Phone call to Pt to follow up regarding ACP. Pt denied receiving ACP documents.     SW plan of care: SW will make next outreach on 6/17 to confirm she has received ACP documents.

## 2025-06-12 ENCOUNTER — CARE COORDINATION (OUTPATIENT)
Dept: CARE COORDINATION | Age: 50
End: 2025-06-12

## 2025-06-12 NOTE — CARE COORDINATION
6/18/2025 2:00 PM Javi Davis MD Pulmonology [phone]    7/11/2025 11:00 AM Caroline Rodriguez AuD Audiology [phone]    7/11/2025 11:30 AM Anthony Ambriz MD Otolaryngology [phone]            Care Transition Nurse provided contact information.  Plan for follow-up call in 6-10 days based on severity of symptoms and risk factors.  Plan for next call: symptom management-.      DEVORA Davalos, RN   Care Transition Nurse  Mobile: [phone]

## 2025-06-13 ENCOUNTER — OFFICE VISIT (OUTPATIENT)
Dept: CARDIOLOGY CLINIC | Age: 50
End: 2025-06-13
Payer: MEDICARE

## 2025-06-13 ENCOUNTER — ANCILLARY PROCEDURE (OUTPATIENT)
Dept: CARDIOLOGY CLINIC | Age: 50
End: 2025-06-13

## 2025-06-13 VITALS
BODY MASS INDEX: 32.44 KG/M2 | WEIGHT: 219 LBS | HEIGHT: 69 IN | HEART RATE: 69 BPM | SYSTOLIC BLOOD PRESSURE: 138 MMHG | OXYGEN SATURATION: 97 % | DIASTOLIC BLOOD PRESSURE: 80 MMHG

## 2025-06-13 DIAGNOSIS — Z87.74 S/P DEVICE CLOSURE OF ATRIAL SEPTAL DEFECT: ICD-10-CM

## 2025-06-13 DIAGNOSIS — R00.2 PALPITATIONS: ICD-10-CM

## 2025-06-13 DIAGNOSIS — E78.00 PURE HYPERCHOLESTEROLEMIA: ICD-10-CM

## 2025-06-13 DIAGNOSIS — Z86.73 H/O ISCHEMIC LEFT PCA STROKE: ICD-10-CM

## 2025-06-13 DIAGNOSIS — R42 DIZZINESS: ICD-10-CM

## 2025-06-13 DIAGNOSIS — I47.29 RVOT VENTRICULAR TACHYCARDIA (HCC): ICD-10-CM

## 2025-06-13 DIAGNOSIS — I25.10 CAD IN NATIVE ARTERY: Primary | ICD-10-CM

## 2025-06-13 DIAGNOSIS — I10 ESSENTIAL HYPERTENSION: ICD-10-CM

## 2025-06-13 DIAGNOSIS — Z72.0 TOBACCO ABUSE: ICD-10-CM

## 2025-06-13 LAB — ECHO BSA: 2.2 M2

## 2025-06-13 PROCEDURE — G2211 COMPLEX E/M VISIT ADD ON: HCPCS | Performed by: INTERNAL MEDICINE

## 2025-06-13 PROCEDURE — 3075F SYST BP GE 130 - 139MM HG: CPT | Performed by: INTERNAL MEDICINE

## 2025-06-13 PROCEDURE — 3079F DIAST BP 80-89 MM HG: CPT | Performed by: INTERNAL MEDICINE

## 2025-06-13 PROCEDURE — 99214 OFFICE O/P EST MOD 30 MIN: CPT | Performed by: INTERNAL MEDICINE

## 2025-06-13 RX ORDER — CARVEDILOL 25 MG/1
12.5 TABLET ORAL 2 TIMES DAILY
Qty: 90 TABLET | Refills: 3 | Status: SHIPPED | OUTPATIENT
Start: 2025-06-13

## 2025-06-13 NOTE — PATIENT INSTRUCTIONS
Follow up with Dr Ewing in 2 months     Change Carvedilol to 12.5mg twice daily     30 day heart monitor     Call for any questions or concerns.

## 2025-06-13 NOTE — NURSING NOTE
30 day monitor placed on the patient here in clinic today. Reviewed proper care and instructions with the patient.     SN : [id number]

## 2025-06-17 ENCOUNTER — CARE COORDINATION (OUTPATIENT)
Dept: CARE COORDINATION | Age: 50
End: 2025-06-17

## 2025-06-17 NOTE — CARE COORDINATION
Patient Current Location: Home: [address] [place] [address] [address]  [facility] [address]     Phone call to Pt who confirmed she received ACP documents yesterday in the mail. Pt denied having a chance to review documents, stating it seems like a lot to complete. SW did notify Pt that a lot of the documents is information and the part she needs to fill out is not as much. SW offered support with completion. Pt denied having email to complete via "[employer] and refused SW support, stating she will ask family to help her. SW did advise Pt that the documents would need signed by 2 witnesses or notarized and encouraged her to provide a completed copy to [facility] provider to upload into her medical chart.     JASMIN plan of care: SW will resolve from SW services. Pt will call this SW with any questions.

## 2025-06-18 ENCOUNTER — CARE COORDINATION (OUTPATIENT)
Dept: CARE COORDINATION | Age: 50
End: 2025-06-18

## 2025-06-18 ENCOUNTER — OFFICE VISIT (OUTPATIENT)
Dept: PULMONOLOGY | Age: 50
End: 2025-06-18
Payer: MEDICARE

## 2025-06-18 VITALS
SYSTOLIC BLOOD PRESSURE: 140 MMHG | OXYGEN SATURATION: 95 % | HEART RATE: 77 BPM | BODY MASS INDEX: 31.55 KG/M2 | WEIGHT: 213 LBS | HEIGHT: 69 IN | DIASTOLIC BLOOD PRESSURE: 80 MMHG

## 2025-06-18 DIAGNOSIS — F17.210 CIGARETTE NICOTINE DEPENDENCE WITHOUT COMPLICATION: ICD-10-CM

## 2025-06-18 DIAGNOSIS — I25.119 CORONARY ARTERY DISEASE INVOLVING NATIVE CORONARY ARTERY OF NATIVE HEART WITH ANGINA PECTORIS: ICD-10-CM

## 2025-06-18 DIAGNOSIS — J43.2 CENTRILOBULAR EMPHYSEMA (HCC): Primary | ICD-10-CM

## 2025-06-18 PROCEDURE — 99204 OFFICE O/P NEW MOD 45 MIN: CPT | Performed by: INTERNAL MEDICINE

## 2025-06-18 PROCEDURE — 3079F DIAST BP 80-89 MM HG: CPT | Performed by: INTERNAL MEDICINE

## 2025-06-18 PROCEDURE — 3077F SYST BP >= 140 MM HG: CPT | Performed by: INTERNAL MEDICINE

## 2025-06-18 PROCEDURE — 99407 BEHAV CHNG SMOKING > 10 MIN: CPT | Performed by: INTERNAL MEDICINE

## 2025-06-18 RX ORDER — UMECLIDINIUM BROMIDE AND VILANTEROL TRIFENATATE 62.5; 25 UG/1; UG/1
1 POWDER RESPIRATORY (INHALATION) DAILY
Qty: 60 EACH | Refills: 3 | Status: SHIPPED | OUTPATIENT
Start: 2025-06-18

## 2025-06-18 NOTE — CARE COORDINATION
Care Transitions Note    Follow Up Call     Patient Current Location:  Home: [address] [place] [address] [address]  [facility] [address]    Care Transition Nurse contacted the patient by telephone. Verified name and  as identifiers.    Additional needs identified to be addressed with provider   No needs identified                 Method of communication with provider: none.    Care Summary Note: Spoke with Brittany - she followed up with Cardiology. We discussed Coreg cahnge and pt wearing heart monitor. This will be for 30 days. States the sticky's are itchy/stingy but no redness or rash. We discussed calling cardiology office to see if they have any suggestions but spoke about reaction to adhesive being a possibility. Pt to see ENT , will make Rheumatology appt when able and will see Cards again .  No other questions or concerns.     Plan of care updates since last contact:  Education: .       Advance Care Planning:   Does patient have an Advance Directive: pt received paperwork from [facility] - christoph help at this time..    Medication Review:  Medications changed since last call, reviewed today.     Remote Patient Monitoring:  Offered patient enrollment in the Remote Patient Monitoring (RPM) program for in-home monitoring: Deferred at this time because .; will discuss at next outreach.    Assessments:  Care Transitions Subsequent and Final Call    Subsequent and Final Calls  Do you have any ongoing symptoms?: No  Have your medications changed?: Yes  Patient Reports: Coreg 12.5 BID  Do you have any questions related to your medications?: No  Do you currently have any active services?: No  Do you have any needs or concerns that I can assist you with?: No  Identified Barriers: None  Care Transitions Interventions  Other Interventions:              Follow Up Appointment:   Reviewed upcoming appointment(s).  Future Appointments         Provider Specialty Dept Phone    2025 2:00 PM Javi Davis MD Pulmonology [phone]    2025

## 2025-06-18 NOTE — PROGRESS NOTES
PULMONARY OFFICE NEW PATIENT VISIT    CONSULTING PHYSICIAN:  Kamran Saleh APRN - CNP, Kay, William Aaron, MD     REASON FOR VISIT:   Chief Complaint   Patient presents with    New Patient     Ref by Dr Ewing     Shortness of Breath     PFT done 05/30/25    Chest Pain       DATE OF VISIT: 6/18/2025    HISTORY OF PRESENT ILLNESS: 49 y.o. year old female comes into the pulmonary office for the first time.  For the last 6 months she has been having progressive shortness of breath with exertion.  Now it is happening even at rest.  Associate with wheezing and cough.  Cough is productive of brownish expectoration.  No blood in the mucus.  Also has occasional chest pain.  Does have extensive cardiac history and follows with the cardiologist.  Currently smoking 2 packs of cigarettes daily.  Has been smoking for last 15 years.  Does have extreme anxiety as well as depression.  Motivated to quit.  Weight remains stable.  Has been given albuterol inhaler which she uses occasionally.  Whenever she uses it she does feel benefit.  She reports that she cannot take any steroid medication as it makes her very anxious.      REVIEW OF SYSTEMS:   CONSTITUTIONAL SYMPTOMS: The patient denies fever, fatigue, night sweats, weight loss or weight gain.   HEENT: No vision changes. No tinnitus, Denies sinus pain. No hoarseness, or dysphagia.   NECK: Patient denies swelling in the neck.   CARDIOVASCULAR: Denies chest pain, palpitation, syncope.  RESPIRATORY: As per HPI.  GASTROINTESTINAL: Denies nausea, abdominal pain or change in bowel function.  GENITOURINARY: Denies obstructive symptoms. No history of incontinence.  BREASTS: No masses or lumps in the breasts.   SKIN: No rashes or itching.   MUSCULOSKELETAL: Denies weakness or bone pain.   NEUROLOGICAL: No headaches or seizures.   PSYCHIATRIC: Denies mood swings or depression.   ENDOCRINE: Denies heat or cold intolerance or excessive thirst.  HEMATOLOGIC/LYMPHATIC: Denies easy

## 2025-06-26 ENCOUNTER — CARE COORDINATION (OUTPATIENT)
Dept: CASE MANAGEMENT | Age: 50
End: 2025-06-26

## 2025-06-26 NOTE — CARE COORDINATION
Care Transitions Note    Final Call   Patient: rBittany Mccall                                 Patient : 1975   MRN: [id number]                             Reason for Admission: CP  Discharge Date: 25       RURS: Readmission Risk Score: 8.2     Patient Current Location:  Home: [address] [place] [address] Care Coordinator contacted the patient by telephone. Verified name and  as identifiers.    Patient graduated from the Care Transitions program on 2025.  Patient/family has the ability to self-manage at this time..      Advance Care Planning:   Does patient have an Advance Directive: working on filling out paperwork.    Handoff:   Patient/family agreeable to [facility] outreach.      Care Summary Note:   Spoke with Brittany. She stated she is feeling okay. Denies cp, lh, ha, sob, cough or pain. She has dizziness and palpitations at times. She continues to wear a heart monitor. She has it on until . Good appetite and fluid intake. No urinary or bowel problems. Taking medication as prescribed. No questions or needs voiced. Patient informed this is the final CTN call. Any medical issues contact PCP. Patient agreeable to AC calls.    Assessments:  Care Transitions Subsequent and Final Call    Subsequent and Final Calls  Care Transitions Interventions  Other Interventions:              Upcoming Appointments:    Future Appointments         Provider Specialty Dept Phone    2025 11:00 AM Caroline Rodriguez AuD Audiology [phone]    2025 11:30 AM Anthony Ambriz MD Otolaryngology [phone]    2025 8:30 AM Quintin Ewing MD Cardiology [phone]    2025 1:45 PM Javi Davis MD Pulmonology [phone]            Patient has agreed to contact primary care provider and/or specialist for any further questions, concerns, or needs.    Carlene De La Cruz LPN

## 2025-06-30 ENCOUNTER — CARE COORDINATION (OUTPATIENT)
Dept: CARE COORDINATION | Age: 50
End: 2025-06-30

## 2025-06-30 ENCOUNTER — TELEPHONE (OUTPATIENT)
Dept: CARDIOLOGY CLINIC | Age: 50
End: 2025-06-30

## 2025-06-30 NOTE — TELEPHONE ENCOUNTER
Pt calling to let us know that she could no longer wear the monitor due to burning an skin irritation. She took it off night before last. She will send it  back.

## 2025-06-30 NOTE — CARE COORDINATION
Ambulatory Care Coordination Note     2025 12:24 PM     Patient Current Location:  [place]     This patient was received as a referral from Care Transition Nurse.    ACM contacted the patient by telephone. Verified name and  with patient as identifiers. Provided introduction to self, and explanation of the ACM role.   Patient accepted care management services at this time.          ACM: Daxa Mcmillan RN     Challenges to be reviewed by the provider   Additional needs identified to be addressed with provider No                 Method of communication with provider: chart routing.    Utilization: Initial Call - N/A    Care Summary Note:     ACM made care coordination outreach and spoke with patient related to CTN handoff. Patient informed of the role of care coordination and ACM and agreed to future follow up calls. Patient reported that she is doing \"good.\" Stated that she is out of her home and has a limited time to talk. Reported that she took off cardiac monitor due to itching and plans on returning it. Scheduled for AWV with PCP 25. Patient stated that she will call Rheumatologist and schedule follow up appointment. Denied any chest pain, shortness of breath or swelling. Patient denied any other questions or concerns at this time. Discussed with patient signs and symptoms to monitor and importance of reporting concerns to appropriate site of care for early intervention. Patient has VU. Patient is aware of provider on call services for non emergent after hours questions and to report to ED with RED flags concerns. Provided with ACM phone number for any non-emergent questions. Patient agreeable to future care coordination outreaches. ACM to follow up at a later date.       Offered patient enrollment in the Remote Patient Monitoring (RPM) program for in-home monitoring: Yes, but did not enroll at this time: will discuss during next outreach.     Assessments Completed:   General Assessment    Do you have any

## 2025-07-14 ENCOUNTER — RESULTS FOLLOW-UP (OUTPATIENT)
Dept: CARDIOLOGY | Age: 50
End: 2025-07-14

## 2025-07-14 LAB — ECHO BSA: 2.17 M2

## 2025-07-14 NOTE — TELEPHONE ENCOUNTER
----- Message from Dr. Quintin Ewing MD sent at 7/14/2025  3:05 PM EDT -----  Moinitor overall stable  Continue beta blocker

## 2025-07-16 ENCOUNTER — CARE COORDINATION (OUTPATIENT)
Dept: CARE COORDINATION | Age: 50
End: 2025-07-16

## 2025-07-16 SDOH — ECONOMIC STABILITY: FOOD INSECURITY: WITHIN THE PAST 12 MONTHS, YOU WORRIED THAT YOUR FOOD WOULD RUN OUT BEFORE YOU GOT MONEY TO BUY MORE.: NEVER TRUE

## 2025-07-16 SDOH — HEALTH STABILITY: PHYSICAL HEALTH: ON AVERAGE, HOW MANY MINUTES DO YOU ENGAGE IN EXERCISE AT THIS LEVEL?: 0 MIN

## 2025-07-16 SDOH — HEALTH STABILITY: PHYSICAL HEALTH: ON AVERAGE, HOW MANY DAYS PER WEEK DO YOU ENGAGE IN MODERATE TO STRENUOUS EXERCISE (LIKE A BRISK WALK)?: 0 DAYS

## 2025-07-16 SDOH — ECONOMIC STABILITY: INCOME INSECURITY: IN THE LAST 12 MONTHS, WAS THERE A TIME WHEN YOU WERE NOT ABLE TO PAY THE MORTGAGE OR RENT ON TIME?: NO

## 2025-07-16 SDOH — ECONOMIC STABILITY: FOOD INSECURITY: WITHIN THE PAST 12 MONTHS, THE FOOD YOU BOUGHT JUST DIDN'T LAST AND YOU DIDN'T HAVE MONEY TO GET MORE.: NEVER TRUE

## 2025-07-16 ASSESSMENT — SOCIAL DETERMINANTS OF HEALTH (SDOH): HOW HARD IS IT FOR YOU TO PAY FOR THE VERY BASICS LIKE FOOD, HOUSING, MEDICAL CARE, AND HEATING?: NOT HARD AT ALL

## 2025-07-16 NOTE — CARE COORDINATION
Ambulatory Care Coordination Note     2025 2:25 PM     Patient Current Location:  Home: [address] [place] [id number]     ACM contacted the patient by telephone. Verified name and  with patient as identifiers.         ACM: Daxa Mcmillan RN     Challenges to be reviewed by the provider   Additional needs identified to be addressed with provider No                 Method of communication with provider: none.    Utilization: Patient has not had any utilization since our last call.     Care Summary Note:     ACM made care coordination outreach and spoke with patient. Patient reported that she is doing \"good.\" Denied any chest pain, shortness of breath or swelling. Declined medication review at this time. Reported no recent med changes. Does not have any concerns or questions related to medications. Acp reviewed. Patient reported that she has documents and plans to complete with a friend. Politely declined  outreach regarding ACP. Aware that  can assist with completion if needed. Discussed making copies and bringing to office to be scanned into electronic medical records.   Independent in care. Steady on feet with ambulation. Does not use any devices. No falls in the past year. Lives in a private residence with roommate. Denied any financial strain. Active . Denied any concerns with HTN. Monitors as needed and is normal range. Reviewed upcoming appointments. Patient expressed that she does not have access to [employer] at this time. Provided with [employer] service phone number.   Patient denied any other questions or concerns at this time. Discussed with patient signs and symptoms to monitor and importance of reporting concerns to appropriate site of care for early intervention. Patient has VU. Patient is aware of provider on call services for non emergent after hours questions and to report to ED with RED flags concerns. Patient agreeable to future care coordination outreaches. ACM to

## 2025-07-19 DIAGNOSIS — J30.2 SEASONAL ALLERGIC RHINITIS, UNSPECIFIED TRIGGER: ICD-10-CM

## 2025-07-21 RX ORDER — CETIRIZINE HYDROCHLORIDE 10 MG/1
10 TABLET, FILM COATED ORAL DAILY
Qty: 90 TABLET | Refills: 0 | Status: SHIPPED | OUTPATIENT
Start: 2025-07-21

## 2025-08-01 ENCOUNTER — PROCEDURE VISIT (OUTPATIENT)
Dept: AUDIOLOGY | Age: 50
End: 2025-08-01
Payer: MEDICARE

## 2025-08-01 ENCOUNTER — OFFICE VISIT (OUTPATIENT)
Dept: ENT CLINIC | Age: 50
End: 2025-08-01
Payer: MEDICARE

## 2025-08-01 VITALS
DIASTOLIC BLOOD PRESSURE: 94 MMHG | TEMPERATURE: 98.2 F | WEIGHT: 216 LBS | OXYGEN SATURATION: 97 % | HEIGHT: 69 IN | BODY MASS INDEX: 31.99 KG/M2 | SYSTOLIC BLOOD PRESSURE: 145 MMHG | HEART RATE: 81 BPM

## 2025-08-01 DIAGNOSIS — G43.809 VARIANTS OF MIGRAINE: ICD-10-CM

## 2025-08-01 DIAGNOSIS — R42 DIZZINESS AND GIDDINESS: ICD-10-CM

## 2025-08-01 DIAGNOSIS — D68.51 FACTOR V LEIDEN: ICD-10-CM

## 2025-08-01 DIAGNOSIS — M54.2 CERVICALGIA: ICD-10-CM

## 2025-08-01 DIAGNOSIS — H92.03 OTALGIA OF BOTH EARS: ICD-10-CM

## 2025-08-01 DIAGNOSIS — H90.3 SENSORINEURAL HEARING LOSS (SNHL) OF BOTH EARS: Primary | ICD-10-CM

## 2025-08-01 DIAGNOSIS — H92.01 RIGHT EAR PAIN: Primary | ICD-10-CM

## 2025-08-01 DIAGNOSIS — R07.9 CHEST PAIN, UNSPECIFIED TYPE: ICD-10-CM

## 2025-08-01 PROBLEM — R20.2 PARESTHESIA OF LOWER EXTREMITY: Status: ACTIVE | Noted: 2019-02-25

## 2025-08-01 PROBLEM — I16.0 HYPERTENSIVE URGENCY: Status: ACTIVE | Noted: 2019-02-25

## 2025-08-01 PROBLEM — J18.9 SEPSIS DUE TO PNEUMONIA (HCC): Status: ACTIVE | Noted: 2022-10-26

## 2025-08-01 PROBLEM — J18.9 COMMUNITY ACQUIRED PNEUMONIA OF LEFT LOWER LOBE OF LUNG: Status: ACTIVE | Noted: 2022-10-26

## 2025-08-01 PROBLEM — A41.9 SEPSIS DUE TO PNEUMONIA (HCC): Status: ACTIVE | Noted: 2022-10-26

## 2025-08-01 PROBLEM — F17.200 NICOTINE DEPENDENCE WITH CURRENT USE: Status: ACTIVE | Noted: 2023-11-29

## 2025-08-01 PROBLEM — R94.31 PROLONGED QT INTERVAL: Status: ACTIVE | Noted: 2023-11-29

## 2025-08-01 PROBLEM — F41.1 GENERALIZED ANXIETY DISORDER WITH PANIC ATTACKS: Status: ACTIVE | Noted: 2021-06-01

## 2025-08-01 PROBLEM — F41.0 GENERALIZED ANXIETY DISORDER WITH PANIC ATTACKS: Status: ACTIVE | Noted: 2021-06-01

## 2025-08-01 PROBLEM — I49.1 PAC (PREMATURE ATRIAL CONTRACTION): Status: ACTIVE | Noted: 2023-11-29

## 2025-08-01 PROCEDURE — 99203 OFFICE O/P NEW LOW 30 MIN: CPT | Performed by: OTOLARYNGOLOGY

## 2025-08-01 PROCEDURE — 92567 TYMPANOMETRY: CPT | Performed by: AUDIOLOGIST

## 2025-08-01 PROCEDURE — 3080F DIAST BP >= 90 MM HG: CPT | Performed by: OTOLARYNGOLOGY

## 2025-08-01 PROCEDURE — 92557 COMPREHENSIVE HEARING TEST: CPT | Performed by: AUDIOLOGIST

## 2025-08-01 PROCEDURE — 3077F SYST BP >= 140 MM HG: CPT | Performed by: OTOLARYNGOLOGY

## 2025-08-01 NOTE — PROGRESS NOTES
Brittany Mccall   1975, 49 y.o. female   [id number]       Referring Provider: Anthony Ambriz MD  Referral Type: Referring Provider Encounter Note    Reason for Visit: Evaluation of suspected change in hearing, tinnitus, or balance.    ADULT AUDIOLOGIC EVALUATION      Brittany Mccall is a 49 y.o. female seen today, 8/1/2025, for an initial audiologic evaluation.      AUDIOLOGIC AND OTHER PERTINENT MEDICAL HISTORY:        Brittany Mccall noted ear pressure and pain bilaterally with muffled hearing, has been to her PCP 3 times in past 6 months with ear issues, right ear may be worse; some dizziness; many in family with history of ear issues and/or hearing loss.    She denied otorrhea, tinnitus, history of noise exposure, and history of ear surgery.    IMPRESSIONS:        Today's results revealed bilateral sensorineural hearing loss with normal middle ear function and excellent word recognition for conversational speech bilaterally. Reviewed findings with patient.    Follow medical recommendations of Anthony Ambriz MD.     ASSESSMENT AND FINDINGS:       Otoscopy revealed: Clear ear canals bilaterally    RIGHT EAR:  Hearing Sensitivity: Borderline-normal to mild sensorineural hearing loss.  Speech Recognition Threshold: 20 dBHL  Word Recognition: Excellent (100%), based on NU-6 25-word list at 50 dBHL using recorded speech stimuli.    Tympanometry: Normal peak pressure and compliance, Type A tympanogram, consistent with normal middle ear function.       LEFT EAR:  Hearing Sensitivity: Borderline-normal to mild sensorineural hearing loss.  Speech Recognition Threshold: 20 dBHL  Word Recognition: Excellent (100%), based on NU-6 25-word list at 50 dBHL using recorded speech stimuli.    Tympanometry: Normal peak pressure and compliance, Type A tympanogram, consistent with normal middle ear function.       Reliability: Good   Transducer: Inserts    See scanned audiogram dated 8/1/2025 for results.      PATIENT EDUCATION:

## 2025-08-04 PROBLEM — H92.01 RIGHT EAR PAIN: Status: ACTIVE | Noted: 2025-08-04

## 2025-08-04 PROBLEM — M54.2 CERVICALGIA: Status: ACTIVE | Noted: 2025-08-04

## 2025-08-07 ENCOUNTER — CARE COORDINATION (OUTPATIENT)
Dept: CARE COORDINATION | Age: 50
End: 2025-08-07

## 2025-08-18 ENCOUNTER — PATIENT MESSAGE (OUTPATIENT)
Dept: ENT CLINIC | Age: 50
End: 2025-08-18

## 2025-08-26 ENCOUNTER — TELEPHONE (OUTPATIENT)
Dept: FAMILY MEDICINE CLINIC | Age: 50
End: 2025-08-26

## 2025-09-03 ENCOUNTER — CARE COORDINATION (OUTPATIENT)
Dept: CARE COORDINATION | Age: 50
End: 2025-09-03

## 2025-09-04 ENCOUNTER — HOSPITAL ENCOUNTER (OUTPATIENT)
Age: 50
Discharge: HOME | End: 2025-09-04
Payer: MEDICARE

## 2025-09-04 ENCOUNTER — OFFICE VISIT (OUTPATIENT)
Dept: CARDIOLOGY CLINIC | Age: 50
End: 2025-09-04
Payer: MEDICARE

## 2025-09-04 VITALS
SYSTOLIC BLOOD PRESSURE: 128 MMHG | WEIGHT: 219 LBS | OXYGEN SATURATION: 93 % | HEIGHT: 69 IN | BODY MASS INDEX: 32.44 KG/M2 | HEART RATE: 73 BPM | DIASTOLIC BLOOD PRESSURE: 78 MMHG

## 2025-09-04 DIAGNOSIS — R07.9 CHEST PAIN, UNSPECIFIED TYPE: ICD-10-CM

## 2025-09-04 DIAGNOSIS — I25.118 CORONARY ARTERY DISEASE INVOLVING NATIVE CORONARY ARTERY OF NATIVE HEART WITH OTHER FORM OF ANGINA PECTORIS: Primary | ICD-10-CM

## 2025-09-04 DIAGNOSIS — R06.02 SHORTNESS OF BREATH: ICD-10-CM

## 2025-09-04 LAB
25(OH)D3 SERPL-MCNC: 41.6 NG/ML
ALBUMIN SERPL-MCNC: 4.3 G/DL (ref 3.4–5)
ALBUMIN/GLOB SERPL: 1.3 {RATIO} (ref 1.1–2.2)
ALP SERPL-CCNC: 94 U/L (ref 40–129)
ALT SERPL-CCNC: 12 U/L (ref 10–40)
AMPHETAMINES UR QL SCN>1000 NG/ML: ABNORMAL
ANION GAP SERPL CALCULATED.3IONS-SCNC: 14 MMOL/L (ref 3–16)
AST SERPL-CCNC: 20 U/L (ref 15–37)
BARBITURATES UR QL SCN>200 NG/ML: ABNORMAL
BASOPHILS # BLD: 0.2 K/UL (ref 0–0.2)
BASOPHILS NFR BLD: 2.2 %
BENZODIAZ UR QL SCN>200 NG/ML: ABNORMAL
BILIRUB DIRECT SERPL-MCNC: <0.1 MG/DL (ref 0–0.3)
BILIRUB INDIRECT SERPL-MCNC: 0.2 MG/DL (ref 0–1)
BILIRUB SERPL-MCNC: 0.3 MG/DL (ref 0–1)
BUN SERPL-MCNC: 9 MG/DL (ref 7–20)
CALCIUM SERPL-MCNC: 10.1 MG/DL (ref 8.3–10.6)
CANNABINOIDS UR QL SCN>50 NG/ML: POSITIVE
CHLORIDE SERPL-SCNC: 104 MMOL/L (ref 99–110)
CHOLEST SERPL-MCNC: 141 MG/DL (ref 0–199)
CO2 SERPL-SCNC: 24 MMOL/L (ref 21–32)
COCAINE UR QL SCN: ABNORMAL
CREAT SERPL-MCNC: 1 MG/DL (ref 0.6–1.1)
DEPRECATED RDW RBC AUTO: 14.5 % (ref 12.4–15.4)
DRUG SCREEN COMMENT UR-IMP: ABNORMAL
EOSINOPHIL # BLD: 0.2 K/UL (ref 0–0.6)
EOSINOPHIL NFR BLD: 2.2 %
EST. AVERAGE GLUCOSE BLD GHB EST-MCNC: 105.4 MG/DL
FENTANYL SCREEN, URINE: ABNORMAL
GFR SERPLBLD CREATININE-BSD FMLA CKD-EPI: 68 ML/MIN/{1.73_M2}
GLUCOSE SERPL-MCNC: 93 MG/DL (ref 70–99)
HBA1C MFR BLD: 5.3 %
HCT VFR BLD AUTO: 37.6 % (ref 36–48)
HDLC SERPL-MCNC: 42 MG/DL (ref 40–60)
HGB BLD-MCNC: 13 G/DL (ref 12–16)
LDLC SERPL CALC-MCNC: 55 MG/DL
LYMPHOCYTES # BLD: 3.2 K/UL (ref 1–5.1)
LYMPHOCYTES NFR BLD: 29.4 %
MCH RBC QN AUTO: 31.7 PG (ref 26–34)
MCHC RBC AUTO-ENTMCNC: 34.5 G/DL (ref 31–36)
MCV RBC AUTO: 91.8 FL (ref 80–100)
METHADONE UR QL SCN>300 NG/ML: ABNORMAL
MONOCYTES # BLD: 0.7 K/UL (ref 0–1.3)
MONOCYTES NFR BLD: 6.5 %
NEUTROPHILS # BLD: 6.4 K/UL (ref 1.7–7.7)
NEUTROPHILS NFR BLD: 59.7 %
OPIATES UR QL SCN>300 NG/ML: ABNORMAL
OXYCODONE UR QL SCN: ABNORMAL
PCP UR QL SCN>25 NG/ML: ABNORMAL
PH UR STRIP: 6 [PH]
PLATELET # BLD AUTO: 314 K/UL (ref 135–450)
PMV BLD AUTO: 8.1 FL (ref 5–10.5)
POTASSIUM SERPL-SCNC: 4.2 MMOL/L (ref 3.5–5.1)
PROT SERPL-MCNC: 7.5 G/DL (ref 6.4–8.2)
RBC # BLD AUTO: 4.09 M/UL (ref 4–5.2)
SODIUM SERPL-SCNC: 142 MMOL/L (ref 136–145)
T4 FREE SERPL-MCNC: 1.1 NG/DL (ref 0.9–1.8)
TRIGL SERPL-MCNC: 218 MG/DL (ref 0–150)
TSH SERPL DL<=0.005 MIU/L-ACNC: 2.41 UIU/ML (ref 0.27–4.2)
VLDLC SERPL CALC-MCNC: 44 MG/DL
WBC # BLD AUTO: 10.7 K/UL (ref 4–11)

## 2025-09-04 PROCEDURE — 83036 HEMOGLOBIN GLYCOSYLATED A1C: CPT

## 2025-09-04 PROCEDURE — 84443 ASSAY THYROID STIM HORMONE: CPT

## 2025-09-04 PROCEDURE — 99214 OFFICE O/P EST MOD 30 MIN: CPT | Performed by: INTERNAL MEDICINE

## 2025-09-04 PROCEDURE — 84439 ASSAY OF FREE THYROXINE: CPT

## 2025-09-04 PROCEDURE — 80053 COMPREHEN METABOLIC PANEL: CPT

## 2025-09-04 PROCEDURE — 36415 COLL VENOUS BLD VENIPUNCTURE: CPT

## 2025-09-04 PROCEDURE — 80307 DRUG TEST PRSMV CHEM ANLYZR: CPT

## 2025-09-04 PROCEDURE — 85025 COMPLETE CBC W/AUTO DIFF WBC: CPT

## 2025-09-04 PROCEDURE — 82306 VITAMIN D 25 HYDROXY: CPT

## 2025-09-04 PROCEDURE — 80061 LIPID PANEL: CPT

## 2025-09-04 PROCEDURE — 82248 BILIRUBIN DIRECT: CPT

## 2025-09-04 PROCEDURE — 3074F SYST BP LT 130 MM HG: CPT | Performed by: INTERNAL MEDICINE

## 2025-09-04 PROCEDURE — 3078F DIAST BP <80 MM HG: CPT | Performed by: INTERNAL MEDICINE

## 2025-09-04 RX ORDER — CLOPIDOGREL BISULFATE 75 MG/1
75 TABLET ORAL DAILY
Qty: 90 TABLET | Refills: 3 | Status: SHIPPED | OUTPATIENT
Start: 2025-09-04

## 2025-09-04 RX ORDER — BREXPIPRAZOLE 2 MG/1
2 TABLET ORAL NIGHTLY
COMMUNITY
Start: 2025-08-13

## (undated) DEVICE — ELECTRODE PT RET AD L9FT HI MOIST COND ADH HYDRGEL CORDED

## (undated) DEVICE — TR BAND RADIAL ARTERY COMPRESSION DEVICE: Brand: TR BAND

## (undated) DEVICE — INTRODUCER SHTH DIA8FR CANN L23CM BLU VASC CATH W/O MINI

## (undated) DEVICE — PRESSURE GUIDEWIRE: Brand: COMET™ II

## (undated) DEVICE — GUIDEWIRE VASC L260CM DIA0.035IN RAD 3MM J TIP L7CM PTFE

## (undated) DEVICE — PERCLOSE™ PROSTYLE™ SUTURE-MEDIATED CLOSURE AND REPAIR SYSTEM: Brand: PERCLOSE™ PROSTYLE™

## (undated) DEVICE — DRAPE,ANGIO,BRACH,STERILE,38X44: Brand: MEDLINE

## (undated) DEVICE — CATH LAB PACK: Brand: MEDLINE INDUSTRIES, INC.

## (undated) DEVICE — PAD, DEFIB, ADULT, RADIOTRANS, PHYSIO: Brand: MEDLINE

## (undated) DEVICE — CATHETER 5FR CORDIS PIG 145DEG 110CM

## (undated) DEVICE — Device: Brand: NOMOLINE™ LH ADULT NASAL CO2 CANNULA WITH O2 4M

## (undated) DEVICE — PROBE COVER KIT: Brand: MEDLINE INDUSTRIES, INC.

## (undated) DEVICE — KIT AT-X65 ANGIOTOUCH HAND CONTROLLER

## (undated) DEVICE — CATHETER DIAG L100CM DIA5.2FR 0.038IN POLYUR COR JR4 STD

## (undated) DEVICE — WIRE GUID DIAG PTFE COAT FIX COR 3MM J TIP .035INX80CM

## (undated) DEVICE — CATHETER GUID 6FR L100CM GRN PTFE XBLAD3.5 TRUELUMEN HYBRID

## (undated) DEVICE — GLIDESHEATH SLENDER ACCESS KIT: Brand: GLIDESHEATH SLENDER

## (undated) DEVICE — CATHETER ABLAT 8FR L115CM 1-6-2MM SPC TIP 3.5MM DF CRV

## (undated) DEVICE — CATHETER US 8FR L90CM GRN TIP OVERLAY FOR GE-VIVID I VIVID

## (undated) DEVICE — 3M™ RED DOT™ REPOSITIONABLE MONITORING ELECTRODE 2670-5, 5/BAG, 200/CASE, 54/PLT: Brand: RED DOT™

## (undated) DEVICE — CATHETER 5FR JL3.5 CORDIS 100CM

## (undated) DEVICE — SHEATH INTRO 8.5FR L71CM 8.5FR DIL GWIRE L180CM DIA0.032IN